# Patient Record
Sex: FEMALE | Race: WHITE | NOT HISPANIC OR LATINO | Employment: OTHER | URBAN - METROPOLITAN AREA
[De-identification: names, ages, dates, MRNs, and addresses within clinical notes are randomized per-mention and may not be internally consistent; named-entity substitution may affect disease eponyms.]

---

## 2017-06-05 ENCOUNTER — OFFICE VISIT (OUTPATIENT)
Dept: DERMATOLOGY | Facility: CLINIC | Age: 82
End: 2017-06-05
Payer: MEDICARE

## 2017-06-05 VITALS — OXYGEN SATURATION: 96 % | HEART RATE: 82 BPM | SYSTOLIC BLOOD PRESSURE: 141 MMHG | DIASTOLIC BLOOD PRESSURE: 70 MMHG

## 2017-06-05 DIAGNOSIS — Z85.828 HISTORY OF SKIN CANCER: ICD-10-CM

## 2017-06-05 DIAGNOSIS — L81.4 LENTIGO: Primary | ICD-10-CM

## 2017-06-05 DIAGNOSIS — L82.1 SK (SEBORRHEIC KERATOSIS): ICD-10-CM

## 2017-06-05 DIAGNOSIS — D18.00 ANGIOMA: ICD-10-CM

## 2017-06-05 DIAGNOSIS — Z85.820 HISTORY OF MELANOMA: ICD-10-CM

## 2017-06-05 PROCEDURE — 99213 OFFICE O/P EST LOW 20 MIN: CPT | Performed by: DERMATOLOGY

## 2017-06-05 NOTE — MR AVS SNAPSHOT
"              After Visit Summary   6/5/2017    Gabriela Agrawal    MRN: [id number]           Patient Information     Date Of Birth          8/8/1933        Visit Information        Provider Department      6/5/2017 9:00 AM Wade Padgett MD [facility]        Today's Diagnoses     Lentigo    -  1    History of skin cancer        History of melanoma        Angioma        SK (seborrheic keratosis)           Follow-ups after your visit        Your next 10 appointments already scheduled     Dec 05, 2017 11:45 AM CST   Return Visit with Wade Padgett MD   [facility] ([facility])    [address] [facility] [address]   [phone]              Who to contact     If you have questions or need follow up information about today's clinic visit or your schedule please contact [facility] directly at [phone].  Normal or non-critical lab and imaging results will be communicated to you by MyChart, letter or phone within 4 business days after the clinic has received the results. If you do not hear from us within 7 days, please contact the clinic through MyChart or phone. If you have a critical or abnormal lab result, we will notify you by phone as soon as possible.  Submit refill requests through [facility] or call your pharmacy and they will forward the refill request to us. Please allow 3 business days for your refill to be completed.          Additional Information About Your Visit        MyChart Information     [employer] lets you send messages to your doctor, view your test results, renew your prescriptions, schedule appointments and more. To sign up, go to [email or web address] . Click on \"Log in\" on the left side of the screen, which will take you to the Welcome page. Then click on \"Sign up Now\" on the right side of the page.     You will be asked to enter the access code listed below, as well as some personal information. Please follow " the directions to create your username and password.     Your access code is: [email or web address]  Expires: 9/3/2017  9:55 AM     Your access code will  in 90 days. If you need help or a new code, please call your [facility] clinic or [phone].        Care EveryWhere ID     This is your Care EveryWhere ID. This could be used by other organizations to access your [facility] medical records  [id number]        Your Vitals Were     Pulse Pulse Oximetry                82 96%           Blood Pressure from Last 3 Encounters:   17 141/70   16 146/73   11/15/16 136/76    Weight from Last 3 Encounters:   11/15/16 76.7 kg (169 lb)   12/11/15 73.5 kg (162 lb)   10/06/15 74.5 kg (164 lb 3.2 oz)              Today, you had the following     No orders found for display       Primary Care Provider Office Phone # Fax #    Coco Sampson Roberto,  [phone]       [facility] [address] [place] [address]        Thank you!     Thank you for choosing [facility]  for your care. Our goal is always to provide you with excellent care. Hearing back from our patients is one way we can continue to improve our services. Please take a few minutes to complete the written survey that you may receive in the mail after your visit with us. Thank you!             Your Updated Medication List - Protect others around you: Learn how to safely use, store and throw away your medicines at www.disposemymeds.org.          This list is accurate as of: 17  9:55 AM.  Always use your most recent med list.                   Brand Name Dispense Instructions for use    aspirin 81 MG tablet      Take 1 tablet by mouth daily.       CALCIUM + D PO      Take 1 tablet by mouth daily.       cholecalciferol 1000 UNIT tablet    vitamin D    100 tablet    Take 1 tablet by mouth daily.       fish oil-omega-3 fatty acids 1000 MG capsule     180 capsule    Take 1 g by mouth 2 times daily.       fluorouracil 5 %  cream    EFUDEX    40 g    Apply topically 2 times daily To face for two weeks       losartan 100 MG tablet    COZAAR    90 tablet    Take 1 tablet (100 mg) by mouth daily       lovastatin 20 MG tablet    MEVACOR    90 tablet    Take 1 tablet (20 mg) by mouth At Bedtime       MAG-200 PO      Take 1 tablet by mouth daily.       Multi-vitamin Tabs tablet   Generic drug:  multivitamin, therapeutic with minerals      1 TABLET DAILY       VITAMIN C PO      1 TABLET DAILY

## 2017-06-05 NOTE — PROGRESS NOTES
Gabriela Agrawal is a 83 year old year old female patient here today for f/u hx of non-melanoma skin cancer and melanoma.  Today she notes itching scaly papule[ots on left cheek.   .  Patient states this has been present for a while.  Patient reports the following symptoms:  itching.  Patient reports the following previous treatments none.  Patient reports the following modifying factors none.  Associated symptoms: none.  Patient has no other skin complaints today.  Remainder of the HPI, Meds, PMH, Allergies, FH, and SH was reviewed in chart.    Pertinent Hx:   Melanoma and basal cell carcinoma   Past Medical History:   Diagnosis Date     Basal cell carcinoma      Bronchiolitis obliterans (H)      Diffuse cystic mastopathy     Bresat Fibrocystic Disease     Disorder of bone and cartilage, unspecified      Malignant melanoma (H)      Nonspecific (abnormal) findings on radiological and other examination of lung field      Other abnormal blood chemistry      Other specified alveolar and parietoalveolar pneumonopathies      Squamous cell carcinoma (H)      Syncope and collapse     2007 holter neg       Past Surgical History:   Procedure Laterality Date     APPENDECTOMY OPEN       BREAST SURGERY      biopsy     COLONOSCOPY  2/8/2013    Procedure: COLONOSCOPY;  Colonoscopy  ;  Surgeon: Norah Whaley MD;  Location: [facility]     EYE SURGERY      keratoplasty     HERNIA REPAIR      left inginal     HYSTERECTOMY      including ovaries     KNEE SURGERY      2010, 2009     LUNG SURGERY      biopsy     melanoma  resection          Family History   Problem Relation Age of Onset     CANCER Other      Family History Negative Other        Social History     Social History     Marital status:      Spouse name: N/A     Number of children: N/A     Years of education: N/A     Occupational History      Retired     Social History Main Topics     Smoking status: Former Smoker     Packs/day: 1.50     Years: 22.50      Quit date: 1/1/1982     Smokeless tobacco: Never Used     Alcohol use Yes      Comment: 4-6 a week     Drug use: No     Sexual activity: Not on file     Other Topics Concern     Not on file     Social History Narrative       Outpatient Encounter Prescriptions as of 6/5/2017   Medication Sig Dispense Refill     lovastatin (MEVACOR) 20 MG tablet Take 1 tablet (20 mg) by mouth At Bedtime 90 tablet 3     losartan (COZAAR) 100 MG tablet Take 1 tablet (100 mg) by mouth daily 90 tablet 3     fluorouracil (EFUDEX) 5 % cream Apply topically 2 times daily To face for two weeks 40 g 0     Calcium Carbonate-Vitamin D (CALCIUM + D PO) Take 1 tablet by mouth daily.       Magnesium Oxide, [id number], (MAG-200 PO) Take 1 tablet by mouth daily.       fish oil-omega-3 fatty acids 1000 MG capsule Take 1 g by mouth 2 times daily. 180 capsule 12     cholecalciferol (VITAMIN D) 1000 UNIT tablet Take 1 tablet by mouth daily. 100 tablet 12     aspirin 81 MG tablet Take 1 tablet by mouth daily.  3     MULTI-VITAMIN OR TABS 1 TABLET DAILY       VITAMIN C OR 1 TABLET DAILY       No facility-administered encounter medications on file as of 6/5/2017.              Review Of Systems  Skin: As above  Eyes: negative  Ears/Nose/Throat: negative  Respiratory: No shortness of breath, dyspnea on exertion, cough, or hemoptysis  Cardiovascular: negative  Gastrointestinal: negative  Genitourinary: negative  Musculoskeletal: negative  Neurologic: negative  Psychiatric: negative  Hematologic/Lymphatic/Immunologic: negative  Endocrine: negative      O:   NAD, WDWN, Alert & Oriented, Mood & Affect wnl, Vitals stable   Here today alone   /70  Pulse 82  SpO2 96%   General appearance normal   Vitals stable   Alert, oriented and in no acute distress      Following lymph nodes palpated: Occipital, Cervical, Supraclavicular no lad   Stuck on papules and brown macules on trunk and ext    L cheek stuck on brown scaly papule    Red papules on trunk         The  remainder of the full exam was unremarkable; the following areas were examined:  conjunctiva/lids, oral mucosa, neck, peripheral vascular system, abdomen, lymph nodes, digits/nails, eccrine and apocrine glands, scalp/hair, face, neck, chest, abdomen, buttocks, back, RUE, LUE, RLE, LLE       Eyes: Conjunctivae/lids:Normal     ENT: Lips, buccal mucosa, tongue: normal    MSK:Normal    Cardiovascular: peripheral edema none    Pulm: Breathing Normal    Lymph Nodes: No Head and Neck Lymphadenopathy     Neuro/Psych: Orientation:Normal; Mood/Affect:Normal      A/P:  1. Seborrheic keratosis, lentigo, hx of non-melanoma skin cancer, hx of melanoma, angioma  MELANOMA DISCUSSED WITH PATIENT:  I discussed the specifics of tumor, prognosis, metachronous melanoma, self exam, and genetics with the patient. I explained the need for monthly skin exams including and taught the patient how to do this. Patient was asked about new or changing moles and a full skin exam was performed.   BENIGN LESIONS DISCUSSED WITH PATIENT:  I discussed the specifics of tumor, prognosis, and genetics of benign lesions.  I explained that treatment of these lesions would be purely cosmetic and not medically neccessary.  I discussed with patient different removal options including excision, cautery and /or laser.      Nature and genetics of benign skin lesions dicussed with patient.  Signs and Symptoms of skin cancer discussed with patient.  ABCDEs of melanoma reviewed with patient.  Patient encouraged to perform monthly skin exams.  UV precautions reviewed with patient.  Skin care regimen reviewed with patient: Eliminate harsh soaps, i.e. Dial, zest, irsih spring; Mild soaps such as Cetaphil or Dove sensitive skin, avoid hot or cold showers, aggressive use of emollients including vanicream, cetaphil or cerave discussed with patient.    Risks of non-melanoma skin cancer discussed with patient   Return to clinic [address] [place] [address]

## 2017-06-05 NOTE — NURSING NOTE
Chief Complaint   Patient presents with     Derm Problem     skin check       Vitals:    06/05/17 0904   BP: 141/70   Pulse: 82   SpO2: 96%     Wt Readings from Last 1 Encounters:   11/15/16 76.7 kg (169 lb)       Kristina Mejia LPN.................6/5/2017

## 2017-06-25 ENCOUNTER — HOSPITAL ENCOUNTER (EMERGENCY)
Facility: CLINIC | Age: 82
Discharge: HOME | End: 2017-06-25
Attending: PHYSICIAN ASSISTANT | Admitting: PHYSICIAN ASSISTANT
Payer: MEDICARE

## 2017-06-25 VITALS
SYSTOLIC BLOOD PRESSURE: 164 MMHG | RESPIRATION RATE: 20 BRPM | OXYGEN SATURATION: 94 % | TEMPERATURE: 98.1 F | DIASTOLIC BLOOD PRESSURE: 78 MMHG

## 2017-06-25 DIAGNOSIS — J98.01 ACUTE BRONCHOSPASM: ICD-10-CM

## 2017-06-25 DIAGNOSIS — J20.9 ACUTE BRONCHITIS, UNSPECIFIED ORGANISM: ICD-10-CM

## 2017-06-25 PROCEDURE — 25000125 ZZHC RX 250: Performed by: PHYSICIAN ASSISTANT

## 2017-06-25 PROCEDURE — 94640 AIRWAY INHALATION TREATMENT: CPT

## 2017-06-25 PROCEDURE — 99214 OFFICE O/P EST MOD 30 MIN: CPT | Performed by: PHYSICIAN ASSISTANT

## 2017-06-25 PROCEDURE — 99213 OFFICE O/P EST LOW 20 MIN: CPT | Mod: 25

## 2017-06-25 RX ORDER — IPRATROPIUM BROMIDE AND ALBUTEROL SULFATE 2.5; .5 MG/3ML; MG/3ML
3 SOLUTION RESPIRATORY (INHALATION) ONCE
Status: COMPLETED | OUTPATIENT
Start: 2017-06-25 | End: 2017-06-25

## 2017-06-25 RX ORDER — PREDNISONE 20 MG/1
TABLET ORAL
Qty: 10 TABLET | Refills: 0 | Status: SHIPPED | OUTPATIENT
Start: 2017-06-25 | End: 2017-11-21

## 2017-06-25 RX ADMIN — IPRATROPIUM BROMIDE AND ALBUTEROL SULFATE 3 ML: .5; 3 SOLUTION RESPIRATORY (INHALATION) at 12:31

## 2017-06-25 NOTE — ED AVS SNAPSHOT
[facility]    [address] [facility] [address]    Phone:  [phone]    Fax:  [phone]                                       Gabriela Agrawal   MRN: [id number]    Department:  [facility]   Date of Visit:  6/25/2017           After Visit Summary Signature Page     I have received my discharge instructions, and my questions have been answered. I have discussed any challenges I see with this plan with the nurse or doctor.    ..........................................................................................................................................  Patient/Patient Representative Signature      ..........................................................................................................................................  Patient Representative Print Name and Relationship to Patient    ..................................................               ................................................  Date                                            Time    ..........................................................................................................................................  Reviewed by Signature/Title    ...................................................              ..............................................  Date                                                            Time

## 2017-06-25 NOTE — DISCHARGE INSTRUCTIONS

## 2017-06-25 NOTE — ED AVS SNAPSHOT
[facility]    [address] [facility] [address]    Phone:  [phone]    Fax:  [phone]                                       Gabriela Agrawal   MRN: [id number]    Department:  [facility]   Date of Visit:  6/25/2017           Patient Information     Date Of Birth          8/8/1933        Your diagnoses for this visit were:     Acute bronchospasm     Acute bronchitis, unspecified organism        You were seen by Gustavo Hudson PA-C.        Discharge Instructions         What Is Acute Bronchitis?  Acute bronchitis is when the airways in your lungs (bronchial tubes) become red and swollen (inflamed). It is usually caused by a viral infection. But it can also occur because of a bacteria or allergen. Symptoms include a cough that produces yellow or greenish mucus and can last for days or sometimes weeks.  Inside healthy lungs    Air travels in and out of the lungs through the airways. The linings of these airways produce sticky mucus. This mucus traps particles that enter the lungs. Tiny structures called cilia then sweep the particles out of the airways.     Healthy airway: Airways are normally open. Air moves in and out easily.      Healthy cilia: Tiny, hairlike cilia sweep mucus and particles up and out of the airways.   Lungs with bronchitis  Bronchitis often occurs with a cold or the flu virus. The airways become inflamed (red and swollen). There is a deep hacking cough from the extra mucus. Other symptoms may include:    Wheezing    Chest discomfort    Shortness of breath    Mild fever  A second infection, this time due to bacteria, may then occur. And airways irritated by allergens or smoke are more likely to get infected.        Inflamed airway: Inflammation and extra mucus narrow the airway, causing shortness of breath.      Impaired cilia: Extra mucus impairs cilia, causing congestion and wheezing. Smoking makes the problem worse.   Making a  diagnosis  A physical exam, health history, and certain tests help your healthcare provider make the diagnosis.  Health history  Your healthcare provider will ask you about your symptoms.  The exam  Your provider listens to your chest for signs of congestion. He or she may also check your ears, nose, and throat.  Possible tests    A sputum test for bacteria. This requires a sample of mucus from your lungs.    A nasal or throat swab. This tests to see if you have a bacterial infection.    A chest X-ray. This is done if your healthcare provider thinks you have pneumonia.    Tests to check for an underlying condition. Other tests may be done to check for things such as allergies, asthma, or COPD (chronic obstructive pulmonary disease). You may need to see a specialist for more lung function testing.  Treating a cough  The main treatment for bronchitis is easing symptoms. Avoiding smoke, allergens, and other things that trigger coughing can often help. If the infection is bacterial, you may be given antibiotics. During the illness, it's important to get plenty of sleep. To ease symptoms:    Don t smoke. Also avoid secondhand smoke.    Use a humidifier. Or try breathing in steam from a hot shower. This may help loosen mucus.    Drink a lot of water and juice. They can soothe the throat and may help thin mucus.    Sit up or use extra pillows when in bed. This helps to lessen coughing and congestion.    Ask your provider about using medicine. Ask about using cough medicine, pain and fever medicine, or a decongestant.  Antibiotics  Most cases of bronchitis are caused by cold or flu viruses. They don t need antibiotics to treat them, even if your mucus is thick and green or yellow. Antibiotics don t treat viral illness and antibiotics have not been shown to have any benefit in cases of acute bronchitis. Taking antibiotics when they are not needed increases your risk of getting an infection later that is antibiotic-resistant.  Antibiotics can also cause severe cases of diarrhea that require other antibiotics to treat.  It is important that you accept your healthcare provider's opinion to not use antibiotics. Your provider will prescribe antibiotics if the infection is caused by bacteria. If they are prescribed:    Take all of the medicine. Take the medicine until it is used up, even if symptoms have improved. If you don t, the bronchitis may come back.    Take the medicines as directed. For instance, some medicines should be taken with food.    Ask about side effects. Ask your provider or pharmacist what side effects are common, and what to do about them.  Follow-up care  You should see your provider again in 2 to 3 weeks. By this time, symptoms should have improved. An infection that lasts longer may mean you have a more serious problem.  Prevention    Avoid tobacco smoke. If you smoke, quit. Stay away from smoky places. Ask friends and family not to smoke around you, or in your home or car.    Get checked for allergies.    Ask your provider about getting a yearly flu shot. Also ask about pneumococcal or pneumonia shots.    Wash your hands often. This helps reduce the chance of picking up viruses that cause colds and flu.  Call your healthcare provider if:    Symptoms worsen, or you have new symptoms    Breathing problems worsen or  become severe    Symptoms don t get better within a week, or within 3 days of taking antibiotics   Date Last Reviewed: 2/1/2017 2000-2017 [employer]. [address] [place] [address], [place] [address]. All rights reserved. This information is not intended as a substitute for professional medical care. Always follow your healthcare professional's instructions.          Future Appointments        Provider Department Dept Phone Center    12/5/2017 11:45 AM Wade Padgett MD [facility] [phone] [facility]      24 Hour Appointment Hotline       To make an appointment at any [facility]  clinic, call [phone] ([phone]). If you don't have a family doctor or clinic, we will help you find one. [facility] clinics are conveniently located to serve the needs of you and your family.             Review of your medicines      START taking        Dose / Directions Last dose taken    predniSONE 20 MG tablet   Commonly known as:  DELTASONE   Quantity:  10 tablet        Take two tablets (= 40mg) each day for 5 (five) days   Refills:  0          Our records show that you are taking the medicines listed below. If these are incorrect, please call your family doctor or clinic.        Dose / Directions Last dose taken    aspirin 81 MG tablet   Dose:  1 tablet        Take 1 tablet by mouth daily.   Refills:  3        CALCIUM + D PO   Dose:  1 tablet        Take 1 tablet by mouth daily.   Refills:  0        cholecalciferol 1000 UNIT tablet   Commonly known as:  vitamin D   Dose:  1 tablet   Quantity:  100 tablet        Take 1 tablet by mouth daily.   Refills:  12        fish oil-omega-3 fatty acids 1000 MG capsule   Dose:  1 g   Quantity:  180 capsule        Take 1 g by mouth 2 times daily.   Refills:  12        fluorouracil 5 % cream   Commonly known as:  EFUDEX   Quantity:  40 g        Apply topically 2 times daily To face for two weeks   Refills:  0        losartan 100 MG tablet   Commonly known as:  COZAAR   Dose:  100 mg   Quantity:  90 tablet        Take 1 tablet (100 mg) by mouth daily   Refills:  3        lovastatin 20 MG tablet   Commonly known as:  MEVACOR   Dose:  20 mg   Quantity:  90 tablet        Take 1 tablet (20 mg) by mouth At Bedtime   Refills:  3        MAG-200 PO   Dose:  1 tablet        Take 1 tablet by mouth daily.   Refills:  0        Multi-vitamin Tabs tablet   Generic drug:  multivitamin, therapeutic with minerals        1 TABLET DAILY   Refills:  0        VITAMIN C PO        1 TABLET DAILY   Refills:  0                Prescriptions were sent or printed at these locations (1  "Prescription)                   [facility] - [place], [place] - [address]   [address] [facility] [address]    Telephone:  [phone]   Fax:  [phone]   Hours:                  E-Prescribed (1 of 1)         predniSONE (DELTASONE) 20 MG tablet                Orders Needing Specimen Collection     None      Pending Results     No orders found from 2017 to 2017.            Pending Culture Results     No orders found from 2017 to 2017.            Pending Results Instructions     If you had any lab results that were not finalized at the time of your Discharge, you can call the ED Lab Result RN at [phone]. You will be contacted by this team for any positive Lab results or changes in treatment. The nurses are available 7 days a week from 10A to 6:30P.  You can leave a message 24 hours per day and they will return your call.        Test Results From Your Hospital Stay               Thank you for choosing [facility]       Thank you for choosing [facility] for your care. Our goal is always to provide you with excellent care. Hearing back from our patients is one way we can continue to improve our services. Please take a few minutes to complete the written survey that you may receive in the mail after you visit with us. Thank you!        [employer] Information     [email or web address] lets you send messages to your doctor, view your test results, renew your prescriptions, schedule appointments and more. To sign up, go to [email or web address] . Click on \"Log in\" on the left side of the screen, which will take you to the Welcome page. Then click on \"Sign up Now\" on the right side of the page.     You will be asked to enter the access code listed below, as well as some personal information. Please follow the directions to create your username and password.     Your access code is: [email or web address]  Expires: 9/3/2017  9:55 AM     Your access code will  in 90 days. If you need help or a new " code, please call your [facility] clinic or [phone].        Care EveryWhere ID     This is your Care EveryWhere ID. This could be used by other organizations to access your [facility] medical records  [id number]        Equal Access to Services     YVONNE STRICKLAND : Miguel Godinez, wamadisonda luqadaha, qaybta kaalmada jessi, willi olivera. So [facility] [phone].    ATENCIÓN: Si habla español, tiene a chavez disposición servicios gratuitos de asistencia lingüística. Llame al [phone].    We comply with applicable federal civil rights laws and [place] laws. We do not discriminate on the basis of race, color, national origin, age, disability sex, sexual orientation or gender identity.            After Visit Summary       This is your record. Keep this with you and show to your community pharmacist(s) and doctor(s) at your next visit.

## 2017-06-25 NOTE — ED PROVIDER NOTES
Chief Complaint: cough    HPI: Gabriela Agrawal is an 83 year old female who presents with a cough.   It began  2 day(s) ago and has unchanged.  Cough is slightly productive There is not SOB, there is wheezing  There is not edema.  There is not hemoptysis.   Chest pain is not present  Associated symptoms: congestion .   Recent travel?      ROS:  10 point review of systems was completed and is negative unless noted in the HPI above.     Respiratory History  pneumonia     Problem history  Patient Active Problem List   Diagnosis     CARDIOVASCULAR SCREENING; LDL GOAL LESS THAN 160     Melanoma (H)     Basal cell carcinoma, lip     Squamous cell carcinoma in situ     History of melanoma     Hyperlipidemia LDL goal <130     History of skin cancer     Seborrheic keratosis     Lentigo     Angioma     Dermal nevus     AK (actinic keratosis)     Advanced directives, counseling/discussion     Essential hypertension, benign     Osteopenia     Serum calcium elevated     Grief reaction        Allergies  Allergies   Allergen Reactions     Nkda [No Known Drug Allergies]         Smoking History  History   Smoking Status     Former Smoker     Packs/day: 1.50     Years: 22.50     Quit date: 1/1/1982   Smokeless Tobacco     Never Used        Current Meds    Current Facility-Administered Medications:      ipratropium - albuterol 0.5 mg/2.5 mg/3 mL (DUONEB) neb solution 3 mL, 3 mL, Nebulization, Once, Gustavo Hudson PA-C    Current Outpatient Prescriptions:      predniSONE (DELTASONE) 20 MG tablet, Take two tablets (= 40mg) each day for 5 (five) days, Disp: 10 tablet, Rfl: 0     lovastatin (MEVACOR) 20 MG tablet, Take 1 tablet (20 mg) by mouth At Bedtime, Disp: 90 tablet, Rfl: 3     losartan (COZAAR) 100 MG tablet, Take 1 tablet (100 mg) by mouth daily, Disp: 90 tablet, Rfl: 3     fluorouracil (EFUDEX) 5 % cream, Apply topically 2 times daily To face for two weeks, Disp: 40 g, Rfl: 0     Calcium Carbonate-Vitamin D (CALCIUM + D PO),  Take 1 tablet by mouth daily., Disp: , Rfl:      Magnesium Oxide, [id number], (MAG-200 PO), Take 1 tablet by mouth daily., Disp: , Rfl:      fish oil-omega-3 fatty acids 1000 MG capsule, Take 1 g by mouth 2 times daily., Disp: 180 capsule, Rfl: 12     cholecalciferol (VITAMIN D) 1000 UNIT tablet, Take 1 tablet by mouth daily., Disp: 100 tablet, Rfl: 12     aspirin 81 MG tablet, Take 1 tablet by mouth daily., Disp: , Rfl: 3     MULTI-VITAMIN OR TABS, 1 TABLET DAILY, Disp: , Rfl:      VITAMIN C OR, 1 TABLET DAILY, Disp: , Rfl:         OBJECTIVE     Vital signs reviewed by Gustavo Hudson  /78  Temp 98.1  F (36.7  C) (Oral)  Resp 20  SpO2 94%     PEFR:  General appearance: healthy, alert and no distress  Ears: R TM - normal: no effusions, no erythema, and normal landmarks, L TM - normal: no effusions, no erythema, and normal landmarks  Eyes: R normal, L normal  Nose: inflamed  Oropharynx: normal  Neck: supple and no adenopathy  Lungs: expiratory wheezes  Heart: S1, S2 normal, no murmur, click, rub or gallop, regular rate and rhythm         ASSESSMENT    1. Acute bronchospasm    2. Acute bronchitis, unspecified organism        PLAN  1. Rest.  2. Push fluids, vaporizer, elevation of head of bed.  3. Over the counter cough suppressant- PRN- as discussed.   4. If symptoms worsen, recheck immediately otherwise follow up PRN.  5. Patient given duoneb in clinic today.  She verbalized moderate relief of symptoms  6. Prednisone for the next 5 days.           Gustavo Hudson  6/25/2017, 12:18 PM       Gustavo Hudson PA-C  06/25/17 [id number]

## 2017-08-22 ENCOUNTER — HOSPITAL ENCOUNTER (OUTPATIENT)
Dept: MAMMOGRAPHY | Facility: CLINIC | Age: 82
Discharge: HOME | End: 2017-08-22
Attending: INTERNAL MEDICINE | Admitting: INTERNAL MEDICINE
Payer: MEDICARE

## 2017-08-22 DIAGNOSIS — Z12.31 VISIT FOR SCREENING MAMMOGRAM: ICD-10-CM

## 2017-08-22 PROCEDURE — G0202 SCR MAMMO BI INCL CAD: HCPCS

## 2017-11-21 ENCOUNTER — OFFICE VISIT (OUTPATIENT)
Dept: FAMILY MEDICINE | Facility: CLINIC | Age: 82
End: 2017-11-21
Payer: MEDICARE

## 2017-11-21 VITALS
TEMPERATURE: 97 F | DIASTOLIC BLOOD PRESSURE: 72 MMHG | HEIGHT: 67 IN | OXYGEN SATURATION: 94 % | BODY MASS INDEX: 25.93 KG/M2 | HEART RATE: 86 BPM | WEIGHT: 165.2 LBS | SYSTOLIC BLOOD PRESSURE: 146 MMHG

## 2017-11-21 DIAGNOSIS — M85.89 OSTEOPENIA OF MULTIPLE SITES: ICD-10-CM

## 2017-11-21 DIAGNOSIS — Z23 NEED FOR PROPHYLACTIC VACCINATION AGAINST STREPTOCOCCUS PNEUMONIAE (PNEUMOCOCCUS): ICD-10-CM

## 2017-11-21 DIAGNOSIS — Z00.00 ENCOUNTER FOR GENERAL ADULT MEDICAL EXAMINATION WITHOUT ABNORMAL FINDINGS: Primary | ICD-10-CM

## 2017-11-21 DIAGNOSIS — I10 ESSENTIAL HYPERTENSION, BENIGN: ICD-10-CM

## 2017-11-21 DIAGNOSIS — E78.5 HYPERLIPIDEMIA LDL GOAL <130: ICD-10-CM

## 2017-11-21 DIAGNOSIS — Z23 NEED FOR PROPHYLACTIC VACCINATION AND INOCULATION AGAINST INFLUENZA: ICD-10-CM

## 2017-11-21 PROCEDURE — 90732 PPSV23 VACC 2 YRS+ SUBQ/IM: CPT | Performed by: INTERNAL MEDICINE

## 2017-11-21 PROCEDURE — 90662 IIV NO PRSV INCREASED AG IM: CPT | Performed by: INTERNAL MEDICINE

## 2017-11-21 PROCEDURE — G0009 ADMIN PNEUMOCOCCAL VACCINE: HCPCS | Performed by: INTERNAL MEDICINE

## 2017-11-21 PROCEDURE — G0008 ADMIN INFLUENZA VIRUS VAC: HCPCS | Performed by: INTERNAL MEDICINE

## 2017-11-21 PROCEDURE — G0439 PPPS, SUBSEQ VISIT: HCPCS | Performed by: INTERNAL MEDICINE

## 2017-11-21 PROCEDURE — 99212 OFFICE O/P EST SF 10 MIN: CPT | Mod: 25 | Performed by: INTERNAL MEDICINE

## 2017-11-21 RX ORDER — LOVASTATIN 20 MG
20 TABLET ORAL AT BEDTIME
Qty: 90 TABLET | Refills: 3 | Status: SHIPPED | OUTPATIENT
Start: 2017-11-21 | End: 2018-12-19

## 2017-11-21 RX ORDER — LOSARTAN POTASSIUM 100 MG/1
100 TABLET ORAL DAILY
Qty: 90 TABLET | Refills: 3 | Status: SHIPPED | OUTPATIENT
Start: 2017-11-21 | End: 2018-12-11

## 2017-11-21 NOTE — MR AVS SNAPSHOT
After Visit Summary   11/21/2017    Gabriela Agrawal    MRN: [id number]           Patient Information     Date Of Birth          8/8/1933        Visit Information        Provider Department      11/21/2017 9:40 AM Coco Mejia,  [facility]        Today's Diagnoses     Encounter for general adult medical examination without abnormal findings    -  1    Hyperlipidemia LDL goal <130        Essential hypertension, benign        Need for prophylactic vaccination against Streptococcus pneumoniae (pneumococcus)        Need for prophylactic vaccination and inoculation against influenza        Osteopenia of multiple sites          Care Instructions      Preventive Health Recommendations    Female Ages 65 +    Yearly exam:     See your health care provider every year in order to  o Review health changes.   o Discuss preventive care.    o Review your medicines if your doctor has prescribed any.      You no longer need a yearly Pap test unless you've had an abnormal Pap test in the past 10 years. If you have vaginal symptoms, such as bleeding or discharge, be sure to talk with your provider about a Pap test.      Every 1 to 2 years, have a mammogram.  If you are over 69, talk with your health care provider about whether or not you want to continue having screening mammograms.      Every 10 years, have a colonoscopy. Or, have a yearly FIT test (stool test). These exams will check for colon cancer.       Have a cholesterol test every 5 years, or more often if your doctor advises it.       Have a diabetes test (fasting glucose) every three years. If you are at risk for diabetes, you should have this test more often.       At age 65, have a bone density scan (DEXA) to check for osteoporosis (brittle bone disease).    Shots:    Get a flu shot each year.    Get a tetanus shot every 10 years.    Talk to your doctor about your pneumonia vaccines. There are now two you should receive - Pneumovax (PPSV  23) and Prevnar (PCV 13).    Talk to your doctor about the shingles vaccine.    Talk to your doctor about the hepatitis B vaccine.    Nutrition:     Eat at least 5 servings of fruits and vegetables each day.      Eat whole-grain bread, whole-wheat pasta and brown rice instead of white grains and rice.      Talk to your provider about Calcium and Vitamin D.     Lifestyle    Exercise at least 150 minutes a week (30 minutes a day, 5 days a week). This will help you control your weight and prevent disease.      Limit alcohol to one drink per day.      No smoking.       Wear sunscreen to prevent skin cancer.       See your dentist twice a year for an exam and cleaning.      See your eye doctor every 1 to 2 years to screen for conditions such as glaucoma, macular degeneration and cataracts.        Thank you for choosing [facility].  You may be receiving a survey in the mail from Mildred Asher regarding your visit today.  Please take a few minutes to complete and return the survey to let us know how we are doing.      If you have questions or concerns, please contact us via [employer] or you can contact your care team at [phone].    Our Clinic hours are:  Monday 6:40 am  to 7:00 pm  Tuesday -Friday 6:40 am to 5:00 pm    The [place] outpatient lab hours are:  Monday - Friday 6:10 am to 4:45 pm  Saturdays 7:00 am to 11:00 am  Appointments are required, call [phone]      If you have clinical questions after hours or would like to schedule an appointment,  call the clinic at [phone].    1. Your blood pressure was elevated today.  Please come back to clinic in 2 weeks for (free) Nurse visit to recheck blood pressure.  Please make appointment at the  on your way out today.  2. Consider buying Omron brand arm BP monitor.  Ideal blood pressure is consistently less than 140.  Best way is take 2+ hours after you have taken your blood pressure medication, and not while drinking coffee, alcohol, using advil,  sudafed, etc.  These can raise your blood pressure.   Sit quietly for several min with feet flat on ground.  With arm at heart level, take blood pressure, then again in 1 min.  Average the 2 readings and record this.  Can come back to see RN for blood pressure check.  3. Recommend vitamin D is 2000 units        Preventing Osteoporosis: Meeting Your Calcium Needs    Your body needs calcium to build and repair bones. But it can't make calcium on its own. That's why it's important to eat calcium-rich foods. Some foods are naturally rich in calcium. Others have calcium added (fortified). It's best to get calcium from the foods you eat. But if you can't get enough, you may want to take calcium supplements. To meet your daily calcium needs, try the foods listed below.  Dairy Fish & beans Other sources      Source   Calcium (mg) per serving   Source   Calcium (mg) per serving   Source   Calcium (mg) per serving      Low-fat yogurt, plain   415 mg/8 oz.   Sardines, Atlantic, canned, with bones   351 mg/3 oz.   Oatmeal, instant, fortified   215 mg/1 cup   Nonfat milk   302 mg/1 cup   [place], sockeye, canned, with bones   239 mg/3 oz.   Tofu made with calcium sulfate   204 mg/3 oz.   Low-fat milk   297 mg/1 cup   Soybeans, fresh, boiled   131 mg/1/2 cup   Collards   179 mg/1/2 cup   Swiss cheese   272 mg/1 oz.   White beans, cooked   81 mg/1/2 cup   English muffin, whole wheat   175 mg/1 muffin   Cheddar cheese   205 mg/1 oz.   Navy beans, cooked   79 mg/1/2 cup   Kale   90 mg/1/2 cup   Ice cream strawberry   79 mg/1/2 cup           Orange, navel   56 mg/1 medium   Note: Calcium levels may vary depending on brand and size.  Daily calcium needs  14-18 years old: 1,300 mg  19-30 years old: 1,000 mg  31-50 years old: 1,000 mg  51-70 years old, women: 1,200 mg  51-70 years old, men: 1,000 mg  Pregnant or nursin-28 years old: 1,300 mg, 19-50 years old: 1,000 mg  Older than 70 (women and men): 1,200 mg   Date Last Reviewed:  10/17/2015    2621-8249 [employer]. [address] [place] [address]147. All rights reserved. This information is not intended as a substitute for professional medical care. Always follow your healthcare professional's instructions.        Living with Osteoporosis: Regular Exercise  If you have osteoporosis, exercise is vital for your health. It can prevent bone fractures and spine changes. It will slow bone loss. Exercise will strengthen your body. It can also be fun. A variety of exercises is best. See below for exercises that can help you. Before you start, though, talk to your healthcare provider to be sure these exercises are right for you.    Resistance exercises. These build muscle strength and maintain bone mass. They also make you less prone to injury. Exercises include lifting small weights, doing push-ups and sit-ups, using elastic exercise bands, and using weight machines.  Weight-bearing activities. These help your whole body. They also help you maintain bone mass. Activities include walking, dancing, and housework.  Nonweight-bearing exercises. These help prevent back strain and pain. They do this by building the trunk and leg muscles. Exercises that help with flexibility can prevent falls. Examples include swimming, water exercise, and stretching.  Staying safe  Here are tips to stay safe:     Always check with your healthcare provider before starting any new exercise program.    Use weights only as instructed.    Stop any exercise that causes pain.   Date Last Reviewed: 10/17/2015    2598-3860 [employer]. [address] [place] [address]261. All rights reserved. This information is not intended as a substitute for professional medical care. Always follow your healthcare professional's instructions.                Follow-ups after your visit        Your next 10 appointments already scheduled     Dec 05, 2017 11:45 AM CST   [facility] Wade Chen  "[facility] [place]   [facility]    [address]  [facility] [address] [phone]              Who to contact     If you have questions or need follow up information about today's clinic visit or your schedule please contact [facility] directly at [phone].  Normal or non-critical lab and imaging results will be communicated to you by MyChart, letter or phone within 4 business days after the clinic has received the results. If you do not hear from us within 7 days, please contact the clinic through MyChart or phone. If you have a critical or abnormal lab result, we will notify you by phone as soon as possible.  Submit refill requests through [employer] or call your pharmacy and they will forward the refill request to us. Please allow 3 business days for your refill to be completed.          Additional Information About Your Visit        [employer] Information     [employer] lets you send messages to your doctor, view your test results, renew your prescriptions, schedule appointments and more. To sign up, go to [email or web address] . Click on \"Log in\" on the left side of the screen, which will take you to the Welcome page. Then click on \"Sign up Now\" on the right side of the page.     You will be asked to enter the access code listed below, as well as some personal information. Please follow the directions to create your username and password.     Your access code is: [email or web address]  Expires: 2018 10:23 AM     Your access code will  in 90 days. If you need help or a new code, please call your [facility] clinic or [phone].        Care EveryWhere ID     This is your Care EveryWhere ID. This could be used by other organizations to access your [facility] medical records  [id number]        Your Vitals Were     Pulse Temperature Height Pulse Oximetry BMI (Body Mass Index)       86 97  F (36.1  C) (Tympanic) 5' 6.5\" (1.689 m) 94% 26.26 kg/m2        " Blood Pressure from Last 3 Encounters:   11/21/17 156/64   06/25/17 164/78   06/05/17 141/70    Weight from Last 3 Encounters:   11/21/17 165 lb 3.2 oz (74.9 kg)   11/15/16 169 lb (76.7 kg)   12/11/15 162 lb (73.5 kg)              We Performed the Following     ADMIN INFLUENZA (For MEDICARE Patients ONLY) []     ADMIN MEDICARE: Pneumococcal Vaccine ()     Basic metabolic panel     FLU VACCINE, INCREASED ANTIGEN, PRESV FREE, AGE 65+ [91275]     Lipid Profile (Chol, Trig, HDL, LDL calc)     Pneumococcal vaccine 23 valent PPSV23  (Pneumovax) [62980]          Where to get your medicines      These medications were sent to [facility] [place], [place] - [address] [facility]  [address] [facility] [address]     Phone:  [phone]     losartan 100 MG tablet    lovastatin 20 MG tablet          Primary Care Provider Office Phone # Fax #    Coco Mejia  [phone] [address] [facility] [address]        Equal Access to Services     YVONNE STRICKLAND AH: Hadii aric griffin hadasho Soomaali, waaxda luqadaha, qaybta kaalmada adeegyada, willi olivera. So [facility] [phone].    ATENCIÓN: Si habla español, tiene a chavez disposición servicios gratuitos de asistencia lingüística. Jodyame al [phone].    We comply with applicable federal civil rights laws and [place] laws. We do not discriminate on the basis of race, color, national origin, age, disability, sex, sexual orientation, or gender identity.            Thank you!     Thank you for choosing [facility]  for your care. Our goal is always to provide you with excellent care. Hearing back from our patients is one way we can continue to improve our services. Please take a few minutes to complete the written survey that you may receive in the mail after your visit with us. Thank you!             Your Updated Medication List - Protect others around you: Learn how to  safely use, store and throw away your medicines at www.disposemymeds.org.          This list is accurate as of: 11/21/17 10:23 AM.  Always use your most recent med list.                   Brand Name Dispense Instructions for use Diagnosis    aspirin 81 MG tablet      Take 1 tablet by mouth daily.    UTI (urinary tract infection), Hypertension, CARDIOVASCULAR SCREENING; LDL GOAL LESS THAN 160       CALCIUM + D PO      Take 1 tablet by mouth daily.        cholecalciferol 1000 UNIT tablet    vitamin D3    100 tablet    Take 1 tablet by mouth daily.    UTI (urinary tract infection), Hypertension, CARDIOVASCULAR SCREENING; LDL GOAL LESS THAN 160       fish oil-omega-3 fatty acids 1000 MG capsule     180 capsule    Take 1 g by mouth 2 times daily.    UTI (urinary tract infection), Hypertension, CARDIOVASCULAR SCREENING; LDL GOAL LESS THAN 160       losartan 100 MG tablet    COZAAR    90 tablet    Take 1 tablet (100 mg) by mouth daily    Essential hypertension, benign       lovastatin 20 MG tablet    MEVACOR    90 tablet    Take 1 tablet (20 mg) by mouth At Bedtime    Hyperlipidemia LDL goal <130       MAG-200 PO      Take 1 tablet by mouth daily.    Microscopic hematuria       Multi-vitamin Tabs tablet   Generic drug:  multivitamin, therapeutic with minerals      1 TABLET DAILY        OVER-THE-COUNTER      Tumeric-Curcumen 1 capsule daily , unknown dose        VITAMIN B-12 PO      Take 1 tablet by mouth daily        VITAMIN C PO      1 TABLET DAILY

## 2017-11-21 NOTE — NURSING NOTE
"Chief Complaint   Patient presents with     Wellness Visit     wellness, renew meds, patient has only had black coffee this morning      Flu Shot     flu shot and pneumovax 23       Initial /64 (BP Location: Left arm, Patient Position: Chair, Cuff Size: Adult Large)  Pulse 86  Temp 97  F (36.1  C) (Tympanic)  Ht 5' 6.5\" (1.689 m)  Wt 165 lb 3.2 oz (74.9 kg)  SpO2 94%  BMI 26.26 kg/m2 Estimated body mass index is 26.26 kg/(m^2) as calculated from the following:    Height as of this encounter: 5' 6.5\" (1.689 m).    Weight as of this encounter: 165 lb 3.2 oz (74.9 kg).  Medication Reconciliation: complete  Screening Questionnaire for Adult Immunization    Are you sick today?   No   Do you have allergies to medications, food, a vaccine component or latex?   No   Have you ever had a serious reaction after receiving a vaccination?   No   Do you have a long-term health problem with heart disease, lung disease, asthma, kidney disease, metabolic disease (e.g. diabetes), anemia, or other blood disorder?   Yes   Do you have cancer, leukemia, HIV/AIDS, or any other immune system problem?   No   In the past 3 months, have you taken medications that affect  your immune system, such as prednisone, other steroids, or anticancer drugs; drugs for the treatment of rheumatoid arthritis, Crohn s disease, or psoriasis; or have you had radiation treatments?   No   Have you had a seizure, or a brain or other nervous system problem?   No   During the past year, have you received a transfusion of blood or blood     products, or been given immune (gamma) globulin or antiviral drug?   No   For women: Are you pregnant or is there a chance you could become        pregnant during the next month?   No   Have you received any vaccinations in the past 4 weeks?   No     Immunization questionnaire was positive for at least one answer.  Ok per guidelines for pneumovax and flu shot .         Patient instructed to remain in clinic for 15 " minutes afterwards, and to report any adverse reaction to me immediately.       Screening performed by Donovan Johnson on 11/21/2017 at 10:08 AM.

## 2017-11-21 NOTE — PATIENT INSTRUCTIONS
Preventive Health Recommendations    Female Ages 65 +    Yearly exam:     See your health care provider every year in order to  o Review health changes.   o Discuss preventive care.    o Review your medicines if your doctor has prescribed any.      You no longer need a yearly Pap test unless you've had an abnormal Pap test in the past 10 years. If you have vaginal symptoms, such as bleeding or discharge, be sure to talk with your provider about a Pap test.      Every 1 to 2 years, have a mammogram.  If you are over 69, talk with your health care provider about whether or not you want to continue having screening mammograms.      Every 10 years, have a colonoscopy. Or, have a yearly FIT test (stool test). These exams will check for colon cancer.       Have a cholesterol test every 5 years, or more often if your doctor advises it.       Have a diabetes test (fasting glucose) every three years. If you are at risk for diabetes, you should have this test more often.       At age 65, have a bone density scan (DEXA) to check for osteoporosis (brittle bone disease).    Shots:    Get a flu shot each year.    Get a tetanus shot every 10 years.    Talk to your doctor about your pneumonia vaccines. There are now two you should receive - Pneumovax (PPSV 23) and Prevnar (PCV 13).    Talk to your doctor about the shingles vaccine.    Talk to your doctor about the hepatitis B vaccine.    Nutrition:     Eat at least 5 servings of fruits and vegetables each day.      Eat whole-grain bread, whole-wheat pasta and brown rice instead of white grains and rice.      Talk to your provider about Calcium and Vitamin D.     Lifestyle    Exercise at least 150 minutes a week (30 minutes a day, 5 days a week). This will help you control your weight and prevent disease.      Limit alcohol to one drink per day.      No smoking.       Wear sunscreen to prevent skin cancer.       See your dentist twice a year for an exam and cleaning.      See your  eye doctor every 1 to 2 years to screen for conditions such as glaucoma, macular degeneration and cataracts.        Thank you for choosing [facility].  You may be receiving a survey in the mail from Mildred Asher regarding your visit today.  Please take a few minutes to complete and return the survey to let us know how we are doing.      If you have questions or concerns, please contact us via [facility] or you can contact your care team at [phone].    Our Clinic hours are:  Monday 6:40 am  to 7:00 pm  Tuesday -Friday 6:40 am to 5:00 pm    The [place] outpatient lab hours are:  Monday - Friday 6:10 am to 4:45 pm  Saturdays 7:00 am to 11:00 am  Appointments are required, call [phone]      If you have clinical questions after hours or would like to schedule an appointment,  call the clinic at [phone].    1. Your blood pressure was elevated today.  Please come back to clinic in 2 weeks for (free) Nurse visit to recheck blood pressure.  Please make appointment at the  on your way out today.  2. Consider buying Omron brand arm BP monitor.  Ideal blood pressure is consistently less than 140.  Best way is take 2+ hours after you have taken your blood pressure medication, and not while drinking coffee, alcohol, using advil, sudafed, etc.  These can raise your blood pressure.   Sit quietly for several min with feet flat on ground.  With arm at heart level, take blood pressure, then again in 1 min.  Average the 2 readings and record this.  Can come back to see RN for blood pressure check.  3. Recommend vitamin D is 2000 units        Preventing Osteoporosis: Meeting Your Calcium Needs    Your body needs calcium to build and repair bones. But it can't make calcium on its own. That's why it's important to eat calcium-rich foods. Some foods are naturally rich in calcium. Others have calcium added (fortified). It's best to get calcium from the foods you eat. But if you can't get enough, you may want to  take calcium supplements. To meet your daily calcium needs, try the foods listed below.  Dairy Fish & beans Other sources      Source   Calcium (mg) per serving   Source   Calcium (mg) per serving   Source   Calcium (mg) per serving      Low-fat yogurt, plain   415 mg/8 oz.   Sardines, [place], canned, with bones   351 mg/3 oz.   Oatmeal, instant, fortified   215 mg/1 cup   Nonfat milk   302 mg/1 cup   [place], sockeye, canned, with bones   239 mg/3 oz.   Tofu made with calcium sulfate   204 mg/3 oz.   Low-fat milk   297 mg/1 cup   Soybeans, fresh, boiled   131 mg/1/2 cup   Collards   179 mg/1/2 cup   Swiss cheese   272 mg/1 oz.   White beans, cooked   81 mg/1/2 cup   English muffin, whole wheat   175 mg/1 muffin   Cheddar cheese   205 mg/1 oz.   Navy beans, cooked   79 mg/1/2 cup   Kale   90 mg/1/2 cup   Ice cream strawberry   79 mg/1/2 cup           Orange, navel   56 mg/1 medium   Note: Calcium levels may vary depending on brand and size.  Daily calcium needs  14-18 years old: 1,300 mg  19-30 years old: 1,000 mg  31-50 years old: 1,000 mg  51-70 years old, women: 1,200 mg  51-70 years old, men: 1,000 mg  Pregnant or nursin-28 years old: 1,300 mg, 19-50 years old: 1,000 mg  Older than 70 (women and men): 1,200 mg   Date Last Reviewed: 10/17/2015    8149-0365 [employer]. [address] [place] [address] 18958. All rights reserved. This information is not intended as a substitute for professional medical care. Always follow your healthcare professional's instructions.        Living with Osteoporosis: Regular Exercise  If you have osteoporosis, exercise is vital for your health. It can prevent bone fractures and spine changes. It will slow bone loss. Exercise will strengthen your body. It can also be fun. A variety of exercises is best. See below for exercises that can help you. Before you start, though, talk to your healthcare provider to be sure these exercises are right for  you.    Resistance exercises. These build muscle strength and maintain bone mass. They also make you less prone to injury. Exercises include lifting small weights, doing push-ups and sit-ups, using elastic exercise bands, and using weight machines.  Weight-bearing activities. These help your whole body. They also help you maintain bone mass. Activities include walking, dancing, and housework.  Nonweight-bearing exercises. These help prevent back strain and pain. They do this by building the trunk and leg muscles. Exercises that help with flexibility can prevent falls. Examples include swimming, water exercise, and stretching.  Staying safe  Here are tips to stay safe:     Always check with your healthcare provider before starting any new exercise program.    Use weights only as instructed.    Stop any exercise that causes pain.   Date Last Reviewed: 10/17/2015    3217-4614 [employer]. [address] [place] [address], [place] [address]. All rights reserved. This information is not intended as a substitute for professional medical care. Always follow your healthcare professional's instructions.

## 2017-11-21 NOTE — PROGRESS NOTES
SUBJECTIVE:   Gabriela Agrawal is a 84 year old female who presents for Preventive Visit.  Chief Complaint   Patient presents with     Wellness Visit     wellness, renew meds, patient has only had black coffee this morning      Flu Shot     flu shot and pneumovax 23     Are you in the first 12 months of your Medicare Part B coverage?  No    Healthy Habits:    Do you get at least three servings of calcium containing foods daily (dairy, green leafy vegetables, etc.)? yes    Amount of exercise or daily activities, outside of work: 4 day(s) per week    Problems taking medications regularly No    Medication side effects: No    Have you had an eye exam in the past two years? no    Do you see a dentist twice per year? yes    Do you have sleep apnea, excessive snoring or daytime drowsiness?no    COGNITIVE SCREEN  1) Repeat 3 items (Banana, Sunrise, Chair)    2) Clock draw: NORMAL  3) 3 item recall: Recalls 3 objects  Results: 3 items recalled: COGNITIVE IMPAIRMENT LESS LIKELY    Mini-CogTM Copyright S Tony. Licensed by the author for use in [facility]; reprinted with permission (chidi@[facility]). All rights reserved.          Refills of BP meds and Lipid  meds needed    Hyperlipidemia Follow-Up      Rate your low fat/cholesterol diet?: good    Taking statin?  Yes, no muscle aches from statin    Other lipid medications/supplements?:  none    Hypertension Follow-up      Outpatient blood pressures are not being checked.    Low Salt Diet: low salt    No NSAID use.    Caffeine 2-3 cups in AM, no EtOH, tobacco, other OTC meds.      Reviewed and updated as needed this visit by clinical staffTobacco  Allergies  Meds  Problems  Med Hx  Surg Hx  Fam Hx  Soc Hx          Reviewed and updated as needed this visit by Provider  Allergies  Problems        Social History   Substance Use Topics     Smoking status: Former Smoker     Packs/day: 1.50     Years: 22.50     Quit date: 1/1/1982     Smokeless tobacco: Never Used      Alcohol use Yes      Comment: 4-6 a week       The patient does not drink >3 drinks per day nor >7 drinks per week.     Today's PHQ-2 Score:   PHQ-2 ( 1999 Pfizer) 11/21/2017 11/15/2016   Q1: Little interest or pleasure in doing things 0 0   Q2: Feeling down, depressed or hopeless 0 0   PHQ-2 Score 0 0       Do you feel safe in your environment - Yes    Do you have a Health Care Directive?: Yes: Advance Directive has been received and scanned. 2011    Current providers sharing in care for this patient include:   Patient Care Team:  Coco Mejia DO as PCP - General (Internal Medicine)      Hearing impairment: No    Ability to successfully perform activities of daily living: Yes, no assistance needed     Fall risk:  Fallen 2 or more times in the past year?: No  Any fall with injury in the past year?: No      Home safety:  lack of grab bars in the bathroom      The following health maintenance items are reviewed in Epic and correct as of today:  Health Maintenance   Topic Date Due     SKIN CANCER SCREENING Q6 MOS (NO IN BASKET)  08/08/1933     SKIN CANCER SCREENING Q1 YR (NO IN BASKET)  03/16/2016     FALL RISK ASSESSMENT  11/15/2017     ADVANCE DIRECTIVE PLANNING Q5 YRS  09/04/2019     TETANUS IMMUNIZATION (SYSTEM ASSIGNED)  02/10/2021     INFLUENZA VACCINE (SYSTEM ASSIGNED)  Completed     PNEUMOCOCCAL  Completed     Current Outpatient Prescriptions   Medication Sig Dispense Refill     lovastatin (MEVACOR) 20 MG tablet Take 1 tablet (20 mg) by mouth At Bedtime 90 tablet 3     losartan (COZAAR) 100 MG tablet Take 1 tablet (100 mg) by mouth daily 90 tablet 3     Cyanocobalamin (VITAMIN B-12 PO) Take 1 tablet by mouth daily       OVER-THE-COUNTER Tumeric-Curcumen 1 capsule daily , unknown dose       Calcium Carbonate-Vitamin D (CALCIUM + D PO) Take 1 tablet by mouth daily.       Magnesium Oxide, [id number], (MAG-200 PO) Take 1 tablet by mouth daily.       cholecalciferol (VITAMIN D) 1000 UNIT tablet Take 1  "tablet by mouth daily. 100 tablet 12     aspirin 81 MG tablet Take 1 tablet by mouth daily.  3     MULTI-VITAMIN OR TABS 1 TABLET DAILY       VITAMIN C OR 1 TABLET DAILY       [DISCONTINUED] lovastatin (MEVACOR) 20 MG tablet Take 1 tablet (20 mg) by mouth At Bedtime 90 tablet 3     [DISCONTINUED] losartan (COZAAR) 100 MG tablet Take 1 tablet (100 mg) by mouth daily 90 tablet 3     fish oil-omega-3 fatty acids 1000 MG capsule Take 1 g by mouth 2 times daily. 180 capsule 12         ROS: negative except as noted in HPI  Constitutional, HEENT, cardiovascular, pulmonary, GI, , musculoskeletal, neuro, skin, endocrine and psych systems are negative, except as otherwise noted.      OBJECTIVE:   /64 (BP Location: Left arm, Patient Position: Chair, Cuff Size: Adult Large)  Pulse 86  Temp 97  F (36.1  C) (Tympanic)  Ht 5' 6.5\" (1.689 m)  Wt 165 lb 3.2 oz (74.9 kg)  SpO2 94%  BMI 26.26 kg/m2 Estimated body mass index is 26.26 kg/(m^2) as calculated from the following:    Height as of this encounter: 5' 6.5\" (1.689 m).    Weight as of this encounter: 165 lb 3.2 oz (74.9 kg).  EXAM:   GENERAL: healthy, alert and no distress  EYES: Eyes grossly normal to inspection, PERRL and conjunctivae and sclerae normal  HENT: ear canals and TM's normal, nose and mouth without ulcers or lesions  NECK: no adenopathy, no asymmetry, masses, or scars and thyroid normal to palpation  RESP: lungs clear to auscultation - no rales, rhonchi or wheezes  CV: regular rate and rhythm, normal S1 S2, no S3 or S4, no murmur, click or rub, no peripheral edema and peripheral pulses strong  ABDOMEN: soft, nontender, no hepatosplenomegaly, no masses and bowel sounds normal  MS: no gross musculoskeletal defects noted, no edema  SKIN: no suspicious lesions or rashes  NEURO: Normal strength and tone, mentation intact and speech normal  PSYCH: mentation appears normal, affect normal/bright    ASSESSMENT / PLAN:   1. Encounter for general adult medical " "examination without abnormal findings    2. Hyperlipidemia LDL goal <130 -  - stable, refill provided  - lovastatin (MEVACOR) 20 MG tablet; Take 1 tablet (20 mg) by mouth At Bedtime  Dispense: 90 tablet; Refill: 3  - Lipid Profile (Chol, Trig, HDL, LDL calc); Future    3. Essential hypertension, benign - slightly uncontrolled. s tart checking with RN visits and home cuff.  If still high, would add HCTZ to losartan in combo pill.  Near goal of < 150 based on age  - losartan (COZAAR) 100 MG tablet; Take 1 tablet (100 mg) by mouth daily  Dispense: 90 tablet; Refill: 3  - Basic metabolic panel; Future    4. Need for prophylactic vaccination against Streptococcus pneumoniae (pneumococcus)  - Pneumococcal vaccine 23 valent PPSV23  (Pneumovax) [14950]  - ADMIN MEDICARE: Pneumococcal Vaccine ()    5. Need for prophylactic vaccination and inoculation against influenza  - FLU VACCINE, INCREASED ANTIGEN, PRESV FREE, AGE 65+ [35708]  - ADMIN INFLUENZA (For MEDICARE Patients ONLY) []    6. Osteopenia of multiple sites - discused weight bearing exercises.  Recheck 2018  - Vitamin D Deficiency; Future    End of Life Planning:  Patient currently has an advanced directive: Yes.  Practitioner is supportive of decision.    COUNSELING:  Reviewed preventive health counseling, as reflected in patient instructions        Estimated body mass index is 26.26 kg/(m^2) as calculated from the following:    Height as of this encounter: 5' 6.5\" (1.689 m).    Weight as of this encounter: 165 lb 3.2 oz (74.9 kg).     reports that she quit smoking about 35 years ago. She has a 33.75 pack-year smoking history. She has never used smokeless tobacco.        Appropriate preventive services were discussed with this patient, including applicable screening as appropriate for cardiovascular disease, diabetes, osteopenia/osteoporosis, and glaucoma.  As appropriate for age/gender, discussed screening for colorectal cancer, prostate cancer, breast " cancer, and cervical cancer. Checklist reviewing preventive services available has been given to the patient.    Reviewed patients plan of care and provided an AVS. The Intermediate Care Plan ( asthma action plan, low back pain action plan, and migraine action plan) for Gabriela meets the Care Plan requirement. This Care Plan has been established and reviewed with the Patient.    Counseling Resources:  ATP IV Guidelines  Pooled Cohorts Equation Calculator  Breast Cancer Risk Calculator  FRAX Risk Assessment  ICSI Preventive Guidelines  Dietary Guidelines for Americans, 2010  USDA's MyPlate  ASA Prophylaxis  Lung CA Screening    Coco Mejia, DO  [facility] Influenza Immunization Documentation    1.  Is the person to be vaccinated sick today?   No    2. Does the person to be vaccinated have an allergy to a component   of the vaccine?   No  Egg Allergy Algorithm Link    3. Has the person to be vaccinated ever had a serious reaction   to influenza vaccine in the past?   No    4. Has the person to be vaccinated ever had Guillain-Barré syndrome?   No    Form completed by Donovan AYERS CMA

## 2017-12-14 ENCOUNTER — ALLIED HEALTH/NURSE VISIT (OUTPATIENT)
Dept: FAMILY MEDICINE | Facility: CLINIC | Age: 82
End: 2017-12-14
Payer: MEDICARE

## 2017-12-14 VITALS — HEART RATE: 90 BPM | SYSTOLIC BLOOD PRESSURE: 141 MMHG | DIASTOLIC BLOOD PRESSURE: 83 MMHG

## 2017-12-14 DIAGNOSIS — I10 ESSENTIAL HYPERTENSION, BENIGN: ICD-10-CM

## 2017-12-14 DIAGNOSIS — I10 HTN (HYPERTENSION): Primary | ICD-10-CM

## 2017-12-14 PROCEDURE — 99207 ZZC NO CHARGE NURSE ONLY: CPT

## 2017-12-14 NOTE — NURSING NOTE
S-(situation): Patient in clinic today for bp check.  Taking losartan 100mg daily for bp control.  Denies s/s of high or low bp.  Patient's bp was elevated at [facility] 11-21-17 (notes below).  She returns today for a bp check.  Patient is not checking her bps at home.  She does not know how to use her home machine.  RN advised patient she can bring her machine to clinic and the RN can walk her through how to work her machine.  Patient plans to come to clinic every 2 weeks for bp checks.  Medications reviewed with patient.    OV notes 11-21-17:  Essential hypertension, benign - slightly uncontrolled. s tart checking with RN visits and home cuff.  If still high, would add HCTZ to losartan in combo pill.  Near goal of < 150 based on age  - losartan (COZAAR) 100 MG tablet; Take 1 tablet (100 mg) by mouth daily  Dispense: 90 tablet; Refill: 3    B-(background): Blood pressures in clinic:  1st reading = 146/81, HR - 91  2nd reading = 152/83, HR - 88  3rd reading = 141/83, HR - 90    A-(assessment): Bp at goal - see above, MD recommendations is goal <150.    R-(recommendations): Continue med regimen, healthy eating habits, and regular exercise.    Routing to provider.  Catalina AYERS RN

## 2017-12-14 NOTE — MR AVS SNAPSHOT
"              After Visit Summary   12/14/2017    Gabriela Agrawal    MRN: [id number]           Patient Information     Date Of Birth          8/8/1933        Visit Information        Provider Department      12/14/2017 11:15 AM [place] ARLENE QUICK [facility]        Today's Diagnoses     HTN (hypertension)    -  1    Essential hypertension, benign           Follow-ups after your visit        Your next 10 appointments already scheduled     Jan 22, 2018 11:45 AM CST   Return Visit with Wade Padgett MD   [facility]    [address] [facility] [id number] [phone]              Who to contact     If you have questions or need follow up information about today's clinic visit or your schedule please contact [facility] directly at [phone].  Normal or non-critical lab and imaging results will be communicated to you by MyChart, letter or phone within 4 business days after the clinic has received the results. If you do not hear from us within 7 days, please contact the clinic through MyChart or phone. If you have a critical or abnormal lab result, we will notify you by phone as soon as possible.  Submit refill requests through [email or web address] or call your pharmacy and they will forward the refill request to us. Please allow 3 business days for your refill to be completed.          Additional Information About Your Visit        MyChart Information     [email or web address] lets you send messages to your doctor, view your test results, renew your prescriptions, schedule appointments and more. To sign up, go to [email or web address] . Click on \"Log in\" on the left side of the screen, which will take you to the Welcome page. Then click on \"Sign up Now\" on the right side of the page.     You will be asked to enter the access code listed below, as well as some personal information. Please follow the directions to create your username and password.     Your " access code is: [email or web address]  Expires: 2018 10:23 AM     Your access code will  in 90 days. If you need help or a new code, please call your [facility] clinic or [phone].        Care EveryWhere ID     This is your Care EveryWhere ID. This could be used by other organizations to access your [facility] medical records  [id number]        Your Vitals Were     Pulse                   90            Blood Pressure from Last 3 Encounters:   17 141/83   17 146/72   17 164/78    Weight from Last 3 Encounters:   17 165 lb 3.2 oz (74.9 kg)   11/15/16 169 lb (76.7 kg)   12/11/15 162 lb (73.5 kg)              Today, you had the following     No orders found for display       Primary Care Provider Office Phone # Fax #    Coco Mejia,  [phone] [address] [address]        Equal Access to Services     YVONNE STRICKLAND : Hadii aad ku hadasho Soomaali, waaxda luqadaha, qaybta kaalmada adeegyada, waxay idiin hayaan sulma kharaana renee . So [facility] [phone].    ATENCIÓN: Si habla español, tiene a chavez disposición servicios gratuitos de asistencia lingüística. Llame al [phone].    We comply with applicable federal civil rights laws and [place] laws. We do not discriminate on the basis of race, color, national origin, age, disability, sex, sexual orientation, or gender identity.            Thank you!     Thank you for choosing [facility]  for your care. Our goal is always to provide you with excellent care. Hearing back from our patients is one way we can continue to improve our services. Please take a few minutes to complete the written survey that you may receive in the mail after your visit with us. Thank you!             Your Updated Medication List - Protect others around you: Learn how to safely use, store and throw away your medicines at www.disposemymeds.org.          This list is accurate as of: 17 11:36 AM.  Always  use your most recent med list.                   Brand Name Dispense Instructions for use Diagnosis    aspirin 81 MG tablet      Take 1 tablet by mouth daily.    UTI (urinary tract infection), Hypertension, CARDIOVASCULAR SCREENING; LDL GOAL LESS THAN 160       CALCIUM + D PO      Take 1 tablet by mouth daily.        cholecalciferol 1000 UNIT tablet    vitamin D3    100 tablet    Take 1 tablet by mouth daily.    UTI (urinary tract infection), Hypertension, CARDIOVASCULAR SCREENING; LDL GOAL LESS THAN 160       fish oil-omega-3 fatty acids 1000 MG capsule     180 capsule    Take 1 g by mouth 2 times daily.    UTI (urinary tract infection), Hypertension, CARDIOVASCULAR SCREENING; LDL GOAL LESS THAN 160       losartan 100 MG tablet    COZAAR    90 tablet    Take 1 tablet (100 mg) by mouth daily    Essential hypertension, benign       lovastatin 20 MG tablet    MEVACOR    90 tablet    Take 1 tablet (20 mg) by mouth At Bedtime    Hyperlipidemia LDL goal <130       MAG-200 PO      Take 1 tablet by mouth daily.    Microscopic hematuria       Multi-vitamin Tabs tablet   Generic drug:  multivitamin, therapeutic with minerals      1 TABLET DAILY        OVER-THE-COUNTER      Tumeric-Curcumen 1 capsule daily , unknown dose        VITAMIN B-12 PO      Take 1 tablet by mouth daily        VITAMIN C PO      1 TABLET DAILY

## 2018-01-08 ENCOUNTER — ALLIED HEALTH/NURSE VISIT (OUTPATIENT)
Dept: FAMILY MEDICINE | Facility: CLINIC | Age: 83
End: 2018-01-08
Payer: MEDICARE

## 2018-01-08 VITALS — HEART RATE: 88 BPM | DIASTOLIC BLOOD PRESSURE: 60 MMHG | SYSTOLIC BLOOD PRESSURE: 142 MMHG

## 2018-01-08 DIAGNOSIS — I10 HTN (HYPERTENSION): Primary | ICD-10-CM

## 2018-01-08 PROCEDURE — 99207 ZZC NO CHARGE NURSE ONLY: CPT

## 2018-01-08 NOTE — MR AVS SNAPSHOT
"              After Visit Summary   1/8/2018    Gabriela Agrawal    MRN: [id number]           Patient Information     Date Of Birth          8/8/1933        Visit Information        Provider Department      1/8/2018 10:45 AM FL CL RN [facility]        Today's Diagnoses     HTN (hypertension)    -  1       Follow-ups after your visit        Your next 10 appointments already scheduled     Jan 22, 2018 11:45 AM CST   Return Visit with Wade Padgett MD   [facility]    [address] [address] [phone]              Who to contact     If you have questions or need follow up information about today's clinic visit or your schedule please contact [facility] directly at [phone].  Normal or non-critical lab and imaging results will be communicated to you by MyChart, letter or phone within 4 business days after the clinic has received the results. If you do not hear from us within 7 days, please contact the clinic through MyChart or phone. If you have a critical or abnormal lab result, we will notify you by phone as soon as possible.  Submit refill requests through [employer] or call your pharmacy and they will forward the refill request to us. Please allow 3 business days for your refill to be completed.          Additional Information About Your Visit        MyChart Information     [employer] lets you send messages to your doctor, view your test results, renew your prescriptions, schedule appointments and more. To sign up, go to [email or web address] . Click on \"Log in\" on the left side of the screen, which will take you to the Welcome page. Then click on \"Sign up Now\" on the right side of the page.     You will be asked to enter the access code listed below, as well as some personal information. Please follow the directions to create your username and password.     Your access code is: [email or web address]  Expires: " 2018 10:23 AM     Your access code will  in 90 days. If you need help or a new code, please call your [place] clinic or [phone].        Care EveryWhere ID     This is your Care EveryWhere ID. This could be used by other organizations to access your [place] medical records  [id number]        Your Vitals Were     Pulse                   88            Blood Pressure from Last 3 Encounters:   18 142/60   17 141/83   17 146/72    Weight from Last 3 Encounters:   17 165 lb 3.2 oz (74.9 kg)   11/15/16 169 lb (76.7 kg)   12/11/15 162 lb (73.5 kg)              Today, you had the following     No orders found for display       Primary Care Provider Office Phone # Fax #    Coco Mejia  [phone] [address] [facility] [address] [facility] [address]        Equal Access to Services     YVONNE STRICKLAND : Hadii aad ku hadasho Soomaali, waaxda luqadaha, qaybta kaalmada adeegyada, waxay idiin hayjosen sulma renee . So [facility] [phone].    ATENCIÓN: Si habla español, tiene a chavez disposición servicios gratuitos de asistencia lingüística. Llame al [phone].    We comply with applicable federal civil rights laws and [place] laws. We do not discriminate on the basis of race, color, national origin, age, disability, sex, sexual orientation, or gender identity.            Thank you!     Thank you for choosing [facility]  for your care. Our goal is always to provide you with excellent care. Hearing back from our patients is one way we can continue to improve our services. Please take a few minutes to complete the written survey that you may receive in the mail after your visit with us. Thank you!             Your Updated Medication List - Protect others around you: Learn how to safely use, store and throw away your medicines at www.disposemymeds.org.          This list is accurate as of: 18 10:56 AM.  Always use your most recent med list.                    Brand Name Dispense Instructions for use Diagnosis    aspirin 81 MG tablet      Take 1 tablet by mouth daily.    UTI (urinary tract infection), Hypertension, CARDIOVASCULAR SCREENING; LDL GOAL LESS THAN 160       CALCIUM + D PO      Take 1 tablet by mouth daily.        cholecalciferol 1000 UNIT tablet    vitamin D3    100 tablet    Take 1 tablet by mouth daily.    UTI (urinary tract infection), Hypertension, CARDIOVASCULAR SCREENING; LDL GOAL LESS THAN 160       fish oil-omega-3 fatty acids 1000 MG capsule     180 capsule    Take 1 g by mouth 2 times daily.    UTI (urinary tract infection), Hypertension, CARDIOVASCULAR SCREENING; LDL GOAL LESS THAN 160       losartan 100 MG tablet    COZAAR    90 tablet    Take 1 tablet (100 mg) by mouth daily    Essential hypertension, benign       lovastatin 20 MG tablet    MEVACOR    90 tablet    Take 1 tablet (20 mg) by mouth At Bedtime    Hyperlipidemia LDL goal <130       MAG-200 PO      Take 1 tablet by mouth daily.    Microscopic hematuria       Multi-vitamin Tabs tablet   Generic drug:  multivitamin, therapeutic with minerals      1 TABLET DAILY        OVER-THE-COUNTER      Tumeric-Curcumen 1 capsule daily , unknown dose        VITAMIN B-12 PO      Take 1 tablet by mouth daily        VITAMIN C PO      1 TABLET DAILY

## 2018-01-08 NOTE — NURSING NOTE
Vitals:    01/08/18 1053 01/08/18 1054   BP: 144/60 142/60   BP Location: Right arm Right arm   Patient Position: Sitting Sitting   Cuff Size: Adult Large Adult Regular   Pulse: 88      B/P check.  Pt on Losartan, no SOB or swelling of ankles.  No refill needed.KpavelRN

## 2018-01-26 ENCOUNTER — ALLIED HEALTH/NURSE VISIT (OUTPATIENT)
Dept: FAMILY MEDICINE | Facility: CLINIC | Age: 83
End: 2018-01-26
Payer: MEDICARE

## 2018-01-26 VITALS — SYSTOLIC BLOOD PRESSURE: 144 MMHG | HEART RATE: 86 BPM | DIASTOLIC BLOOD PRESSURE: 78 MMHG

## 2018-01-26 DIAGNOSIS — Z01.30 BLOOD PRESSURE CHECK: Primary | ICD-10-CM

## 2018-01-26 PROCEDURE — 99207 ZZC NO CHARGE NURSE ONLY: CPT

## 2018-01-26 NOTE — MR AVS SNAPSHOT
"              After Visit Summary   1/26/2018    Gabriela Agrawal    MRN: [id number]           Patient Information     Date Of Birth          8/8/1933        Visit Information        Provider Department      1/26/2018 1:00 PM FL CL RN [facility]        Today's Diagnoses     Blood pressure check    -  1       Follow-ups after your visit        Your next 10 appointments already scheduled     Jan 26, 2018  1:00 PM CST   Nurse Only with JOSE ALBERTO JACOBS RN   [facility] ([facility])    [address]  [facility] [address] [phone]            Feb 26, 2018 11:45 AM CST   Return Visit with Wade Padgett MD   [facility]    [address] [id number] [phone]              Who to contact     If you have questions or need follow up information about today's clinic visit or your schedule please contact [facility] directly at [phone].  Normal or non-critical lab and imaging results will be communicated to you by MyChart, letter or phone within 4 business days after the clinic has received the results. If you do not hear from us within 7 days, please contact the clinic through [employer] or phone. If you have a critical or abnormal lab result, we will notify you by phone as soon as possible.  Submit refill requests through [employer] or call your pharmacy and they will forward the refill request to us. Please allow 3 business days for your refill to be completed.          Additional Information About Your Visit        MyChart Information     [employer] lets you send messages to your doctor, view your test results, renew your prescriptions, schedule appointments and more. To sign up, go to [email or web address] . Click on \"Log in\" on the left side of the screen, which will take you to the Welcome page. Then click on \"Sign up Now\" on the right side of the page.     You will be asked " to enter the access code listed below, as well as some personal information. Please follow the directions to create your username and password.     Your access code is: [email or web address]  Expires: 2018 10:23 AM     Your access code will  in 90 days. If you need help or a new code, please call your [place] clinic or [phone].        Care EveryWhere ID     This is your Care EveryWhere ID. This could be used by other organizations to access your [place] medical records  [id number]        Your Vitals Were     Pulse                   86            Blood Pressure from Last 3 Encounters:   18 144/78   18 142/60   17 141/83    Weight from Last 3 Encounters:   17 165 lb 3.2 oz (74.9 kg)   11/15/16 169 lb (76.7 kg)   12/11/15 162 lb (73.5 kg)              Today, you had the following     No orders found for display       Primary Care Provider Office Phone # Fax #    Coco Camilla Mejia [phone] [address] [facility] [address]        Equal Access to Services     [facility] : Hadii aad ku hadasho Soomaali, waaxda luqadaha, qaybta kaalmada adeegyada, willi matan sulma renee . So [facility] [phone].    ATENCIÓN: Si habla español, tiene a chavez disposición servicios gratuitos de asistencia lingüística. Llame al [phone].    We comply with applicable federal civil rights laws and [place] laws. We do not discriminate on the basis of race, color, national origin, age, disability, sex, sexual orientation, or gender identity.            Thank you!     Thank you for choosing [facility]  for your care. Our goal is always to provide you with excellent care. Hearing back from our patients is one way we can continue to improve our services. Please take a few minutes to complete the written survey that you may receive in the mail after your visit with us. Thank you!             Your Updated Medication List - Protect others around you:  Learn how to safely use, store and throw away your medicines at www.disposemymeds.org.          This list is accurate as of 1/26/18 12:21 PM.  Always use your most recent med list.                   Brand Name Dispense Instructions for use Diagnosis    aspirin 81 MG tablet      Take 1 tablet by mouth daily.    UTI (urinary tract infection), Hypertension, CARDIOVASCULAR SCREENING; LDL GOAL LESS THAN 160       CALCIUM + D PO      Take 1 tablet by mouth daily.        cholecalciferol 1000 UNIT tablet    vitamin D3    100 tablet    Take 1 tablet by mouth daily.    UTI (urinary tract infection), Hypertension, CARDIOVASCULAR SCREENING; LDL GOAL LESS THAN 160       fish oil-omega-3 fatty acids 1000 MG capsule     180 capsule    Take 1 g by mouth 2 times daily.    UTI (urinary tract infection), Hypertension, CARDIOVASCULAR SCREENING; LDL GOAL LESS THAN 160       losartan 100 MG tablet    COZAAR    90 tablet    Take 1 tablet (100 mg) by mouth daily    Essential hypertension, benign       lovastatin 20 MG tablet    MEVACOR    90 tablet    Take 1 tablet (20 mg) by mouth At Bedtime    Hyperlipidemia LDL goal <130       MAG-200 PO      Take 1 tablet by mouth daily.    Microscopic hematuria       Multi-vitamin Tabs tablet   Generic drug:  multivitamin, therapeutic with minerals      1 TABLET DAILY        OVER-THE-COUNTER      Tumeric-Curcumen 1 capsule daily , unknown dose        VITAMIN B-12 PO      Take 1 tablet by mouth daily        VITAMIN C PO      1 TABLET DAILY

## 2018-01-26 NOTE — NURSING NOTE
Asymptomatic today, current Losartan dose is working well, no swelling. Working on diet and exercise. Will route to PCP to review.

## 2018-02-26 ENCOUNTER — OFFICE VISIT (OUTPATIENT)
Dept: DERMATOLOGY | Facility: CLINIC | Age: 83
End: 2018-02-26
Payer: MEDICARE

## 2018-02-26 VITALS — HEART RATE: 88 BPM | SYSTOLIC BLOOD PRESSURE: 152 MMHG | DIASTOLIC BLOOD PRESSURE: 82 MMHG | OXYGEN SATURATION: 95 %

## 2018-02-26 DIAGNOSIS — L82.0 INFLAMED SEBORRHEIC KERATOSIS: ICD-10-CM

## 2018-02-26 DIAGNOSIS — Z85.820 HISTORY OF MELANOMA: Primary | ICD-10-CM

## 2018-02-26 DIAGNOSIS — L82.1 SK (SEBORRHEIC KERATOSIS): ICD-10-CM

## 2018-02-26 DIAGNOSIS — L81.4 LENTIGO: ICD-10-CM

## 2018-02-26 DIAGNOSIS — L57.0 AK (ACTINIC KERATOSIS): ICD-10-CM

## 2018-02-26 DIAGNOSIS — D18.00 ANGIOMA: ICD-10-CM

## 2018-02-26 DIAGNOSIS — Z85.828 HISTORY OF SKIN CANCER: ICD-10-CM

## 2018-02-26 PROCEDURE — 99213 OFFICE O/P EST LOW 20 MIN: CPT | Mod: 25 | Performed by: DERMATOLOGY

## 2018-02-26 PROCEDURE — 17000 DESTRUCT PREMALG LESION: CPT | Mod: 59 | Performed by: DERMATOLOGY

## 2018-02-26 PROCEDURE — 17110 DESTRUCTION B9 LES UP TO 14: CPT | Performed by: DERMATOLOGY

## 2018-02-26 PROCEDURE — 17003 DESTRUCT PREMALG LES 2-14: CPT | Performed by: DERMATOLOGY

## 2018-02-26 NOTE — MR AVS SNAPSHOT
After Visit Summary   2/26/2018    Gabriela Agrawal    MRN: [id number]           Patient Information     Date Of Birth          8/8/1933        Visit Information        Provider Department      2/26/2018 11:45 AM Wade Padgett MD [facility]        Care Instructions    WOUND CARE INSTRUCTIONS   FOR CRYOSURGERY   This area treated with liquid nitrogen will form a blister. You do not need to bandage the area until after the blister forms and breaks (which may be a few days). When the blister breaks, begin daily dressing changes as follows:   1) Clean and dry the area with tap water using clean Q-tip or sterile gauze pad.   2) Apply Polysporin ointment or Bacitracin ointment over entire wound. Do NOT use Neosporin ointment.   3) Cover the wound with a band-aid or sterile non-stick gauze pad and micropore paper tape.   REPEAT THESE INSTRUCTIONS AT LEAST ONCE A DAY UNTIL THE WOUND HAS COMPLETELY HEALED.   It is an old wives tale that a wound heals better when it is exposed to air and allowed to dry out. The wound will heal faster with a better cosmetic result if it is kept moist with ointment and covered with a bandage.   Do not let the wound dry out.   IMPORTANT INFORMATION ON REVERSE SIDE   Supplies Needed:   *Cotton tipped applicators (Q-tips)   *Polysporin ointment or Bacitracin ointment (NOT NEOSPORIN)   *Band-aids, or non stick gauze pads and micropore paper tape   PATIENT INFORMATION   During the healing process you will notice a number of changes. All wounds develop a small halo of redness surrounding the wound. This means healing is occurring. Severe itching with extensive redness usually indicates sensitivity to the ointment or bandage tape used to dress the wound. You should call our office if this develops.   Swelling and/or discoloration around your surgical site is common, particularly when performed around the eye.   All wounds normally drain. The larger the wound the more  "drainage there will be. After 7-10 days, you will notice the wound beginning to shrink and new skin will begin to grow. The wound is healed when you can see skin has formed over the entire area. A healed wound has a healthy, shiny look to the surface and is red to dark pink in color to normalize. Wounds may take approximately 4-6 weeks to heal. Larger wounds may take 6-8 weeks. After the wound is healed you may discontinue dressing changes.   You may experience a sensation of tightness as your wound heals. This is normal and will gradually subside.   Your healed wound may be sensitive to temperature changes. This sensitivity improves with time, but if you re having a lot of discomfort, try to avoid temperature extremes.   Patients frequently experience itching after their wound appears to have healed because of the continue healing under the skin. Plain Vaseline will help relieve the itching.                 Follow-ups after your visit        Who to contact     If you have questions or need follow up information about today's clinic visit or your schedule please contact [facility] directly at [phone].  Normal or non-critical lab and imaging results will be communicated to you by [employer], letter or phone within 4 business days after the clinic has received the results. If you do not hear from us within 7 days, please contact the clinic through [employer] or phone. If you have a critical or abnormal lab result, we will notify you by phone as soon as possible.  Submit refill requests through [employer] or call your pharmacy and they will forward the refill request to us. Please allow 3 business days for your refill to be completed.          Additional Information About Your Visit        [employer] Information     [employer] lets you send messages to your doctor, view your test results, renew your prescriptions, schedule appointments and more. To sign up, go to [email or web address] . Click on \"Log in\" on the " "left side of the screen, which will take you to the Welcome page. Then click on \"Sign up Now\" on the right side of the page.     You will be asked to enter the access code listed below, as well as some personal information. Please follow the directions to create your username and password.     Your access code is: [email or web address]  Expires: 2018 12:01 PM     Your access code will  in 90 days. If you need help or a new code, please call your [place] clinic or [phone].        Care EveryWhere ID     This is your Care EveryWhere ID. This could be used by other organizations to access your [place] medical records  [id number]        Your Vitals Were     Pulse Pulse Oximetry                88 95%           Blood Pressure from Last 3 Encounters:   18 152/82   18 144/78   18 142/60    Weight from Last 3 Encounters:   17 74.9 kg (165 lb 3.2 oz)   11/15/16 76.7 kg (169 lb)   12/11/15 73.5 kg (162 lb)              Today, you had the following     No orders found for display       Primary Care Provider Office Phone # Fax #    Coco MejiaDO [phone] [address] [facility] [address]        Equal Access to Services     YVONNE STRICKLAND : Hadii aric ku hadasho Soomaali, waaxda luqadaha, qaybta kaalmada adeegyada, willi olivera. So [facility] [phone].    ATENCIÓN: Si habla español, tiene a chavez disposición servicios gratuitos de asistencia lingüística. Llame al [phone].    We comply with applicable federal civil rights laws and [place] laws. We do not discriminate on the basis of race, color, national origin, age, disability, sex, sexual orientation, or gender identity.            Thank you!     Thank you for choosing [facility]  for your care. Our goal is always to provide you with excellent care. Hearing back from our patients is one way we can continue to improve our services. Please take a few minutes to complete " the written survey that you may receive in the mail after your visit with us. Thank you!             Your Updated Medication List - Protect others around you: Learn how to safely use, store and throw away your medicines at www.disposemymeds.org.          This list is accurate as of 2/26/18 12:01 PM.  Always use your most recent med list.                   Brand Name Dispense Instructions for use Diagnosis    aspirin 81 MG tablet      Take 1 tablet by mouth daily.    UTI (urinary tract infection), Hypertension, CARDIOVASCULAR SCREENING; LDL GOAL LESS THAN 160       CALCIUM + D PO      Take 1 tablet by mouth daily.        cholecalciferol 1000 UNIT tablet    vitamin D3    100 tablet    Take 1 tablet by mouth daily.    UTI (urinary tract infection), Hypertension, CARDIOVASCULAR SCREENING; LDL GOAL LESS THAN 160       fish oil-omega-3 fatty acids 1000 MG capsule     180 capsule    Take 1 g by mouth 2 times daily.    UTI (urinary tract infection), Hypertension, CARDIOVASCULAR SCREENING; LDL GOAL LESS THAN 160       losartan 100 MG tablet    COZAAR    90 tablet    Take 1 tablet (100 mg) by mouth daily    Essential hypertension, benign       lovastatin 20 MG tablet    MEVACOR    90 tablet    Take 1 tablet (20 mg) by mouth At Bedtime    Hyperlipidemia LDL goal <130       MAG-200 PO      Take 1 tablet by mouth daily.    Microscopic hematuria       Multi-vitamin Tabs tablet   Generic drug:  multivitamin, therapeutic with minerals      1 TABLET DAILY        OVER-THE-COUNTER      Tumeric-Curcumen 1 capsule daily , unknown dose        VITAMIN B-12 PO      Take 1 tablet by mouth daily        VITAMIN C PO      1 TABLET DAILY

## 2018-02-26 NOTE — LETTER
2/26/2018         RE: Gabriela Agrawal  [address] [facility] [place] [address]        Dear Colleague,    Thank you for referring your patient, Gabriela Agrawal, to the [facility]. Please see a copy of my visit note below.    Gabriela Agrawal is a 84 year old year old female patient here today for f/u hx of non-melanoma skin cancer and melanoma.  Today she notes some rough spotson face.   .  Patient states this has been present for a while.  Patient reports the following symptoms:  rough.  Patient reports the following previous treatments none.  Patient reports the following modifying factors none.  Associated symptoms: none.  Patient has no other skin complaints today.  Remainder of the HPI, Meds, PMH, Allergies, FH, and SH was reviewed in chart.    Pertinent Hx:   Melanoma, non-melanoma skin cancer   Past Medical History:   Diagnosis Date     Basal cell carcinoma      Bronchiolitis obliterans (H)      Diffuse cystic mastopathy     Bresat Fibrocystic Disease     Disorder of bone and cartilage, unspecified      Malignant melanoma (H)      Nonspecific (abnormal) findings on radiological and other examination of lung field      Other abnormal blood chemistry      Other specified alveolar and parietoalveolar pneumonopathies      Squamous cell carcinoma      Syncope and collapse     2007 holter neg       Past Surgical History:   Procedure Laterality Date     APPENDECTOMY OPEN       BREAST SURGERY      biopsy     COLONOSCOPY  2/8/2013    Procedure: COLONOSCOPY;  Colonoscopy  ;  Surgeon: Norah Whaley MD;  Location: [facility]     EYE SURGERY      keratoplasty     HERNIA REPAIR      left inginal     HYSTERECTOMY      including ovaries     KNEE SURGERY      2010, 2009     LUNG SURGERY      biopsy     melanoma  resection          Family History   Problem Relation Age of Onset     CANCER Other      Family History Negative Other        Social History     Social History     Marital status:       Spouse name: N/A     Number of children: N/A     Years of education: N/A     Occupational History      Retired     Social History Main Topics     Smoking status: Former Smoker     Packs/day: 1.50     Years: 22.50     Quit date: 1/1/1982     Smokeless tobacco: Never Used     Alcohol use Yes      Comment: 4-6 a week     Drug use: No     Sexual activity: Not on file     Other Topics Concern     Parent/Sibling W/ Cabg, Mi Or Angioplasty Before 65f 55m? No     Social History Narrative       Outpatient Encounter Prescriptions as of 2/26/2018   Medication Sig Dispense Refill     lovastatin (MEVACOR) 20 MG tablet Take 1 tablet (20 mg) by mouth At Bedtime 90 tablet 3     losartan (COZAAR) 100 MG tablet Take 1 tablet (100 mg) by mouth daily 90 tablet 3     Cyanocobalamin (VITAMIN B-12 PO) Take 1 tablet by mouth daily       OVER-THE-COUNTER Tumeric-Curcumen 1 capsule daily , unknown dose       Calcium Carbonate-Vitamin D (CALCIUM + D PO) Take 1 tablet by mouth daily.       Magnesium Oxide, 0819685926, (MAG-200 PO) Take 1 tablet by mouth daily.       fish oil-omega-3 fatty acids 1000 MG capsule Take 1 g by mouth 2 times daily. 180 capsule 12     cholecalciferol (VITAMIN D) 1000 UNIT tablet Take 1 tablet by mouth daily. 100 tablet 12     aspirin 81 MG tablet Take 1 tablet by mouth daily.  3     MULTI-VITAMIN OR TABS 1 TABLET DAILY       VITAMIN C OR 1 TABLET DAILY       No facility-administered encounter medications on file as of 2/26/2018.              Review Of Systems  Skin: As above  Eyes: negative  Ears/Nose/Throat: negative  Respiratory: No shortness of breath, dyspnea on exertion, cough, or hemoptysis  Cardiovascular: negative  Gastrointestinal: negative  Genitourinary: negative  Musculoskeletal: negative  Neurologic: negative  Psychiatric: negative  Hematologic/Lymphatic/Immunologic: negative  Endocrine: negative      O:   NAD, WDWN, Alert & Oriented, Mood & Affect wnl, Vitals stable   Here today alone   BP  152/82  Pulse 88  SpO2 95%   General appearance normal   Vitals stable   Alert, oriented and in no acute distress      Following lymph nodes palpated: Occipital, Cervical, Supraclavicular , axilla no lad   Stuck on papules and brown macules on trunk and ext    Gritty papules nose   Inflamed seborrheic keratosis R jawline and L cheek x3   Red papules on trunk         The remainder of the full exam was unremarkable; the following areas were examined:  conjunctiva/lids, oral mucosa, neck, peripheral vascular system, abdomen, lymph nodes, digits/nails, eccrine and apocrine glands, scalp/hair, face, neck, chest, abdomen, buttocks, back, RUE, LUE, RLE, LLE       Eyes: Conjunctivae/lids:Normal     ENT: Lips, buccal mucosa, tongue: normal    MSK:Normal    Cardiovascular: peripheral edema none    Pulm: Breathing Normal    Lymph Nodes: No Head and Neck Lymphadenopathy     Neuro/Psych: Orientation:Normal; Mood/Affect:Normal      A/P:  1. Nose actinic keratosis x4  LN2:  Treated with LN2 for 5s for 1-2 cycles. Warned risks of blistering, pain, pigment change, scarring, and incomplete resolution.  Advised patient to return if lesions do not completely resolve.  Wound care sheet given.  2. iskx5  LN2:  Treated with LN2 for 5s for 1-2 cycles. Warned risks of blistering, pain, pigment change, scarring, and incomplete resolution.  Advised patient to return if lesions do not completely resolve.  Wound care sheet given.  3. Seborrheic keratosis, lentigo, angioma, hx of melanoma, non-melanoma skin cancer   MELANOMA DISCUSSED WITH PATIENT:  I discussed the specifics of tumor, prognosis, metachronous melanoma, self exam, and genetics with the patient. I explained the need for monthly skin exams including and taught the patient how to do this. Patient was asked about new or changing moles and a full skin exam was performed.   BENIGN LESIONS DISCUSSED WITH PATIENT:  I discussed the specifics of tumor, prognosis, and genetics of benign  lesions.  I explained that treatment of these lesions would be purely cosmetic and not medically neccessary.  I discussed with patient different removal options including excision, cautery and /or laser.      Nature and genetics of benign skin lesions dicussed with patient.  Signs and Symptoms of skin cancer discussed with patient.  ABCDEs of melanoma reviewed with patient.  Patient encouraged to perform monthly skin exams.  UV precautions reviewed with patient.  Skin care regimen reviewed with patient: Eliminate harsh soaps, i.e. Dial, zest, irsih spring; Mild soaps such as Cetaphil or Dove sensitive skin, avoid hot or cold showers, aggressive use of emollients including vanicream, cetaphil or cerave discussed with patient.    Risks of non-melanoma skin cancer discussed with patient   Return to clinic 12 months      Again, thank you for allowing me to participate in the care of your patient.        Sincerely,        Wade Padgett MD

## 2018-02-26 NOTE — NURSING NOTE
"Chief Complaint   Patient presents with     Derm Problem     skin check       Initial /82  Pulse 88  SpO2 95% Estimated body mass index is 26.26 kg/(m^2) as calculated from the following:    Height as of 11/21/17: 1.689 m (5' 6.5\").    Weight as of 11/21/17: 74.9 kg (165 lb 3.2 oz).  BP completed using cuff size: adina Foster LPN    "

## 2018-02-26 NOTE — PROGRESS NOTES
Gabriela Agrawal is a 84 year old year old female patient here today for f/u hx of non-melanoma skin cancer and melanoma.  Today she notes some rough spotson face.   .  Patient states this has been present for a while.  Patient reports the following symptoms:  rough.  Patient reports the following previous treatments none.  Patient reports the following modifying factors none.  Associated symptoms: none.  Patient has no other skin complaints today.  Remainder of the HPI, Meds, PMH, Allergies, FH, and SH was reviewed in chart.    Pertinent Hx:   Melanoma, non-melanoma skin cancer   Past Medical History:   Diagnosis Date     Basal cell carcinoma      Bronchiolitis obliterans (H)      Diffuse cystic mastopathy     Bresat Fibrocystic Disease     Disorder of bone and cartilage, unspecified      Malignant melanoma (H)      Nonspecific (abnormal) findings on radiological and other examination of lung field      Other abnormal blood chemistry      Other specified alveolar and parietoalveolar pneumonopathies      Squamous cell carcinoma      Syncope and collapse     2007 holter neg       Past Surgical History:   Procedure Laterality Date     APPENDECTOMY OPEN       BREAST SURGERY      biopsy     COLONOSCOPY  2/8/2013    Procedure: COLONOSCOPY;  Colonoscopy  ;  Surgeon: Norah Whaley MD;  Location: [facility]     EYE SURGERY      keratoplasty     HERNIA REPAIR      left inginal     HYSTERECTOMY      including ovaries     KNEE SURGERY      2010, 2009     LUNG SURGERY      biopsy     melanoma  resection          Family History   Problem Relation Age of Onset     CANCER Other      Family History Negative Other        Social History     Social History     Marital status:      Spouse name: N/A     Number of children: N/A     Years of education: N/A     Occupational History      Retired     Social History Main Topics     Smoking status: Former Smoker     Packs/day: 1.50     Years: 22.50     Quit date: 1/1/1982      Smokeless tobacco: Never Used     Alcohol use Yes      Comment: 4-6 a week     Drug use: No     Sexual activity: Not on file     Other Topics Concern     Parent/Sibling W/ Cabg, Mi Or Angioplasty Before 65f 55m? No     Social History Narrative       Outpatient Encounter Prescriptions as of 2/26/2018   Medication Sig Dispense Refill     lovastatin (MEVACOR) 20 MG tablet Take 1 tablet (20 mg) by mouth At Bedtime 90 tablet 3     losartan (COZAAR) 100 MG tablet Take 1 tablet (100 mg) by mouth daily 90 tablet 3     Cyanocobalamin (VITAMIN B-12 PO) Take 1 tablet by mouth daily       OVER-THE-COUNTER Tumeric-Curcumen 1 capsule daily , unknown dose       Calcium Carbonate-Vitamin D (CALCIUM + D PO) Take 1 tablet by mouth daily.       Magnesium Oxide, [id number], (MAG-200 PO) Take 1 tablet by mouth daily.       fish oil-omega-3 fatty acids 1000 MG capsule Take 1 g by mouth 2 times daily. 180 capsule 12     cholecalciferol (VITAMIN D) 1000 UNIT tablet Take 1 tablet by mouth daily. 100 tablet 12     aspirin 81 MG tablet Take 1 tablet by mouth daily.  3     MULTI-VITAMIN OR TABS 1 TABLET DAILY       VITAMIN C OR 1 TABLET DAILY       No facility-administered encounter medications on file as of 2/26/2018.              Review Of Systems  Skin: As above  Eyes: negative  Ears/Nose/Throat: negative  Respiratory: No shortness of breath, dyspnea on exertion, cough, or hemoptysis  Cardiovascular: negative  Gastrointestinal: negative  Genitourinary: negative  Musculoskeletal: negative  Neurologic: negative  Psychiatric: negative  Hematologic/Lymphatic/Immunologic: negative  Endocrine: negative      O:   NAD, WDWN, Alert & Oriented, Mood & Affect wnl, Vitals stable   Here today alone   /82  Pulse 88  SpO2 95%   General appearance normal   Vitals stable   Alert, oriented and in no acute distress      Following lymph nodes palpated: Occipital, Cervical, Supraclavicular , axilla no lad   Stuck on papules and brown macules on  trunk and ext    Gritty papules nose   Inflamed seborrheic keratosis R jawline and L cheek x3   Red papules on trunk         The remainder of the full exam was unremarkable; the following areas were examined:  conjunctiva/lids, oral mucosa, neck, peripheral vascular system, abdomen, lymph nodes, digits/nails, eccrine and apocrine glands, scalp/hair, face, neck, chest, abdomen, buttocks, back, RUE, LUE, RLE, LLE       Eyes: Conjunctivae/lids:Normal     ENT: Lips, buccal mucosa, tongue: normal    MSK:Normal    Cardiovascular: peripheral edema none    Pulm: Breathing Normal    Lymph Nodes: No Head and Neck Lymphadenopathy     Neuro/Psych: Orientation:Normal; Mood/Affect:Normal      A/P:  1. Nose actinic keratosis x4  LN2:  Treated with LN2 for 5s for 1-2 cycles. Warned risks of blistering, pain, pigment change, scarring, and incomplete resolution.  Advised patient to return if lesions do not completely resolve.  Wound care sheet given.  2. iskx5  LN2:  Treated with LN2 for 5s for 1-2 cycles. Warned risks of blistering, pain, pigment change, scarring, and incomplete resolution.  Advised patient to return if lesions do not completely resolve.  Wound care sheet given.  3. Seborrheic keratosis, lentigo, angioma, hx of melanoma, non-melanoma skin cancer   MELANOMA DISCUSSED WITH PATIENT:  I discussed the specifics of tumor, prognosis, metachronous melanoma, self exam, and genetics with the patient. I explained the need for monthly skin exams including and taught the patient how to do this. Patient was asked about new or changing moles and a full skin exam was performed.   BENIGN LESIONS DISCUSSED WITH PATIENT:  I discussed the specifics of tumor, prognosis, and genetics of benign lesions.  I explained that treatment of these lesions would be purely cosmetic and not medically neccessary.  I discussed with patient different removal options including excision, cautery and /or laser.      Nature and genetics of benign skin  lesions dicussed with patient.  Signs and Symptoms of skin cancer discussed with patient.  ABCDEs of melanoma reviewed with patient.  Patient encouraged to perform monthly skin exams.  UV precautions reviewed with patient.  Skin care regimen reviewed with patient: Eliminate harsh soaps, i.e. Dial, zest, irsih spring; Mild soaps such as Cetaphil or Dove sensitive skin, avoid hot or cold showers, aggressive use of emollients including vanicream, cetaphil or cerave discussed with patient.    Risks of non-melanoma skin cancer discussed with patient   Return to clinic 12 months

## 2018-08-24 ENCOUNTER — HOSPITAL ENCOUNTER (OUTPATIENT)
Dept: MAMMOGRAPHY | Facility: CLINIC | Age: 83
Discharge: HOME | End: 2018-08-24
Attending: INTERNAL MEDICINE | Admitting: INTERNAL MEDICINE
Payer: MEDICARE

## 2018-08-24 DIAGNOSIS — Z12.31 VISIT FOR SCREENING MAMMOGRAM: ICD-10-CM

## 2018-08-24 PROCEDURE — 77067 SCR MAMMO BI INCL CAD: CPT

## 2018-08-27 ENCOUNTER — APPOINTMENT (OUTPATIENT)
Dept: GENERAL RADIOLOGY | Facility: CLINIC | Age: 83
End: 2018-08-27
Attending: EMERGENCY MEDICINE
Payer: MEDICARE

## 2018-08-27 ENCOUNTER — APPOINTMENT (OUTPATIENT)
Dept: ULTRASOUND IMAGING | Facility: CLINIC | Age: 83
End: 2018-08-27
Attending: EMERGENCY MEDICINE
Payer: MEDICARE

## 2018-08-27 ENCOUNTER — HOSPITAL ENCOUNTER (EMERGENCY)
Facility: CLINIC | Age: 83
Discharge: HOME | End: 2018-08-27
Attending: EMERGENCY MEDICINE | Admitting: EMERGENCY MEDICINE
Payer: MEDICARE

## 2018-08-27 VITALS
WEIGHT: 162 LBS | BODY MASS INDEX: 25.76 KG/M2 | DIASTOLIC BLOOD PRESSURE: 76 MMHG | SYSTOLIC BLOOD PRESSURE: 122 MMHG | TEMPERATURE: 98 F | HEART RATE: 103 BPM | OXYGEN SATURATION: 95 %

## 2018-08-27 DIAGNOSIS — M71.21 SYNOVIAL CYST OF RIGHT POPLITEAL SPACE: ICD-10-CM

## 2018-08-27 PROCEDURE — 99284 EMERGENCY DEPT VISIT MOD MDM: CPT | Mod: 25

## 2018-08-27 PROCEDURE — 99284 EMERGENCY DEPT VISIT MOD MDM: CPT | Mod: Z6 | Performed by: EMERGENCY MEDICINE

## 2018-08-27 PROCEDURE — 73560 X-RAY EXAM OF KNEE 1 OR 2: CPT | Mod: RT

## 2018-08-27 PROCEDURE — 93971 EXTREMITY STUDY: CPT | Mod: RT

## 2018-08-27 NOTE — ED NOTES
Pt here with right knee pain that started 1 week ago. The pain is constant behind her right knee. Right knee is also swollen in comparison to the left.

## 2018-08-27 NOTE — ED AVS SNAPSHOT
[facility] Emergency Department    [address] [address]    Phone:  [phone]    Fax:  [phone]                                       Gabriela Agrawal   MRN: [id number]    Department:  [facility] Emergency Department   Date of Visit:  8/27/2018           Patient Information     Date Of Birth          8/8/1933        Your diagnoses for this visit were:     Synovial cyst of right popliteal space        You were seen by Moiz Fitzpatrick MD.        Discharge Instructions       Return to the emergency department if you have increased pain, redness, swelling of the knee, or other concerns.  Otherwise follow-up in orthopedic surgery clinic.  Continues his use acetaminophen and ibuprofen as well as ice for comfort.    Your next 10 appointments already scheduled     Feb 27, 2019 11:00 AM CST   [facility]gett [facility]   [facility]    [address] [address] [phone]              24 Hour Appointment Hotline       To make an appointment at any [facility], call [phone] ([phone]). If you don't have a family doctor or clinic, we will help you find one. [facility] are conveniently located to serve the needs of you and your family.          ED Discharge Orders     ORTHO  REFERRAL       [facility] is referring you to the Orthopedic  Services at [facility].       The  Representative will assist you in the coordination of your Orthopedic and Musculoskeletal Care as prescribed by your physician.    The  Representative will call you within 1 business day to help schedule your appointment, or you may contact the  Representative at:    All areas ~ [phone]     Type of Referral : Non Surgical       Timeframe requested: Routine    Coverage of these services is subject to the terms and limitations of  your health insurance plan.  Please call member services at your health plan with any benefit or coverage questions.      If X-rays, CT or MRI's have been performed, please contact the facility where they were done to arrange for , prior to your scheduled appointment.  Please bring this referral request to your appointment and present it to your specialist.                     Review of your medicines      Our records show that you are taking the medicines listed below. If these are incorrect, please call your family doctor or clinic.        Dose / Directions Last dose taken    aspirin 81 MG tablet   Dose:  1 tablet        Take 1 tablet by mouth daily.   Refills:  3        CALCIUM + D PO   Dose:  1 tablet        Take 1 tablet by mouth daily.   Refills:  0        cholecalciferol 1000 UNIT tablet   Commonly known as:  vitamin D3   Dose:  1 tablet   Quantity:  100 tablet        Take 1 tablet by mouth daily.   Refills:  12        fish oil-omega-3 fatty acids 1000 MG capsule   Dose:  1 g   Quantity:  180 capsule        Take 1 g by mouth 2 times daily.   Refills:  12        losartan 100 MG tablet   Commonly known as:  COZAAR   Dose:  100 mg   Quantity:  90 tablet        Take 1 tablet (100 mg) by mouth daily   Refills:  3        lovastatin 20 MG tablet   Commonly known as:  MEVACOR   Dose:  20 mg   Quantity:  90 tablet        Take 1 tablet (20 mg) by mouth At Bedtime   Refills:  3        MAG-200 PO   Dose:  1 tablet        Take 1 tablet by mouth daily.   Refills:  0        Multi-vitamin Tabs tablet   Generic drug:  multivitamin, therapeutic with minerals        1 TABLET DAILY   Refills:  0        OVER-THE-COUNTER        Tumeric-Curcumen 1 capsule daily , unknown dose   Refills:  0        VITAMIN B-12 PO   Dose:  1 tablet        Take 1 tablet by mouth daily   Refills:  0        VITAMIN C PO        1 TABLET DAILY   Refills:  0                Procedures and tests performed during your visit     US Lower Extremity  Venous Duplex Right    XR Knee Right 1/2 Views      Orders Needing Specimen Collection     None      Pending Results     Date and Time Order Name Status Description    8/27/2018 0945  Lower Extremity Venous Duplex Right Preliminary     8/27/2018 0945 XR Knee Right 1/2 Views Preliminary             Pending Culture Results     No orders found from 8/25/2018 to 8/28/2018.            Pending Results Instructions     If you had any lab results that were not finalized at the time of your Discharge, you can call the ED Lab Result RN at [phone]. You will be contacted by this team for any positive Lab results or changes in treatment. The nurses are available 7 days a week from 10A to 6:30P.  You can leave a message 24 hours per day and they will return your call.        Test Results From Your Hospital Stay        8/27/2018 10:26 AM      Narrative     RIGHT KNEE ONE TO TWO VIEWS  8/27/2018 10:02 AM     COMPARISON:  [facility] right knee 5/7/2013.    HISTORY:  Posterior knee pain, past week, no known injury.         Impression     IMPRESSION: Arthroplasty changes in the medial compartment of the  right knee again noted. Alignment between the arthroplasty components  remains normal. Mild degenerative changes in the lateral compartment  consisting of loss of joint space width and osteophytic spurring again  noted. Moderate osteophytic spurring in the patellofemoral compartment  again noted. No evidence for fracture, dislocation or hardware  failure.         8/27/2018 10:50 AM      Narrative     ULTRASOUND RIGHT LOWER EXTREMITY VENOUS DUPLEX August 27, 2018 10:35  AM     HISTORY: Right posterior leg pain.    TECHNIQUE: Spectral Doppler and waveform analysis were performed on  the right upper extremity veins.     COMPARISON: 7/27/2010.    FINDINGS: The right internal jugular, subclavian, axillary, and  brachial veins are patent and negative for deep venous thrombosis. The  right basilic, cephalic, radial, and ulnar veins also  "appear patent  and negative for thrombus. Cystic lesion in the right popliteal fossa  measures 3.5 x 2 x 2.7 cm, and likely represents a Baker's cyst. This  popliteal cystic lesion appears to correspond to the area of the  patient's pain. This finding has decreased in size since the previous  exam.        Impression     IMPRESSION:   1. No evidence for deep venous thrombosis in the right upper  extremity.   2. A probable right Baker's cyst measures 3.5 cm, and appears to  correspond to the area of the patient's pain.                Thank you for choosing [facility]       Thank you for choosing [facility] for your care. Our goal is always to provide you with excellent care. Hearing back from our patients is one way we can continue to improve our services. Please take a few minutes to complete the written survey that you may receive in the mail after you visit with us. Thank you!        [employer] Information     [employer] lets you send messages to your doctor, view your test results, renew your prescriptions, schedule appointments and more. To sign up, go to [email or web address] . Click on \"Log in\" on the left side of the screen, which will take you to the Welcome page. Then click on \"Sign up Now\" on the right side of the page.     You will be asked to enter the access code listed below, as well as some personal information. Please follow the directions to create your username and password.     Your access code is: [email or web address]  Expires: 2018 11:09 AM     Your access code will  in 90 days. If you need help or a new code, please call your [facility] clinic or [phone].        Care EveryWhere ID     This is your Care EveryWhere ID. This could be used by other organizations to access your [facility] medical records  [id number]        Equal Access to Services     YVONNE STRICKLAND : Miguel Godinez, edu hampton, willi villagomez. So robert " [phone].    ATENCIÓN: Si habla español, tiene a chavez disposición servicios gratuitos de asistencia lingüística. Llame al [phone].    We comply with applicable federal civil rights laws and [place] laws. We do not discriminate on the basis of race, color, national origin, age, disability, sex, sexual orientation, or gender identity.            After Visit Summary       This is your record. Keep this with you and show to your community pharmacist(s) and doctor(s) at your next visit.

## 2018-08-27 NOTE — ED PROVIDER NOTES
History     Chief Complaint   Patient presents with     Knee Pain     pain behind rt knee for past week,  worse with walking, no known injury,      HPI  Gabriela Agrawal is a 85 year old female who presents with right knee pain.  Symptoms have been present for the past week, she does not know of any injury.  Pain is located in the back of the knee, hurts to walk, or bear any weight, pain is moderate in severity.  She has been using acetaminophen and ice with minimal improvement.  She has noticed some swelling.  No rash, fever, body aches, nausea, vomiting, diarrhea, dysuria, abdominal pain.    Problem List:    Patient Active Problem List    Diagnosis Date Noted     Grief reaction 12/11/2015     Priority: Medium     Serum calcium elevated 10/07/2015     Priority: Medium     Osteopenia 10/06/2015     Priority: Medium     On calcium and vitamin D    Dexa 2015 - 1. The T-score of the lumbar spine in the region of L1-L4 is -0.5.  This correlates with normal bone mineral density. If one looks at the  L2 vertebral body alone the T score is -1.5 which correlates with  mild-moderate osteopenia.     2. The T-score of the right femoral neck is -2.4. This correlates with  severe osteopenia.     3. The T-score of the left femoral neck is -2.3. This correlates with  severe osteopenia.       Essential hypertension, benign 10/02/2015     Priority: Medium     Advanced directives, counseling/discussion 09/04/2014     Priority: Medium     Patient has on file        History of skin cancer 01/29/2013     Priority: Medium     Seborrheic keratosis 01/29/2013     Priority: Medium     Lentigo 01/29/2013     Priority: Medium     Angioma 01/29/2013     Priority: Medium     Dermal nevus 01/29/2013     Priority: Medium     AK (actinic keratosis) 01/29/2013     Priority: Medium     Hyperlipidemia LDL goal <130 10/17/2012     Priority: Medium     Basal cell carcinoma, lip 09/24/2012     Priority: Medium     Squamous cell carcinoma in situ  09/24/2012     Priority: Medium     History of melanoma 09/24/2012     Priority: Medium     Melanoma (H) 02/10/2011     Priority: Medium     Seeing oncologist         CARDIOVASCULAR SCREENING; LDL GOAL LESS THAN 160 10/31/2010     Priority: Medium        Past Medical History:    Past Medical History:   Diagnosis Date     Basal cell carcinoma      Bronchiolitis obliterans (H)      Diffuse cystic mastopathy      Disorder of bone and cartilage, unspecified      Malignant melanoma (H)      Nonspecific (abnormal) findings on radiological and other examination of lung field      Other abnormal blood chemistry      Other specified alveolar and parietoalveolar pneumonopathies      Squamous cell carcinoma      Syncope and collapse        Past Surgical History:    Past Surgical History:   Procedure Laterality Date     APPENDECTOMY OPEN       BREAST SURGERY      biopsy     COLONOSCOPY  2/8/2013    Procedure: COLONOSCOPY;  Colonoscopy  ;  Surgeon: Norah Whaley MD;  Location: [facility]     EYE SURGERY      keratoplasty     HERNIA REPAIR      left inginal     HYSTERECTOMY      including ovaries     KNEE SURGERY      2010, 2009     LUNG SURGERY      biopsy     melanoma  resection         Family History:    Family History   Problem Relation Age of Onset     Cancer Other      Family History Negative Other        Social History:  Marital Status:   [5]  Social History   Substance Use Topics     Smoking status: Former Smoker     Packs/day: 1.50     Years: 22.50     Quit date: 1/1/1982     Smokeless tobacco: Never Used     Alcohol use Yes      Comment: 4-6 a week        Medications:      aspirin 81 MG tablet   Calcium Carbonate-Vitamin D (CALCIUM + D PO)   cholecalciferol (VITAMIN D) 1000 UNIT tablet   Cyanocobalamin (VITAMIN B-12 PO)   fish oil-omega-3 fatty acids 1000 MG capsule   losartan (COZAAR) 100 MG tablet   lovastatin (MEVACOR) 20 MG tablet   Magnesium Oxide, 5504614599, (MAG-200 PO)   MULTI-VITAMIN OR TABS    OVER-THE-COUNTER   VITAMIN C OR         Review of Systems  A 4 point review of systems was performed. All pertinent positives and negatives were listed in the HPI and rest of ROS were otherwise negative.    Physical Exam   BP: 122/76  Pulse: 103  Temp: 98  F (36.7  C)  Weight: 73.5 kg (162 lb)  SpO2: 95 %      Physical Exam   Constitutional: She is oriented to person, place, and time. She appears well-developed and well-nourished. No distress.   HENT:   Head: Normocephalic and atraumatic.   Eyes: No scleral icterus.   Neck: Normal range of motion. Neck supple.   Cardiovascular:   Pulses:       Dorsalis pedis pulses are 2+ on the right side        Posterior tibial pulses are 2+ on the right side   Musculoskeletal:   Right Hip: no deformity, erythema, or warmth appreciated; no tenderness over greater trochanter or inguinal region; full ROM.  Right Knee: mild effusion; no deformity, erythema or warmth appreciated; no tenderness over patella, joint line, or femoral condyles; full ROM.   Neurological: She is alert and oriented to person, place, and time.   Skin: Skin is warm and dry. No rash noted. She is not diaphoretic. No erythema. No pallor.       ED Course     ED Course     Procedures               Critical Care time:  none               Results for orders placed or performed during the hospital encounter of 08/27/18 (from the past 24 hour(s))   XR Knee Right 1/2 Views    Narrative    RIGHT KNEE ONE TO TWO VIEWS  8/27/2018 10:02 AM     COMPARISON:  Two-view right knee 5/7/2013.    HISTORY:  Posterior knee pain, past week, no known injury.       Impression    IMPRESSION: Arthroplasty changes in the medial compartment of the  right knee again noted. Alignment between the arthroplasty components  remains normal. Mild degenerative changes in the lateral compartment  consisting of loss of joint space width and osteophytic spurring again  noted. Moderate osteophytic spurring in the patellofemoral compartment  again  noted. No evidence for fracture, dislocation or hardware  failure.   US Lower Extremity Venous Duplex Right    Narrative    ULTRASOUND RIGHT LOWER EXTREMITY VENOUS DUPLEX August 27, 2018 10:35  AM     HISTORY: Right posterior leg pain.    TECHNIQUE: Spectral Doppler and waveform analysis were performed on  the right upper extremity veins.     COMPARISON: 7/27/2010.    FINDINGS: The right internal jugular, subclavian, axillary, and  brachial veins are patent and negative for deep venous thrombosis. The  right basilic, cephalic, radial, and ulnar veins also appear patent  and negative for thrombus. Cystic lesion in the right popliteal fossa  measures 3.5 x 2 x 2.7 cm, and likely represents a Baker's cyst. This  popliteal cystic lesion appears to correspond to the area of the  patient's pain. This finding has decreased in size since the previous  exam.      Impression    IMPRESSION:   1. No evidence for deep venous thrombosis in the right upper  extremity.   2. A probable right Baker's cyst measures 3.5 cm, and appears to  correspond to the area of the patient's pain.       Medications - No data to display    Assessments & Plan (with Medical Decision Making)   85-year-old female presents with right knee pain.  Temperature is 36.7 C, heart 103, SPO2 is 95% on room air.  The knee is slightly swollen but there is no erythema or increased warmth, no signs of septic arthritis.  She has normal range of motion however this is uncomfortable for her.  No signs of cellulitis or abscess on exam.  She has good pulses in the foot, no signs of arterial injury.  X-ray of the knee and ultrasound of the lower extremity obtained, images reviewed independently as well as radiology read reviewed, no signs of fracture or dislocation, no signs of DVT.  The ultrasound is positive for Baker's cyst.  We discussed arthrocentesis with steroid injection for symptomatic treatment versus a wait-and-see approach.  I offered arthrocentesis today but  the patient declined stating that she would wait-and-see.  I have told her to return if she has signs of infection, otherwise follow-up in orthopedic surgery clinic for a recheck.  The patient is in agreement with this plan    I have reviewed the nursing notes.    I have reviewed the findings, diagnosis, plan and need for follow up with the patient.       New Prescriptions    No medications on file       Final diagnoses:   Synovial cyst of right popliteal space       8/27/2018   [facility] EMERGENCY DEPARTMENT     Moiz Fitzpatrick MD  08/27/18 [id number]

## 2018-08-27 NOTE — DISCHARGE INSTRUCTIONS
Return to the emergency department if you have increased pain, redness, swelling of the knee, or other concerns.  Otherwise follow-up in orthopedic surgery clinic.  Continues his use acetaminophen and ibuprofen as well as ice for comfort.

## 2018-08-29 ENCOUNTER — HOSPITAL ENCOUNTER (EMERGENCY)
Facility: CLINIC | Age: 83
Discharge: HOME | End: 2018-08-29
Attending: EMERGENCY MEDICINE | Admitting: EMERGENCY MEDICINE
Payer: MEDICARE

## 2018-08-29 VITALS
DIASTOLIC BLOOD PRESSURE: 80 MMHG | OXYGEN SATURATION: 99 % | HEIGHT: 68 IN | RESPIRATION RATE: 16 BRPM | WEIGHT: 162 LBS | TEMPERATURE: 98.2 F | BODY MASS INDEX: 24.55 KG/M2 | SYSTOLIC BLOOD PRESSURE: 146 MMHG

## 2018-08-29 DIAGNOSIS — M71.21 SYNOVIAL CYST OF RIGHT POPLITEAL SPACE: ICD-10-CM

## 2018-08-29 DIAGNOSIS — M25.561 ACUTE PAIN OF RIGHT KNEE: ICD-10-CM

## 2018-08-29 PROCEDURE — 99284 EMERGENCY DEPT VISIT MOD MDM: CPT | Mod: 25 | Performed by: EMERGENCY MEDICINE

## 2018-08-29 PROCEDURE — 20610 DRAIN/INJ JOINT/BURSA W/O US: CPT | Performed by: EMERGENCY MEDICINE

## 2018-08-29 PROCEDURE — 20610 DRAIN/INJ JOINT/BURSA W/O US: CPT | Mod: Z6 | Performed by: EMERGENCY MEDICINE

## 2018-08-29 RX ORDER — LIDOCAINE HYDROCHLORIDE 10 MG/ML
10 INJECTION, SOLUTION EPIDURAL; INFILTRATION; INTRACAUDAL; PERINEURAL ONCE
Status: DISCONTINUED | OUTPATIENT
Start: 2018-08-29 | End: 2018-08-29 | Stop reason: HOSPADM

## 2018-08-29 RX ORDER — OXYCODONE HYDROCHLORIDE 5 MG/1
5 TABLET ORAL EVERY 6 HOURS PRN
Qty: 10 TABLET | Refills: 0 | Status: SHIPPED | OUTPATIENT
Start: 2018-08-29 | End: 2018-12-19

## 2018-08-29 RX ORDER — TRIAMCINOLONE ACETONIDE 40 MG/ML
40 INJECTION, SUSPENSION INTRA-ARTICULAR; INTRAMUSCULAR ONCE
Status: DISCONTINUED | OUTPATIENT
Start: 2018-08-29 | End: 2018-08-29 | Stop reason: HOSPADM

## 2018-08-29 NOTE — DISCHARGE INSTRUCTIONS
Return to the emergency department if you have worsening symptoms, repeated vomiting, redness or swelling of the joint, or other concerns.  Otherwise follow-up in orthopedic surgery clinic.    Use ibuprofen and acetaminophen for your symptoms.  Use oxycodone as needed for pain that is not controlled by the prior two medications.    Oxycodone is an addictive opioid medication, please only take it when the pain is more than can be controlled by acetaminophen or ibuprofen alone. It will also make you lightheaded, nauseated, and constipated.  Do not drive, operate heavy machinery, or take care of young children while taking this medication.     Repeated studies have shown that the longer you use opioid pain medications, the longer it is until you return to normal function. It is our recommendation that you taper off opioids as quickly as possible with the goal of returning to normal function or near normal function. Long term use of opioids quickly results in growing tolerance to the medication (less of the benefits) and increased dependence (more of the bad side effects).     Pain is very difficult to treat and we can very rarely take away pain completely. If you are having difficulty with pain over several weeks after an injury, you may need to start different medications and therapies (such as physical therapy, graded exercise, massage, and acupuncture). Please talk about this with your regular doctor.     If you are interested in seeking free, confidential treatment referral and information service for individuals and families facing mental health and/or substance use disorders please call [phone] ([phone])

## 2018-08-29 NOTE — ED AVS SNAPSHOT
[facility] Emergency Department    [address] [address]    Phone:  [phone]    Fax:  [phone]                                       Gabriela Agrawal   MRN: [id number]    Department:  [facility] Emergency Department   Date of Visit:  8/29/2018           After Visit Summary Signature Page     I have received my discharge instructions, and my questions have been answered. I have discussed any challenges I see with this plan with the nurse or doctor.    ..........................................................................................................................................  Patient/Patient Representative Signature      ..........................................................................................................................................  Patient Representative Print Name and Relationship to Patient    ..................................................               ................................................  Date                                            Time    ..........................................................................................................................................  Reviewed by Signature/Title    ...................................................              ..............................................  Date                                                            Time          22EPIC Rev 08/18

## 2018-08-29 NOTE — ED NOTES
Swelling of the right knee since last week. Was diagnosed with a bakers cyst 2 days ago here in ED. Ortho called to make a follow up apt with her, pt deferred, stating she wanted to wait and see if it would get better. CMS intact. Pt has a cold sensation in the knee area. Pt has been trying ice and tylenol with some relief. Pt is able ambulate with discomfort. No fevers.

## 2018-08-29 NOTE — ED PROVIDER NOTES
History     Chief Complaint   Patient presents with     Knee Pain     seen on monday for knee pain dx with bakers cyst. has not been able to follow upwith ortho. pain has increased     HPI  Gabriela Agrawal is a 85 year old female who presents for right knee pain.  History obtained from the patient, her son, and review of the chart.  The patient has had increasing knee pain and swelling for the past several days.  I saw this patient in the emergency department 2 days ago and imaging showed Baker's cyst.  I offered arthrocentesis for symptom control with the patient declined.  She returns today due to continued pain and difficulty walking and is asking for arthrocentesis.  Pain is aching and sharp, moderate to severe.  She has been using ibuprofen and acetaminophen as well as ice without improvement.  She denies fever, chills, rash, abdominal pain, nausea, vomiting.  No new injuries.    Problem List:    Patient Active Problem List    Diagnosis Date Noted     Grief reaction 12/11/2015     Priority: Medium     Serum calcium elevated 10/07/2015     Priority: Medium     Osteopenia 10/06/2015     Priority: Medium     On calcium and vitamin D    Dexa 2015 - 1. The T-score of the lumbar spine in the region of L1-L4 is -0.5.  This correlates with normal bone mineral density. If one looks at the  L2 vertebral body alone the T score is -1.5 which correlates with  mild-moderate osteopenia.     2. The T-score of the right femoral neck is -2.4. This correlates with  severe osteopenia.     3. The T-score of the left femoral neck is -2.3. This correlates with  severe osteopenia.       Essential hypertension, benign 10/02/2015     Priority: Medium     Advanced directives, counseling/discussion 09/04/2014     Priority: Medium     Patient has on file        History of skin cancer 01/29/2013     Priority: Medium     Seborrheic keratosis 01/29/2013     Priority: Medium     Lentigo 01/29/2013     Priority: Medium     Angioma 01/29/2013      Priority: Medium     Dermal nevus 01/29/2013     Priority: Medium     AK (actinic keratosis) 01/29/2013     Priority: Medium     Hyperlipidemia LDL goal <130 10/17/2012     Priority: Medium     Basal cell carcinoma, lip 09/24/2012     Priority: Medium     Squamous cell carcinoma in situ 09/24/2012     Priority: Medium     History of melanoma 09/24/2012     Priority: Medium     Melanoma (H) 02/10/2011     Priority: Medium     Seeing oncologist         CARDIOVASCULAR SCREENING; LDL GOAL LESS THAN 160 10/31/2010     Priority: Medium        Past Medical History:    Past Medical History:   Diagnosis Date     Basal cell carcinoma      Bronchiolitis obliterans (H)      Diffuse cystic mastopathy      Disorder of bone and cartilage, unspecified      Malignant melanoma (H)      Nonspecific (abnormal) findings on radiological and other examination of lung field      Other abnormal blood chemistry      Other specified alveolar and parietoalveolar pneumonopathies      Squamous cell carcinoma      Syncope and collapse        Past Surgical History:    Past Surgical History:   Procedure Laterality Date     APPENDECTOMY OPEN       BREAST SURGERY      biopsy     COLONOSCOPY  2/8/2013    Procedure: COLONOSCOPY;  Colonoscopy  ;  Surgeon: Norah Whaley MD;  Location: [facility]     EYE SURGERY      keratoplasty     HERNIA REPAIR      left inginal     HYSTERECTOMY      including ovaries     KNEE SURGERY      2010, 2009     LUNG SURGERY      biopsy     melanoma  resection         Family History:    Family History   Problem Relation Age of Onset     Cancer Other      Family History Negative Other        Social History:  Marital Status:   [5]  Social History   Substance Use Topics     Smoking status: Former Smoker     Packs/day: 1.50     Years: 22.50     Quit date: 1/1/1982     Smokeless tobacco: Never Used     Alcohol use Yes      Comment: 4-6 a week        Medications:      aspirin 81 MG tablet   Calcium Carbonate-Vitamin  "D (CALCIUM + D PO)   cholecalciferol (VITAMIN D) 1000 UNIT tablet   Cyanocobalamin (VITAMIN B-12 PO)   fish oil-omega-3 fatty acids 1000 MG capsule   losartan (COZAAR) 100 MG tablet   lovastatin (MEVACOR) 20 MG tablet   Magnesium Oxide, [id number], (MAG-200 PO)   MULTI-VITAMIN OR TABS   OVER-THE-COUNTER   oxyCODONE IR (ROXICODONE) 5 MG tablet   VITAMIN C OR         Review of Systems  A 4 point review of systems was performed. All pertinent positives and negatives were listed in the HPI and rest of ROS were otherwise negative.    Physical Exam   BP: 146/80  Heart Rate: 93  Temp: 98.2  F (36.8  C)  Resp: 16  Height: 172.7 cm (5' 8\")  Weight: 73.5 kg (162 lb)  SpO2: 93 %      Physical Exam   Constitutional: She is oriented to person, place, and time. She appears well-developed and well-nourished. No distress.   HENT:   Head: Normocephalic and atraumatic.   Eyes: No scleral icterus.   Neck: Normal range of motion. Neck supple.   Musculoskeletal:   Right Knee: effusion but no deformity, erythema or warmth appreciated; no tenderness over patella, joint line, or femoral condyles; ROM limited by pain   Neurological: She is alert and oriented to person, place, and time.   Skin: Skin is warm and dry. No rash noted. She is not diaphoretic. No erythema. No pallor.       ED Course     ED Course     Arthrocentesis  Date/Time: 8/29/2018 5:38 PM  Performed by: JAMMIE AVILA  Authorized by: JAMMIE AVILA   Consent: Verbal consent obtained. Written consent obtained.  Consent given by: patient  Time out: Immediately prior to procedure a \"time out\" was called to verify the correct patient, procedure, equipment, support staff and site/side marked as required.  Indications: joint swelling and pain   Body area: knee  Joint: right knee  Local anesthesia used: yes    Anesthesia:  Local anesthesia used: yes  Local Anesthetic: lidocaine 1% without epinephrine  Preparation: Patient was prepped and draped in the usual sterile " fashion.  Needle size: 22 G  Approach: lateral  Aspirate: blood-tinged  Aspirate amount: 20 mL  Triamcinolone amount: 40 mg  Lidocaine 1% amount: 1 mL  Patient tolerance: Patient tolerated the procedure well with no immediate complications                     Critical Care time:  none               No results found for this or any previous visit (from the past 24 hour(s)).    Medications   triamcinolone acetonide (KENALOG-40) injection 40 mg (not administered)   lidocaine (PF) (XYLOCAINE) 1 % injection 10 mL (not administered)       Assessments & Plan (with Medical Decision Making)   85-year-old female who presents with right knee pain and swelling.  Known Baker's cyst.  No signs of septic arthritis or cellulitis.  Temperature 36.8 C, heart rate 93, SPO2 is 93% on room air.  Arthrocentesis performed as above with injection of triamcinolone and lidocaine into the joint.  The patient tolerated procedure well and afterwards was feeling better.  She is safe to discharge with instructions to follow-up in orthopedic surgery clinic, use ice, acetaminophen, ibuprofen, and a short course of oxycodone for the pain, return if there are signs of infection.  The patient is in agreement with this plan.    I have reviewed the nursing notes.    I have reviewed the findings, diagnosis, plan and need for follow up with the patient.       New Prescriptions    OXYCODONE IR (ROXICODONE) 5 MG TABLET    Take 1 tablet (5 mg) by mouth every 6 hours as needed for pain       Final diagnoses:   Acute pain of right knee   Synovial cyst of right popliteal space       8/29/2018   [facility] EMERGENCY DEPARTMENT     Moiz Fitzpatrick MD  08/29/18 [id number]

## 2018-08-29 NOTE — ED AVS SNAPSHOT
[facility] Emergency Department    [address] [address]    Phone:  [phone]    Fax:  [phone]                                       Gabriela Agrawal   MRN: [id number]    Department:  [facility] Emergency Department   Date of Visit:  8/29/2018           Patient Information     Date Of Birth          8/8/1933        Your diagnoses for this visit were:     Acute pain of right knee     Synovial cyst of right popliteal space        You were seen by Moiz Fitzpatrick MD.        Discharge Instructions       Return to the emergency department if you have worsening symptoms, repeated vomiting, redness or swelling of the joint, or other concerns.  Otherwise follow-up in orthopedic surgery clinic.    Use ibuprofen and acetaminophen for your symptoms.  Use oxycodone as needed for pain that is not controlled by the prior two medications.    Oxycodone is an addictive opioid medication, please only take it when the pain is more than can be controlled by acetaminophen or ibuprofen alone. It will also make you lightheaded, nauseated, and constipated.  Do not drive, operate heavy machinery, or take care of young children while taking this medication.     Repeated studies have shown that the longer you use opioid pain medications, the longer it is until you return to normal function. It is our recommendation that you taper off opioids as quickly as possible with the goal of returning to normal function or near normal function. Long term use of opioids quickly results in growing tolerance to the medication (less of the benefits) and increased dependence (more of the bad side effects).     Pain is very difficult to treat and we can very rarely take away pain completely. If you are having difficulty with pain over several weeks after an injury, you may need to start different medications and therapies (such as physical therapy, graded exercise, massage, and acupuncture). Please talk about this  with your regular doctor.     If you are interested in seeking free, confidential treatment referral and information service for individuals and families facing mental health and/or substance use disorders please call [phone])     Your next 10 appointments already scheduled     Feb 27, 2019 11:00 AM CST   [facility] Wade Padgett [facility]   [facility]    [address] [address] [phone]              24 Hour Appointment Hotline       To make an appointment at any [facility], call [phone] ([phone]). If you don't have a family doctor or clinic, we will help you find one. [facility] are conveniently located to serve the needs of you and your family.             Review of your medicines      START taking        Dose / Directions Last dose taken    oxyCODONE IR 5 MG tablet   Commonly known as:  ROXICODONE   Dose:  5 mg   Quantity:  10 tablet        Take 1 tablet (5 mg) by mouth every 6 hours as needed for pain   Refills:  0          Our records show that you are taking the medicines listed below. If these are incorrect, please call your family doctor or clinic.        Dose / Directions Last dose taken    aspirin 81 MG tablet   Dose:  1 tablet        Take 1 tablet by mouth daily.   Refills:  3        CALCIUM + D PO   Dose:  1 tablet        Take 1 tablet by mouth daily.   Refills:  0        cholecalciferol 1000 UNIT tablet   Commonly known as:  vitamin D3   Dose:  1 tablet   Quantity:  100 tablet        Take 1 tablet by mouth daily.   Refills:  12        fish oil-omega-3 fatty acids 1000 MG capsule   Dose:  1 g   Quantity:  180 capsule        Take 1 g by mouth 2 times daily.   Refills:  12        losartan 100 MG tablet   Commonly known as:  COZAAR   Dose:  100 mg   Quantity:  90 tablet        Take 1 tablet (100 mg) by mouth daily   Refills:  3        lovastatin 20 MG tablet   Commonly known as:  MEVACOR    Dose:  20 mg   Quantity:  90 tablet        Take 1 tablet (20 mg) by mouth At Bedtime   Refills:  3        MAG-200 PO   Dose:  1 tablet        Take 1 tablet by mouth daily.   Refills:  0        Multi-vitamin Tabs tablet   Generic drug:  multivitamin, therapeutic with minerals        1 TABLET DAILY   Refills:  0        OVER-THE-COUNTER        Tumeric-Curcumen 1 capsule daily , unknown dose   Refills:  0        VITAMIN B-12 PO   Dose:  1 tablet        Take 1 tablet by mouth daily   Refills:  0        VITAMIN C PO        1 TABLET DAILY   Refills:  0                Information about OPIOIDS     PRESCRIPTION OPIOIDS: WHAT YOU NEED TO KNOW   We gave you an opioid (narcotic) pain medicine. It is important to manage your pain, but opioids are not always the best choice. You should first try all the other options your care team gave you. Take this medicine for as short a time (and as few doses) as possible.    Some activities can increase your pain, such as bandage changes or therapy sessions. It may help to take your pain medicine 30 to 60 minutes before these activities. Reduce your stress by getting enough sleep, working on hobbies you enjoy and practicing relaxation or meditation. Talk to your care team about ways to manage your pain beyond prescription opioids.    These medicines have risks:    DO NOT drive when on new or higher doses of pain medicine. These medicines can affect your alertness and reaction times, and you could be arrested for driving under the influence (DUI). If you need to use opioids long-term, talk to your care team about driving.    DO NOT operate heavy machinery    DO NOT do any other dangerous activities while taking these medicines.    DO NOT drink any alcohol while taking these medicines.     If the opioid prescribed includes acetaminophen, DO NOT take with any other medicines that contain acetaminophen. Read all labels carefully. Look for the word  acetaminophen  or  Tylenol.  Ask your  pharmacist if you have questions or are unsure.    You can get addicted to pain medicines, especially if you have a history of addiction (chemical, alcohol or substance dependence). Talk to your care team about ways to reduce this risk.    All opioids tend to cause constipation. Drink plenty of water and eat foods that have a lot of fiber, such as fruits, vegetables, prune juice, apple juice and high-fiber cereal. Take a laxative (Miralax, milk of magnesia, Colace, Senna) if you don t move your bowels at least every other day. Other side effects include upset stomach, sleepiness, dizziness, throwing up, tolerance (needing more of the medicine to have the same effect), physical dependence and slowed breathing.    Store your pills in a secure place, locked if possible. We will not replace any lost or stolen medicine. If you don t finish your medicine, please throw away (dispose) as directed by your pharmacist. The [place] [employer]ution [employer] has more information about safe disposal: https://www.pca.[facility].[place].us/living-green/managing-unwanted-medications        Prescriptions were sent or printed at these locations (1 Prescription)                   [facility] [place], [place] - [address]   [address] [address]    Telephone:  [phone]   Fax:  [phone]   Hours:                  Printed at Department/Unit printer (1 of 1)         oxyCODONE IR (ROXICODONE) 5 MG tablet                Orders Needing Specimen Collection     None      Pending Results     No orders found from 8/27/2018 to 8/30/2018.            Pending Culture Results     No orders found from 8/27/2018 to 8/30/2018.            Pending Results Instructions     If you had any lab results that were not finalized at the time of your Discharge, you can call the ED Lab Result RN at [phone]. You will be contacted by this team for any positive Lab results or changes in treatment. The  "nurses are available 7 days a week from 10A to 6:30P.  You can leave a message 24 hours per day and they will return your call.        Test Results From Your Hospital Stay               Thank you for choosing [facility]       Thank you for choosing [facility] for your care. Our goal is always to provide you with excellent care. Hearing back from our patients is one way we can continue to improve our services. Please take a few minutes to complete the written survey that you may receive in the mail after you visit with us. Thank you!        [employer] Information     [employer] lets you send messages to your doctor, view your test results, renew your prescriptions, schedule appointments and more. To sign up, go to [email or web address] . Click on \"Log in\" on the left side of the screen, which will take you to the Welcome page. Then click on \"Sign up Now\" on the right side of the page.     You will be asked to enter the access code listed below, as well as some personal information. Please follow the directions to create your username and password.     Your access code is: [email or web address]  Expires: 2018 11:09 AM     Your access code will  in 90 days. If you need help or a new code, please call your [facility] clinic or [phone].        Care EveryWhere ID     This is your Care EveryWhere ID. This could be used by other organizations to access your [facility] medical records  [id number]        Equal Access to Services     [facility] : Hadii aric hong Sosolange, waaxda ludemetrice, qaybta kaalmada jessi, willi renee . So [facility] [phone].    ATENCIÓN: Si habla español, tiene a chavez disposición servicios gratuitos de asistencia lingüística. Llame al [phone].    We comply with applicable federal civil rights laws and [place] laws. We do not discriminate on the basis of race, color, national origin, age, disability, sex, sexual orientation, or gender identity.            After " Visit Summary       This is your record. Keep this with you and show to your community pharmacist(s) and doctor(s) at your next visit.

## 2018-09-05 ENCOUNTER — RADIANT APPOINTMENT (OUTPATIENT)
Dept: GENERAL RADIOLOGY | Facility: CLINIC | Age: 83
End: 2018-09-05
Attending: PEDIATRICS
Payer: MEDICARE

## 2018-09-05 ENCOUNTER — OFFICE VISIT (OUTPATIENT)
Dept: ORTHOPEDICS | Facility: CLINIC | Age: 83
End: 2018-09-05
Payer: MEDICARE

## 2018-09-05 VITALS
SYSTOLIC BLOOD PRESSURE: 138 MMHG | DIASTOLIC BLOOD PRESSURE: 89 MMHG | WEIGHT: 162 LBS | HEIGHT: 68 IN | BODY MASS INDEX: 24.55 KG/M2

## 2018-09-05 DIAGNOSIS — M25.469 EFFUSION OF LOWER LEG JOINT: ICD-10-CM

## 2018-09-05 DIAGNOSIS — M25.562 BILATERAL KNEE PAIN: ICD-10-CM

## 2018-09-05 DIAGNOSIS — M25.561 ACUTE PAIN OF BOTH KNEES: ICD-10-CM

## 2018-09-05 DIAGNOSIS — Z96.653 STATUS POST BILATERAL KNEE REPLACEMENTS: ICD-10-CM

## 2018-09-05 DIAGNOSIS — M25.561 ACUTE PAIN OF BOTH KNEES: Primary | ICD-10-CM

## 2018-09-05 DIAGNOSIS — M25.561 BILATERAL KNEE PAIN: ICD-10-CM

## 2018-09-05 DIAGNOSIS — M25.562 ACUTE PAIN OF BOTH KNEES: ICD-10-CM

## 2018-09-05 DIAGNOSIS — M25.562 ACUTE PAIN OF BOTH KNEES: Primary | ICD-10-CM

## 2018-09-05 LAB
BASOPHILS # BLD AUTO: 0.1 10E9/L (ref 0–0.2)
BASOPHILS NFR BLD AUTO: 0.9 %
CRP SERPL-MCNC: 66.5 MG/L (ref 0–8)
DIFFERENTIAL METHOD BLD: ABNORMAL
EOSINOPHIL # BLD AUTO: 1.1 10E9/L (ref 0–0.7)
EOSINOPHIL NFR BLD AUTO: 7.7 %
ERYTHROCYTE [DISTWIDTH] IN BLOOD BY AUTOMATED COUNT: 13.2 % (ref 10–15)
ERYTHROCYTE [SEDIMENTATION RATE] IN BLOOD BY WESTERGREN METHOD: 71 MM/H (ref 0–30)
HCT VFR BLD AUTO: 44.6 % (ref 35–47)
HGB BLD-MCNC: 13.9 G/DL (ref 11.7–15.7)
IMM GRANULOCYTES # BLD: 0.1 10E9/L (ref 0–0.4)
IMM GRANULOCYTES NFR BLD: 0.5 %
LYMPHOCYTES # BLD AUTO: 2 10E9/L (ref 0.8–5.3)
LYMPHOCYTES NFR BLD AUTO: 14.6 %
MCH RBC QN AUTO: 28 PG (ref 26.5–33)
MCHC RBC AUTO-ENTMCNC: 31.2 G/DL (ref 31.5–36.5)
MCV RBC AUTO: 90 FL (ref 78–100)
MONOCYTES # BLD AUTO: 0.9 10E9/L (ref 0–1.3)
MONOCYTES NFR BLD AUTO: 6.6 %
NEUTROPHILS # BLD AUTO: 9.6 10E9/L (ref 1.6–8.3)
NEUTROPHILS NFR BLD AUTO: 69.7 %
NRBC # BLD AUTO: 0 10*3/UL
NRBC BLD AUTO-RTO: 0 /100
PLATELET # BLD AUTO: 587 10E9/L (ref 150–450)
RBC # BLD AUTO: 4.96 10E12/L (ref 3.8–5.2)
WBC # BLD AUTO: 13.7 10E9/L (ref 4–11)

## 2018-09-05 PROCEDURE — 85025 COMPLETE CBC W/AUTO DIFF WBC: CPT | Performed by: PEDIATRICS

## 2018-09-05 PROCEDURE — 99204 OFFICE O/P NEW MOD 45 MIN: CPT | Performed by: PEDIATRICS

## 2018-09-05 PROCEDURE — 85652 RBC SED RATE AUTOMATED: CPT | Performed by: PEDIATRICS

## 2018-09-05 PROCEDURE — 36415 COLL VENOUS BLD VENIPUNCTURE: CPT | Performed by: PEDIATRICS

## 2018-09-05 PROCEDURE — 73564 X-RAY EXAM KNEE 4 OR MORE: CPT | Mod: LT

## 2018-09-05 PROCEDURE — 86140 C-REACTIVE PROTEIN: CPT | Performed by: PEDIATRICS

## 2018-09-05 NOTE — MR AVS SNAPSHOT
After Visit Summary   9/5/2018    Gabriela Agrawal    MRN: [id number]           Patient Information     Date Of Birth          8/8/1933        Visit Information        Provider Department      9/5/2018 11:00 AM Cira Jennings MD [facility]        Today's Diagnoses     Acute pain of both knees    -  1    Status post bilateral knee replacements        Effusion of lower leg joint          Care Instructions        Plan:  - Today's Plan of Care:  Referral to an Orthopedic Surgeon  Inflammatory labs ordered  Continue with Rest, Ice, Compression, and Elevation.  Apply ice 10-15 minutes every 2-3 hours    Follow Up: will call with lab results    If you have any further questions for your physician or physician s care team you can call [phone] and use option 3 to leave a voice message. Calls received during business hours will be returned same day.              Follow-ups after your visit        Additional Services     ORTHO  REFERRAL       [facility] is referring you to the Orthopedic  Services at [facility].       The  Representative will assist you in the coordination of your Orthopedic and Musculoskeletal Care as prescribed by your physician.    The  Representative will call you within 1 business day to help schedule your appointment, or you may contact the  Representative at:    All areas ~ [phone]     Type of Referral : Surgical / Specialist       Timeframe requested: Routine    Coverage of these services is subject to the terms and limitations of your health insurance plan.  Please call member services at your health plan with any benefit or coverage questions.      If X-rays, CT or MRI's have been performed, please contact the facility where they were done to arrange for , prior to your scheduled appointment.  Please bring this referral request to your appointment and  "present it to your specialist.                  Your next 10 appointments already scheduled     Feb 27, 2019 11:00 AM CST   Return Visit with Wade Padgett MD   [facility] ([facility])    [address] [address]   [phone]              Future tests that were ordered for you today     Open Future Orders        Priority Expected Expires Ordered    CBC with platelets differential Routine  9/6/2019 9/5/2018    Erythrocyte sedimentation rate auto Routine  9/6/2019 9/5/2018    CRP inflammation Routine  9/6/2019 9/5/2018            Who to contact     If you have questions or need follow up information about today's clinic visit or your schedule please contact [facility] directly at [phone].  Normal or non-critical lab and imaging results will be communicated to you by MyChart, letter or phone within 4 business days after the clinic has received the results. If you do not hear from us within 7 days, please contact the clinic through MyChart or phone. If you have a critical or abnormal lab result, we will notify you by phone as soon as possible.  Submit refill requests through [employer] or call your pharmacy and they will forward the refill request to us. Please allow 3 business days for your refill to be completed.          Additional Information About Your Visit        [employer] Information     [employer] lets you send messages to your doctor, view your test results, renew your prescriptions, schedule appointments and more. To sign up, go to [email or web address] . Click on \"Log in\" on the left side of the screen, which will take you to the Welcome page. Then click on \"Sign up Now\" on the right side of the page.     You will be asked to enter the access code listed below, as well as some personal information. Please follow the directions to create your username and password.     Your access code is: [email or web address]  Expires: " "2018 11:09 AM     Your access code will  in 90 days. If you need help or a new code, please call your [facility] clinic or [phone].        Care EveryWhere ID     This is your Care EveryWhere ID. This could be used by other organizations to access your [facility] medical records  [id number]        Your Vitals Were     Height BMI (Body Mass Index)                5' 8\" (1.727 m) 24.63 kg/m2           Blood Pressure from Last 3 Encounters:   18 138/89   18 146/80   18 122/76    Weight from Last 3 Encounters:   18 162 lb (73.5 kg)   18 162 lb (73.5 kg)   18 162 lb (73.5 kg)              We Performed the Following     ORTHO  REFERRAL        Primary Care Provider Office Phone # Fax #    Coco Mejia,  [phone] [address] [address]        Equal Access to Services     YVONNE STRICKLAND : Hadii aric ku hadasho Soomaali, waaxda luqadaha, qaybta kaalmada adeegyavalerie, willi renee . So [facility] [phone].    ATENCIÓN: Si habla español, tiene a chavez disposición servicios gratuitos de asistencia lingüística. Llame al [phone].    We comply with applicable federal civil rights laws and [place] laws. We do not discriminate on the basis of race, color, national origin, age, disability, sex, sexual orientation, or gender identity.            Thank you!     Thank you for choosing [facility]  for your care. Our goal is always to provide you with excellent care. Hearing back from our patients is one way we can continue to improve our services. Please take a few minutes to complete the written survey that you may receive in the mail after your visit with us. Thank you!             Your Updated Medication List - Protect others around you: Learn how to safely use, store and throw away your medicines at www.disposemymeds.org.          This list is accurate as of 18 11:51 AM.  " Always use your most recent med list.                   Brand Name Dispense Instructions for use Diagnosis    aspirin 81 MG tablet      Take 1 tablet by mouth daily.    UTI (urinary tract infection), Hypertension, CARDIOVASCULAR SCREENING; LDL GOAL LESS THAN 160       CALCIUM + D PO      Take 1 tablet by mouth daily.        cholecalciferol 1000 UNIT tablet    vitamin D3    100 tablet    Take 1 tablet by mouth daily.    UTI (urinary tract infection), Hypertension, CARDIOVASCULAR SCREENING; LDL GOAL LESS THAN 160       fish oil-omega-3 fatty acids 1000 MG capsule     180 capsule    Take 1 g by mouth 2 times daily.    UTI (urinary tract infection), Hypertension, CARDIOVASCULAR SCREENING; LDL GOAL LESS THAN 160       losartan 100 MG tablet    COZAAR    90 tablet    Take 1 tablet (100 mg) by mouth daily    Essential hypertension, benign       lovastatin 20 MG tablet    MEVACOR    90 tablet    Take 1 tablet (20 mg) by mouth At Bedtime    Hyperlipidemia LDL goal <130       MAG-200 PO      Take 1 tablet by mouth daily.    Microscopic hematuria       Multi-vitamin Tabs tablet   Generic drug:  multivitamin, therapeutic with minerals      1 TABLET DAILY        OVER-THE-COUNTER      Tumeric-Curcumen 1 capsule daily , unknown dose        oxyCODONE IR 5 MG tablet    ROXICODONE    10 tablet    Take 1 tablet (5 mg) by mouth every 6 hours as needed for pain        VITAMIN B-12 PO      Take 1 tablet by mouth daily        VITAMIN C PO      1 TABLET DAILY

## 2018-09-05 NOTE — LETTER
9/5/2018         RE: Gabriela Agrawal  [address] [facility] [address]        Dear Colleague,    Thank you for referring your patient, Gabriela Agrawal, to the [facility]. Please see a copy of my visit note below.    Sports Medicine Clinic Visit    PCP: Coco Mejia    Gabriela Agrawal is a 85 year old female who is seen as an ER referral presenting with bilateral knee pain.    Injury: She has a history of partial knee replacements in the past and reports she was seen in the ED on 8/27/18 and 8/29/18 for right knee pain that has been present for 2 weeks. She denies any injury or overuse.  She reports pain started in right knee and she was diagnosed with a Baker's cyst in the ER.  She had an aspiration and injection which relieved her symptoms, now her left knee is bothering her.  She reports difficulty walking due to the pain. She denies any fevers, chills, rashes.    Location of Pain: bilateral posterior knee pain (L > R)  Duration of Pain: 2 week(s)  Rating of Pain at worst: 10/10  Rating of Pain Currently: 0/10  Symptoms are better with: Ibuprofen  Symptoms are worse with: standing, stairs and walking  Additional Features:   Positive: swelling, popping, instability and weakness   Negative: bruising, grinding, catching, locking, paresthesias and numbness  Other evaluation and/or treatments so far consists of: Ice, Ibuprofen and Rest  Prior History of related problems: partial knee replacement bilaterally    Social History: retired    Review of Systems  Skin: no bruising, yes swelling  Musculoskeletal: as above  Neurologic: no numbness, paresthesias  Remainder of review of systems is negative including constitutional, CV, pulmonary, GI, except as noted in HPI or medical history.    Patient's current problem list, past medical and surgical history, and family history were reviewed.    Patient Active Problem List   Diagnosis     CARDIOVASCULAR SCREENING; LDL GOAL  "LESS THAN 160     Melanoma (H)     Basal cell carcinoma, lip     Squamous cell carcinoma in situ     History of melanoma     Hyperlipidemia LDL goal <130     History of skin cancer     Seborrheic keratosis     Lentigo     Angioma     Dermal nevus     AK (actinic keratosis)     Advanced directives, counseling/discussion     Essential hypertension, benign     Osteopenia     Serum calcium elevated     Grief reaction     Past Medical History:   Diagnosis Date     Basal cell carcinoma      Bronchiolitis obliterans (H)      Diffuse cystic mastopathy     Bresat Fibrocystic Disease     Disorder of bone and cartilage, unspecified      Malignant melanoma (H)      Nonspecific (abnormal) findings on radiological and other examination of lung field      Other abnormal blood chemistry      Other specified alveolar and parietoalveolar pneumonopathies      Squamous cell carcinoma      Syncope and collapse     2007 holter neg     Past Surgical History:   Procedure Laterality Date     APPENDECTOMY OPEN       BREAST SURGERY      biopsy     COLONOSCOPY  2/8/2013    Procedure: COLONOSCOPY;  Colonoscopy  ;  Surgeon: Norah Whaley MD;  Location: [facility]     EYE SURGERY      keratoplasty     HERNIA REPAIR      left inginal     HYSTERECTOMY      including ovaries     KNEE SURGERY      2010, 2009     LUNG SURGERY      biopsy     melanoma  resection       Family History   Problem Relation Age of Onset     Cancer Other      Family History Negative Other          Objective  /89 (BP Location: Left arm, Patient Position: Chair, Cuff Size: Adult Regular)  Ht 5' 8\" (1.727 m)  Wt 162 lb (73.5 kg)  BMI 24.63 kg/m2    GENERAL APPEARANCE: healthy, alert and no distress   GAIT: antalgic  SKIN: no suspicious lesions or rashes  HEENT: Sclera clear, anicteric  CV: good peripheral pulses  RESP: Breathing not labored  NEURO: Normal strength and tone, mentation intact and speech normal  PSYCH:  mentation appears normal and affect " normal/bright    Bilateral Knee exam    Inspection:      moderate swelling left       Bilateral well healed scars    Patella:      Normal patellar tracking noted through range of motion bilateral    Tender:      lateral joint line bilateral    Non Tender:      remainder of knee area bilateral    Knee ROM:      Range of motion limited by pain and swelling on the left    Strength:      5/5 with knee extension bilateral    Special Tests:     neg (-) anterior drawer bilateral       neg (-) posterior drawer bilateral       neg (-) varus at 0 deg and 30 deg bilateral       neg (-) valgus at 0 deg and 30 deg bilateral    Gait:      antalgic gait    Neurovascular:      2+ peripheral pulses bilaterally and brisk capillary refill       sensation grossly intact    Radiology  I ordered, visualized and reviewed these images with the patient  3 XR views of bilateral reviewed: medial partial knee replacements in good alignment, degenerative changes in lateral and patellar compartments bilaterally  - will follow official read    I visualized and reviewed these images with the patient  Xr Knee Right 1/2 Views  Result Date: 8/27/2018  RIGHT KNEE ONE TO TWO VIEWS  8/27/2018 10:02 AM COMPARISON:  Two-view right knee 5/7/2013. HISTORY:  Posterior knee pain, past week, no known injury.   IMPRESSION: Arthroplasty changes in the medial compartment of the right knee again noted. Alignment between the arthroplasty components remains normal. Mild degenerative changes in the lateral compartment consisting of loss of joint space width and osteophytic spurring again noted. Moderate osteophytic spurring in the patellofemoral compartment again noted. No evidence for fracture, dislocation or hardware failure. PORTER BOWENS MD    Us Lower Extremity Venous Duplex Right  Result Date: 8/27/2018  ULTRASOUND RIGHT LOWER EXTREMITY VENOUS DUPLEX August 27, 2018 10:35 AM HISTORY: Right posterior leg pain. TECHNIQUE: Spectral Doppler and waveform analysis  were performed on the right upper extremity veins. COMPARISON: 7/27/2010. FINDINGS: The right internal jugular, subclavian, axillary, and brachial veins are patent and negative for deep venous thrombosis. The right basilic, cephalic, radial, and ulnar veins also appear patent and negative for thrombus. Cystic lesion in the right popliteal fossa measures 3.5 x 2 x 2.7 cm, and likely represents a Baker's cyst. This popliteal cystic lesion appears to correspond to the area of the patient's pain. This finding has decreased in size since the previous exam.   IMPRESSION: 1. No evidence for deep venous thrombosis in the right upper extremity. 2. A probable right Baker's cyst measures 3.5 cm, and appears to correspond to the area of the patient's pain. NE COOPER MD    Assessment:  1. Acute pain of both knees    2. Status post bilateral knee replacements    3. Effusion of lower leg joint      Knee pain and swelling s/p knee replacements with documented Baker's Cyst on right and relief with aspiration and injection.  Reviewed differential for symptoms post replacement including loosening of the prosthesis and possible infection. Irritation of underlying lateral compartment and patellofemoral arthritis also on the differential.  We'll start initial workup, and will likely have patient follow-up with orthopedic surgery. Recommend labs including CBC, ESR and CRP.    Plan:  - Today's Plan of Care:  Referral to an Orthopedic Surgeon  Inflammatory labs ordered  Continue with Rest, Ice, Compression, and Elevation.  Apply ice 10-15 minutes every 2-3 hours    Follow Up: will call with lab results    Concerning signs and symptoms were reviewed.  The patient expressed understanding of this management plan and all questions were answered at this time.    Cira Jennings MD CAQ  Primary Care Sports Medicine  [facility]    Again, thank you for allowing me to participate in the care of your patient.         Sincerely,        Cira Jennings MD

## 2018-09-05 NOTE — PROGRESS NOTES
Sports Medicine Clinic Visit    PCP: Coco Mejia    Gabriela Agrawal is a 85 year old female who is seen as an ER referral presenting with bilateral knee pain.    Injury: She has a history of partial knee replacements in the past and reports she was seen in the ED on 8/27/18 and 8/29/18 for right knee pain that has been present for 2 weeks. She denies any injury or overuse.  She reports pain started in right knee and she was diagnosed with a Baker's cyst in the ER.  She had an aspiration and injection which relieved her symptoms, now her left knee is bothering her.  She reports difficulty walking due to the pain. She denies any fevers, chills, rashes.    Location of Pain: bilateral posterior knee pain (L > R)  Duration of Pain: 2 week(s)  Rating of Pain at worst: 10/10  Rating of Pain Currently: 0/10  Symptoms are better with: Ibuprofen  Symptoms are worse with: standing, stairs and walking  Additional Features:   Positive: swelling, popping, instability and weakness   Negative: bruising, grinding, catching, locking, paresthesias and numbness  Other evaluation and/or treatments so far consists of: Ice, Ibuprofen and Rest  Prior History of related problems: partial knee replacement bilaterally    Social History: retired    Review of Systems  Skin: no bruising, yes swelling  Musculoskeletal: as above  Neurologic: no numbness, paresthesias  Remainder of review of systems is negative including constitutional, CV, pulmonary, GI, except as noted in HPI or medical history.    Patient's current problem list, past medical and surgical history, and family history were reviewed.    Patient Active Problem List   Diagnosis     CARDIOVASCULAR SCREENING; LDL GOAL LESS THAN 160     Melanoma (H)     Basal cell carcinoma, lip     Squamous cell carcinoma in situ     History of melanoma     Hyperlipidemia LDL goal <130     History of skin cancer     Seborrheic keratosis     Lentigo     Angioma     Dermal nevus     AK (actinic  "keratosis)     Advanced directives, counseling/discussion     Essential hypertension, benign     Osteopenia     Serum calcium elevated     Grief reaction     Past Medical History:   Diagnosis Date     Basal cell carcinoma      Bronchiolitis obliterans (H)      Diffuse cystic mastopathy     Bresat Fibrocystic Disease     Disorder of bone and cartilage, unspecified      Malignant melanoma (H)      Nonspecific (abnormal) findings on radiological and other examination of lung field      Other abnormal blood chemistry      Other specified alveolar and parietoalveolar pneumonopathies      Squamous cell carcinoma      Syncope and collapse     2007 holter neg     Past Surgical History:   Procedure Laterality Date     APPENDECTOMY OPEN       BREAST SURGERY      biopsy     COLONOSCOPY  2/8/2013    Procedure: COLONOSCOPY;  Colonoscopy  ;  Surgeon: Norah Whaley MD;  Location: [facility]     EYE SURGERY      keratoplasty     HERNIA REPAIR      left inginal     HYSTERECTOMY      including ovaries     KNEE SURGERY      2010, 2009     LUNG SURGERY      biopsy     melanoma  resection       Family History   Problem Relation Age of Onset     Cancer Other      Family History Negative Other          Objective  /89 (BP Location: Left arm, Patient Position: Chair, Cuff Size: Adult Regular)  Ht 5' 8\" (1.727 m)  Wt 162 lb (73.5 kg)  BMI 24.63 kg/m2    GENERAL APPEARANCE: healthy, alert and no distress   GAIT: antalgic  SKIN: no suspicious lesions or rashes  HEENT: Sclera clear, anicteric  CV: good peripheral pulses  RESP: Breathing not labored  NEURO: Normal strength and tone, mentation intact and speech normal  PSYCH:  mentation appears normal and affect normal/bright    Bilateral Knee exam    Inspection:      moderate swelling left       Bilateral well healed scars    Patella:      Normal patellar tracking noted through range of motion bilateral    Tender:      lateral joint line bilateral    Non Tender:      remainder " of knee area bilateral    Knee ROM:      Range of motion limited by pain and swelling on the left    Strength:      5/5 with knee extension bilateral    Special Tests:     neg (-) anterior drawer bilateral       neg (-) posterior drawer bilateral       neg (-) varus at 0 deg and 30 deg bilateral       neg (-) valgus at 0 deg and 30 deg bilateral    Gait:      antalgic gait    Neurovascular:      2+ peripheral pulses bilaterally and brisk capillary refill       sensation grossly intact    Radiology  I ordered, visualized and reviewed these images with the patient  3 XR views of bilateral reviewed: medial partial knee replacements in good alignment, degenerative changes in lateral and patellar compartments bilaterally  - will follow official read    I visualized and reviewed these images with the patient  Xr Knee Right 1/2 Views  Result Date: 8/27/2018  RIGHT KNEE ONE TO TWO VIEWS  8/27/2018 10:02 AM COMPARISON:  Two-view right knee 5/7/2013. HISTORY:  Posterior knee pain, past week, no known injury.   IMPRESSION: Arthroplasty changes in the medial compartment of the right knee again noted. Alignment between the arthroplasty components remains normal. Mild degenerative changes in the lateral compartment consisting of loss of joint space width and osteophytic spurring again noted. Moderate osteophytic spurring in the patellofemoral compartment again noted. No evidence for fracture, dislocation or hardware failure. PORTER BOWENS MD    Us Lower Extremity Venous Duplex Right  Result Date: 8/27/2018  ULTRASOUND RIGHT LOWER EXTREMITY VENOUS DUPLEX August 27, 2018 10:35 AM HISTORY: Right posterior leg pain. TECHNIQUE: Spectral Doppler and waveform analysis were performed on the right upper extremity veins. COMPARISON: 7/27/2010. FINDINGS: The right internal jugular, subclavian, axillary, and brachial veins are patent and negative for deep venous thrombosis. The right basilic, cephalic, radial, and ulnar veins also appear  patent and negative for thrombus. Cystic lesion in the right popliteal fossa measures 3.5 x 2 x 2.7 cm, and likely represents a Baker's cyst. This popliteal cystic lesion appears to correspond to the area of the patient's pain. This finding has decreased in size since the previous exam.   IMPRESSION: 1. No evidence for deep venous thrombosis in the right upper extremity. 2. A probable right Baker's cyst measures 3.5 cm, and appears to correspond to the area of the patient's pain. NE COOPER MD    Assessment:  1. Acute pain of both knees    2. Status post bilateral knee replacements    3. Effusion of lower leg joint      Knee pain and swelling s/p knee replacements with documented Baker's Cyst on right and relief with aspiration and injection.  Reviewed differential for symptoms post replacement including loosening of the prosthesis and possible infection. Irritation of underlying lateral compartment and patellofemoral arthritis also on the differential.  We'll start initial workup, and will likely have patient follow-up with orthopedic surgery. Recommend labs including CBC, ESR and CRP.    Plan:  - Today's Plan of Care:  Referral to an Orthopedic Surgeon  Inflammatory labs ordered  Continue with Rest, Ice, Compression, and Elevation.  Apply ice 10-15 minutes every 2-3 hours    Follow Up: will call with lab results    Concerning signs and symptoms were reviewed.  The patient expressed understanding of this management plan and all questions were answered at this time.    Cira Jennings MD [facility]  Primary Care Sports Medicine  [facility]

## 2018-09-07 ENCOUNTER — TELEPHONE (OUTPATIENT)
Dept: ORTHOPEDICS | Facility: CLINIC | Age: 83
End: 2018-09-07

## 2018-09-07 NOTE — TELEPHONE ENCOUNTER
Please call patient with lab results and to check on clinical status.  - How is she doing? Any fevers or chills? Worsening knee pain or redness?    Component      Latest Ref Rng & Units 9/5/2018   WBC      4.0 - 11.0 10e9/L 13.7 (H)   RBC Count      3.8 - 5.2 10e12/L 4.96   Hemoglobin      11.7 - 15.7 g/dL 13.9   Hematocrit      35.0 - 47.0 % 44.6   MCV      78 - 100 fl 90   MCH      26.5 - 33.0 pg 28.0   MCHC      31.5 - 36.5 g/dL 31.2 (L)   RDW      10.0 - 15.0 % 13.2   Platelet Count      150 - 450 10e9/L 587 (H)   Diff Method       Automated Method   % Neutrophils      % 69.7   % Lymphocytes      % 14.6   % Monocytes      % 6.6   % Eosinophils      % 7.7   % Basophils      % 0.9   % Immature Granulocytes      % 0.5   Nucleated RBCs      0 /100 0   Absolute Neutrophil      1.6 - 8.3 10e9/L 9.6 (H)   Absolute Lymphocytes      0.8 - 5.3 10e9/L 2.0   Absolute Monocytes      0.0 - 1.3 10e9/L 0.9   Absolute Eosinophils      0.0 - 0.7 10e9/L 1.1 (H)   Absolute Basophils      0.0 - 0.2 10e9/L 0.1   Abs Immature Granulocytes      0 - 0.4 10e9/L 0.1   Absolute Nucleated RBC       0.0   Sed Rate      0 - 30 mm/h 71 (H)   CRP Inflammation      0.0 - 8.0 mg/L 66.5 (H)       In Summary:  - WBC count and markers of inflammation (ESR and CRP) are slightly elevated    I Recommend:  - Follow up with orthopedic surgery as discussed and with PCP.  Does she have orthopedic surgery appointment schedule yet?  - Given elevate WBC and inflammatory markers which can indicate infection, if patient has any increased pain, fevers, chills, redness in her knees, I recommend she be seen in the ER    - Please let me know if patient has any questions/concerns    Cira Jennings MD

## 2018-09-07 NOTE — TELEPHONE ENCOUNTER
LVM for patient requesting a return call to discuss Lab results and recommendations. Requested patient leave  with 2-3 times that would work best to reach her today or Monday.  Jeancarlos Wallace, ATC

## 2018-09-11 NOTE — TELEPHONE ENCOUNTER
Called and spoke with patient, she has an appointment on Thursday with Dr. Lieberman.    No signs of infection.  Discussed that if she has any signs of infection, or generally feeling ill she is to seek care immediately.  She will call back with any questions.    Gretel Jamison MS ATC

## 2018-09-13 ENCOUNTER — TRANSFERRED RECORDS (OUTPATIENT)
Dept: HEALTH INFORMATION MANAGEMENT | Facility: CLINIC | Age: 83
End: 2018-09-13

## 2018-09-13 LAB
APPEARANCE FLD: NORMAL
COLOR FLD: YELLOW
CRYSTALS SNV MICRO: NORMAL
EOSINOPHIL NFR FLD MANUAL: 1 %
LYMPHOCYTES NFR FLD MANUAL: 13 %
MONOS+MACROS NFR FLD MANUAL: 1 %
NEUTS BAND NFR FLD MANUAL: 85 %
SPECIMEN SOURCE FLD: NORMAL
WBC # FLD AUTO: 14.3 /UL

## 2018-09-13 PROCEDURE — 89051 BODY FLUID CELL COUNT: CPT | Performed by: ORTHOPAEDIC SURGERY

## 2018-09-13 PROCEDURE — 89060 EXAM SYNOVIAL FLUID CRYSTALS: CPT | Performed by: ORTHOPAEDIC SURGERY

## 2018-12-11 DIAGNOSIS — I10 ESSENTIAL HYPERTENSION, BENIGN: ICD-10-CM

## 2018-12-11 RX ORDER — LOSARTAN POTASSIUM 100 MG/1
100 TABLET ORAL DAILY
Qty: 30 TABLET | Refills: 0 | Status: SHIPPED | OUTPATIENT
Start: 2018-12-11 | End: 2018-12-19

## 2018-12-11 NOTE — TELEPHONE ENCOUNTER
"Requested Prescriptions   Pending Prescriptions Disp Refills     losartan (COZAAR) 100 MG tablet 90 tablet 3    Last Written Prescription Date:  11/21/17  Last Fill Quantity: 90,  # refills: 3   Last office visit: 1/26/2018 with prescribing provider:  Nurse   Future Office Visit:   Next 5 appointments (look out 90 days)    Feb 27, 2019 11:00 AM CST  Return Visit with Wade Padgett MD  [facility] [address] [facility] [address]  [phone]          Sig: Take 1 tablet (100 mg) by mouth daily    Angiotensin-II Receptors Failed - 12/11/2018 11:17 AM       Failed - Recent (12 mo) or future (30 days) visit within the authorizing provider's specialty    Patient had office visit in the last 12 months or has a visit in the next 30 days with authorizing provider or within the authorizing provider's specialty.  See \"Patient Info\" tab in inbasket, or \"Choose Columns\" in Meds & Orders section of the refill encounter.             Failed - Normal serum creatinine on file in past 12 months    Recent Labs   Lab Test 11/15/16  1310   CR 0.86            Failed - Normal serum potassium on file in past 12 months    Recent Labs   Lab Test 11/15/16  1310   POTASSIUM 4.0                   Passed - Blood pressure under 140/90 in past 12 months    BP Readings from Last 3 Encounters:   09/05/18 138/89   08/29/18 146/80   08/27/18 122/76                Passed - Patient is age 18 or older       Passed - No active pregnancy on record       Passed - No positive pregnancy test in past 12 months          "

## 2018-12-19 ENCOUNTER — OFFICE VISIT (OUTPATIENT)
Dept: FAMILY MEDICINE | Facility: CLINIC | Age: 83
End: 2018-12-19
Payer: MEDICARE

## 2018-12-19 VITALS
SYSTOLIC BLOOD PRESSURE: 134 MMHG | HEIGHT: 68 IN | TEMPERATURE: 98 F | BODY MASS INDEX: 24.63 KG/M2 | HEART RATE: 92 BPM | DIASTOLIC BLOOD PRESSURE: 68 MMHG | OXYGEN SATURATION: 95 % | WEIGHT: 162.5 LBS

## 2018-12-19 DIAGNOSIS — C43.9 MALIGNANT MELANOMA, UNSPECIFIED SITE (H): ICD-10-CM

## 2018-12-19 DIAGNOSIS — M85.89 OSTEOPENIA OF MULTIPLE SITES: ICD-10-CM

## 2018-12-19 DIAGNOSIS — I10 ESSENTIAL HYPERTENSION, BENIGN: ICD-10-CM

## 2018-12-19 DIAGNOSIS — E78.5 HYPERLIPIDEMIA LDL GOAL <130: ICD-10-CM

## 2018-12-19 DIAGNOSIS — Z00.00 ENCOUNTER FOR GENERAL ADULT MEDICAL EXAMINATION WITHOUT ABNORMAL FINDINGS: Primary | ICD-10-CM

## 2018-12-19 PROCEDURE — G0439 PPPS, SUBSEQ VISIT: HCPCS | Performed by: INTERNAL MEDICINE

## 2018-12-19 RX ORDER — LOVASTATIN 20 MG
20 TABLET ORAL AT BEDTIME
Qty: 90 TABLET | Refills: 3 | Status: SHIPPED | OUTPATIENT
Start: 2018-12-19 | End: 2020-02-07

## 2018-12-19 RX ORDER — LOSARTAN POTASSIUM 100 MG/1
100 TABLET ORAL DAILY
Qty: 90 TABLET | Refills: 3 | Status: SHIPPED | OUTPATIENT
Start: 2018-12-19 | End: 2020-02-07

## 2018-12-19 ASSESSMENT — MIFFLIN-ST. JEOR: SCORE: 1230.6

## 2018-12-19 NOTE — PATIENT INSTRUCTIONS
Preventive Health Recommendations    See your health care provider every year to    Review health changes.     Discuss preventive care.      Review your medicines if your doctor has prescribed any.      You no longer need a yearly Pap test unless you've had an abnormal Pap test in the past 10 years. If you have vaginal symptoms, such as bleeding or discharge, be sure to talk with your provider about a Pap test.      Every 1 to 2 years, have a mammogram.  If you are over 69, talk with your health care provider about whether or not you want to continue having screening mammograms.      Every 10 years, have a colonoscopy. Or, have a yearly FIT test (stool test). These exams will check for colon cancer.       Have a cholesterol test every 5 years, or more often if your doctor advises it.       Have a diabetes test (fasting glucose) every three years. If you are at risk for diabetes, you should have this test more often.       At age 65, have a bone density scan (DEXA) to check for osteoporosis (brittle bone disease).    Shots:    Get a flu shot each year.    Get a tetanus shot every 10 years.    Talk to your doctor about your pneumonia vaccines. There are now two you should receive - Pneumovax (PPSV 23) and Prevnar (PCV 13).    Talk to your pharmacist about the shingles vaccine.    Talk to your doctor about the hepatitis B vaccine.    Nutrition:     Eat at least 5 servings of fruits and vegetables each day.      Eat whole-grain bread, whole-wheat pasta and brown rice instead of white grains and rice.      Get adequate Calcium and Vitamin D.     Lifestyle    Exercise at least 150 minutes a week (30 minutes a day, 5 days a week). This will help you control your weight and prevent disease.      Limit alcohol to one drink per day.      No smoking.       Wear sunscreen to prevent skin cancer.       See your dentist twice a year for an exam and cleaning.      See your eye doctor every 1 to 2 years to screen for conditions  such as glaucoma, macular degeneration and cataracts.    Personalized Prevention Plan  You are due for the preventive services outlined below.  Your care team is available to assist you in scheduling these services.  If you have already completed any of these items, please share that information with your care team to update in your medical record.  Health Maintenance Due   Topic Date Due     Skin Cancer Screening  08/08/1933     Annual Skin Cancer Screening  03/16/2016     Zoster (Chicken Pox) Vaccine (2 of 3) 12/22/2016     Depression Assessment 2 - yearly  11/21/2018     FALL RISK ASSESSMENT  11/21/2018       1. Come back for fasting blood work.  Water, Black coffee and meds are OK  2. Refills given.  3. Vitamin D is most important supplement.  Others are probably not needed, but will leave the final decision up to you.  4. Get updated Bone Density Test.  Work to increase exercise, maybe include light weights  5. Return to clinic 1 year, fasting labs prior to visit.    You may need to start therapy for osteoporosis with a bisphosphonate (Actonel, Fosamax or Reclast) - depending on your bone density scan    --Your risk of hip or spine fracture is elevated.  We can calculate your individual risk.  --The most common side effect of bisphosphonates is stomach irritation (less than 7%). Many people with history of GERD or ulcers can tolerate these medications fine.  There are specific instructions on how to take the medication and this reduces the chance of GI irritation.  --Another common side effects is muscle/bone pain (less than 6%).    Very rare side effects of bisphosphonates include osteonecrosis of the jaw and atypical thigh (Femur) fractures.  --your risk of osteonecrosis of the jaw is 1 in 10,000.  --your risk of atypical femur fracture is 1 in 100,000  --your lifetime risk of getting struck by lightening is 1 in 3,000

## 2018-12-19 NOTE — PROGRESS NOTES
"SUBJECTIVE:   Gabriela Agrawal is a 85 year old female who presents for Preventive Visit.    Chief Complaint   Patient presents with     Medicare Visit     Refill Request     losartan, lovastatin        Are you in the first 12 months of your Medicare Part B coverage?  No    Physical Health:    In general, how would you rate your overall physical health? Good to fair     Outside of work, how many days during the week do you exercise? none    Outside of work, approximately how many minutes a day do you exercise?not applicable    If you drink alcohol do you typically have >3 drinks per day or >7 drinks per week? No    Do you usually eat at least 4 servings of fruit and vegetables a day, include whole grains & fiber and avoid regularly eating high fat or \"junk\" foods? Yes    Do you have any problems taking medications regularly?  No    Do you have any side effects from medications? none    Needs assistance for the following daily activities: no assistance needed    Which of the following safety concerns are present in your home?  lack of grab bars in the bathroom     Hearing impairment: No    In the past 6 months, have you been bothered by leaking of urine? no    Mental Health:    In general, how would you rate your overall mental or emotional health? good  PHQ-2 Score:      Do you feel safe in your environment? Yes    Do you have a Health Care Directive? Yes: Advance Directive has been received and scanned.    Additional concerns to address?  No    Fall risk:  Fallen 2 or more times in the past year?: No  Any fall with injury in the past year?: No  click delete button to remove this line now  Cognitive Screenin) Repeat 3 items (Leader, Season, Table)    2) Clock draw: NORMAL  3) 3 item recall: Recalls 1 object   Results: NORMAL clock, 1-2 items recalled: COGNITIVE IMPAIRMENT LESS LIKELY    Mini-CogTM Copyright LATHA Jimenez. Licensed by the author for use in [facility]; reprinted with permission " (chidi@.[facility]). All rights reserved.      Do you have sleep apnea, excessive snoring or daytime drowsiness?: no    Polypharmacy: wants to review her supplements  Mag - to help with constipation  Tumeric - daily, read it is beneficial  MVI  B12  Vitamin C  Vitamin D 2000 units daily      Reviewed and updated as needed this visit by clinical staff  Tobacco  Allergies  Meds  Med Hx  Surg Hx  Fam Hx  Soc Hx        Reviewed and updated as needed this visit by Provider        Social History     Tobacco Use     Smoking status: Former Smoker     Packs/day: 1.50     Years: 22.50     Pack years: 33.75     Last attempt to quit: 1982     Years since quittin.9     Smokeless tobacco: Never Used   Substance Use Topics     Alcohol use: Yes     Comment: 4-6 a week                           Current providers sharing in care for this patient include:   Patient Care Team:  Coco Mejia DO as PCP - General (Internal Medicine)    The following health maintenance items are reviewed in Epic and correct as of today:  Health Maintenance   Topic Date Due     SKIN CANCER SCREENING Q6 MOS (NO IN BASKET)  1933     SKIN CANCER SCREENING Q1 YR (NO IN BASKET)  2016     ZOSTER IMMUNIZATION (2 of 3) 2016     PHQ-2 Q1 YR  2018     FALL RISK ASSESSMENT  2018     ADVANCE DIRECTIVE PLANNING Q5 YRS  2019     DTAP/TDAP/TD IMMUNIZATION (2 - Td) 02/10/2021     INFLUENZA VACCINE  Completed     IPV IMMUNIZATION  Aged Out     MENINGITIS IMMUNIZATION  Aged Out     Current Outpatient Medications   Medication Sig Dispense Refill     aspirin 81 MG tablet Take 1 tablet by mouth daily.  3     Calcium Carbonate-Vitamin D (CALCIUM + D PO) Take 1 tablet by mouth daily.       cholecalciferol (VITAMIN D) 1000 UNIT tablet Take 1 tablet by mouth daily  100 tablet 12     Coenzyme Q10 (CO Q-10 PO) Take by mouth daily       Cyanocobalamin (VITAMIN B-12 PO) Take 1 tablet by mouth daily       losartan (COZAAR) 100 MG  "tablet Take 1 tablet (100 mg) by mouth daily 30 tablet 0     lovastatin (MEVACOR) 20 MG tablet Take 1 tablet (20 mg) by mouth At Bedtime 90 tablet 3     Magnesium Oxide, 4599199810, (MAG-200 PO) Take 1 tablet by mouth daily.       MULTI-VITAMIN OR TABS 1 TABLET DAILY       NONFORMULARY Tumeric daily       VITAMIN C OR 1 TABLET DAILY       OVER-THE-COUNTER Tumeric-Curcumen 1 capsule daily , unknown dose           ROS:  Constitutional, HEENT, cardiovascular, pulmonary, gi and gu systems are negative, except as otherwise noted.    OBJECTIVE:   /68 (BP Location: Right arm, Patient Position: Sitting, Cuff Size: Adult Large)   Pulse 92   Temp 98  F (36.7  C) (Tympanic)   Ht 1.727 m (5' 8\")   Wt 73.7 kg (162 lb 8 oz)   SpO2 95%   BMI 24.71 kg/m   Estimated body mass index is 24.63 kg/m  as calculated from the following:    Height as of 9/5/18: 1.727 m (5' 8\").    Weight as of 9/5/18: 73.5 kg (162 lb).  EXAM:   GENERAL: healthy, alert and no distress  EYES: Eyes grossly normal to inspection, PERRL and conjunctivae and sclerae normal  HENT: ear canals and TM's normal, nose and mouth without ulcers or lesions  NECK: no adenopathy, no asymmetry, masses, or scars and thyroid normal to palpation  RESP: lungs clear to auscultation - no rales, rhonchi or wheezes  CV: regular rate and rhythm, normal S1 S2, no S3 or S4, no murmur, click or rub, no peripheral edema and peripheral pulses strong  ABDOMEN: soft, nontender, no hepatosplenomegaly, no masses and bowel sounds normal  MS: no gross musculoskeletal defects noted, no edema  SKIN: no suspicious lesions or rashes  NEURO: Normal strength and tone, mentation intact and speech normal  PSYCH: mentation appears normal, affect normal/bright      ASSESSMENT / PLAN:   1. Encounter for general adult medical examination without abnormal findings    2. Essential hypertension, benign -  - stable, refill provided  - losartan (COZAAR) 100 MG tablet; Take 1 tablet (100 mg) by mouth " "daily  Dispense: 90 tablet; Refill: 3  - Basic metabolic panel  - **Basic metabolic panel FUTURE 1yr; Future    3. Hyperlipidemia LDL goal <130 -  - stable, refill provided  - lovastatin (MEVACOR) 20 MG tablet; Take 1 tablet (20 mg) by mouth At Bedtime  Dispense: 90 tablet; Refill: 3  - Lipid panel reflex to direct LDL Fasting  - Lipid panel reflex to direct LDL Fasting; Future    4. Osteopenia of multiple sites-recommend repeat bone density and if in the osteoporosis range, would recommend starting bisphosphonate.  Did have discussion on the risks and the benefits of bisphosphonate, patient would be open to starting this.  - DX Hip/Pelvis/Spine; Future    5. Malignant Melanoma: Follows with dermatology.      1. Come back for fasting blood work.  Water, Black coffee and meds are OK  2. Refills given.  3. Vitamin D is most important supplement.  Others are probably not needed, but will leave the final decision up to you.  4. Get updated Bone Density Test.  Work to increase exercise, maybe include light weights  5. Return to clinic 1 year, fasting labs prior to visit.    End of Life Planning:  Patient currently has an advanced directive: Yes.  Practitioner is supportive of decision.    COUNSELING:  Reviewed preventive health counseling, as reflected in patient instructions    BP Readings from Last 1 Encounters:   09/05/18 138/89     Estimated body mass index is 24.63 kg/m  as calculated from the following:    Height as of 9/5/18: 1.727 m (5' 8\").    Weight as of 9/5/18: 73.5 kg (162 lb).           reports that she quit smoking about 36 years ago. She has a 33.75 pack-year smoking history. she has never used smokeless tobacco.      Appropriate preventive services were discussed with this patient, including applicable screening as appropriate for cardiovascular disease, diabetes, osteopenia/osteoporosis, and glaucoma.  As appropriate for age/gender, discussed screening for colorectal cancer, prostate cancer, breast " cancer, and cervical cancer. Checklist reviewing preventive services available has been given to the patient.    Reviewed patients plan of care and provided an AVS. The Complex Care Plan (for patients with higher acuity and needing more deliberate coordination of services) for Gabriela meets the Care Plan requirement. This Care Plan has been established and reviewed with the Patient.    Counseling Resources:  ATP IV Guidelines  Pooled Cohorts Equation Calculator  Breast Cancer Risk Calculator  FRAX Risk Assessment  ICSI Preventive Guidelines  Dietary Guidelines for Americans, 2010  USDA's MyPlate  ASA Prophylaxis  Lung CA Screening    Coco Mejia DO  [facility]

## 2019-01-07 DIAGNOSIS — I10 ESSENTIAL HYPERTENSION, BENIGN: ICD-10-CM

## 2019-01-07 DIAGNOSIS — E78.5 HYPERLIPIDEMIA LDL GOAL <130: ICD-10-CM

## 2019-01-07 PROBLEM — R73.01 ELEVATED FASTING BLOOD SUGAR: Status: ACTIVE | Noted: 2019-01-07

## 2019-01-07 LAB
ANION GAP SERPL CALCULATED.3IONS-SCNC: 7 MMOL/L (ref 3–14)
BUN SERPL-MCNC: 10 MG/DL (ref 7–30)
CALCIUM SERPL-MCNC: 9.7 MG/DL (ref 8.5–10.1)
CHLORIDE SERPL-SCNC: 104 MMOL/L (ref 94–109)
CHOLEST SERPL-MCNC: 173 MG/DL
CO2 SERPL-SCNC: 28 MMOL/L (ref 20–32)
CREAT SERPL-MCNC: 0.84 MG/DL (ref 0.52–1.04)
GFR SERPL CREATININE-BSD FRML MDRD: 64 ML/MIN/{1.73_M2}
GLUCOSE SERPL-MCNC: 106 MG/DL (ref 70–99)
HDLC SERPL-MCNC: 71 MG/DL
LDLC SERPL CALC-MCNC: 77 MG/DL
NONHDLC SERPL-MCNC: 102 MG/DL
POTASSIUM SERPL-SCNC: 4.3 MMOL/L (ref 3.4–5.3)
SODIUM SERPL-SCNC: 139 MMOL/L (ref 133–144)
TRIGL SERPL-MCNC: 127 MG/DL

## 2019-01-07 PROCEDURE — 80061 LIPID PANEL: CPT | Performed by: INTERNAL MEDICINE

## 2019-01-07 PROCEDURE — 36415 COLL VENOUS BLD VENIPUNCTURE: CPT | Performed by: INTERNAL MEDICINE

## 2019-01-07 PROCEDURE — 80048 BASIC METABOLIC PNL TOTAL CA: CPT | Performed by: INTERNAL MEDICINE

## 2019-01-07 NOTE — RESULT ENCOUNTER NOTE
Cholesterol improved from 3 years ago.  Kidney function, electrolytes are normal.  Fasting blood sugar mildly elevated.  Repeat labs in 1 year

## 2019-01-07 NOTE — LETTER
January 7, 2019      Gabriela Agrawal  [address] [address] [facility] [place] [address]        Dear ,    We are writing to inform you of your test results.    Cholesterol improved from 3 years ago.  Kidney function, electrolytes are normal.  Fasting blood sugar mildly elevated.  Repeat labs in 1 year    Resulted Orders   Lipid panel reflex to direct LDL Fasting   Result Value Ref Range    Cholesterol 173 <200 mg/dL    Triglycerides 127 <150 mg/dL      Comment:      Fasting specimen    HDL Cholesterol 71 >49 mg/dL    LDL Cholesterol Calculated 77 <100 mg/dL      Comment:      Desirable:       <100 mg/dl    Non HDL Cholesterol 102 <130 mg/dL   **Basic metabolic panel FUTURE 14d   Result Value Ref Range    Sodium 139 133 - 144 mmol/L    Potassium 4.3 3.4 - 5.3 mmol/L    Chloride 104 94 - 109 mmol/L    Carbon Dioxide 28 20 - 32 mmol/L    Anion Gap 7 3 - 14 mmol/L    Glucose 106 (H) 70 - 99 mg/dL      Comment:      Fasting specimen    Urea Nitrogen 10 7 - 30 mg/dL    Creatinine 0.84 0.52 - 1.04 mg/dL    GFR Estimate 64 >60 mL/min/[1.73_m2]      Comment:      Non  GFR Calc  Starting 12/18/2018, serum creatinine based estimated GFR (eGFR) will be   calculated using the Chronic Kidney Disease Epidemiology Collaboration   (CKD-EPI) equation.      GFR Estimate If Black 74 >60 mL/min/[1.73_m2]      Comment:       GFR Calc  Starting 12/18/2018, serum creatinine based estimated GFR (eGFR) will be   calculated using the Chronic Kidney Disease Epidemiology Collaboration   (CKD-EPI) equation.      Calcium 9.7 8.5 - 10.1 mg/dL       If you have any questions or concerns, please call the clinic at the number listed above.       Sincerely,      Dr. Mejia/cornelius

## 2019-03-18 ENCOUNTER — OFFICE VISIT (OUTPATIENT)
Dept: DERMATOLOGY | Facility: CLINIC | Age: 84
End: 2019-03-18
Payer: MEDICARE

## 2019-03-18 VITALS
SYSTOLIC BLOOD PRESSURE: 153 MMHG | BODY MASS INDEX: 25.45 KG/M2 | DIASTOLIC BLOOD PRESSURE: 82 MMHG | HEART RATE: 92 BPM | HEIGHT: 67 IN

## 2019-03-18 DIAGNOSIS — L81.4 LENTIGO: ICD-10-CM

## 2019-03-18 DIAGNOSIS — Z85.828 HISTORY OF SKIN CANCER: ICD-10-CM

## 2019-03-18 DIAGNOSIS — D18.00 ANGIOMA: ICD-10-CM

## 2019-03-18 DIAGNOSIS — L82.1 SEBORRHEIC KERATOSIS: Primary | ICD-10-CM

## 2019-03-18 DIAGNOSIS — D23.9 DERMAL NEVUS: ICD-10-CM

## 2019-03-18 DIAGNOSIS — Z85.820 HISTORY OF MELANOMA: ICD-10-CM

## 2019-03-18 PROCEDURE — 99213 OFFICE O/P EST LOW 20 MIN: CPT | Performed by: DERMATOLOGY

## 2019-03-18 NOTE — LETTER
3/18/2019         RE: Gabriela Agrawal  [address] [address] [facility] [facility] [place] [address]        Dear Colleague,    Thank you for referring your patient, Gabriela Agrawal, to the [facility]. Please see a copy of my visit note below.    Gabriela Agrawal is a 85 year old year old female patient here today for f/u hx of non-melanoma skin cancer and melanoma.  Today she has no new or changing skin lesions.  Patient reports the following symptoms:   none. Patient reports the following previous treatments none.  Patient reports the following modifying factors none.  Associated symptoms: none.  Patient has no other skin complaints today.  Remainder of the HPI, Meds, PMH, Allergies, FH, and SH was reviewed in chart.    Pertinent Hx:      Melanoma, non-melanoma skin cancer   Past Medical History        Past Medical History:   Diagnosis Date     Basal cell carcinoma       Bronchiolitis obliterans (H)       Diffuse cystic mastopathy       Bresat Fibrocystic Disease     Disorder of bone and cartilage, unspecified       Malignant melanoma (H)       Nonspecific (abnormal) findings on radiological and other examination of lung field       Other abnormal blood chemistry       Other specified alveolar and parietoalveolar pneumonopathies       Squamous cell carcinoma       Syncope and collapse       2007 holter neg            Past Surgical History         Past Surgical History:   Procedure Laterality Date     APPENDECTOMY OPEN         BREAST SURGERY         biopsy     COLONOSCOPY   2/8/2013     Procedure: COLONOSCOPY;  Colonoscopy  ;  Surgeon: Norah Whaley MD;  Location: [facility]     EYE SURGERY         keratoplasty     HERNIA REPAIR         left inginal     HYSTERECTOMY         including ovaries     KNEE SURGERY         2010, 2009     LUNG SURGERY         biopsy     melanoma  resection                Family History         Family History   Problem Relation Age of Onset     CANCER Other       Family History Negative  Other              Social History   Social History            Social History     Marital status:        Spouse name: N/A     Number of children: N/A     Years of education: N/A           Occupational History      Retired              Social History Main Topics     Smoking status: Former Smoker       Packs/day: 1.50       Years: 22.50       Quit date: 1/1/1982     Smokeless tobacco: Never Used     Alcohol use Yes          Comment: 4-6 a week     Drug use: No     Sexual activity: Not on file           Other Topics Concern     Parent/Sibling W/ Cabg, Mi Or Angioplasty Before 65f 55m? No      Social History Narrative            Encounter Medications          Outpatient Encounter Prescriptions as of 2/26/2018   Medication Sig Dispense Refill     lovastatin (MEVACOR) 20 MG tablet Take 1 tablet (20 mg) by mouth At Bedtime 90 tablet 3     losartan (COZAAR) 100 MG tablet Take 1 tablet (100 mg) by mouth daily 90 tablet 3     Cyanocobalamin (VITAMIN B-12 PO) Take 1 tablet by mouth daily         OVER-THE-COUNTER Tumeric-Curcumen 1 capsule daily , unknown dose         Calcium Carbonate-Vitamin D (CALCIUM + D PO) Take 1 tablet by mouth daily.         Magnesium Oxide, 4340888105, (MAG-200 PO) Take 1 tablet by mouth daily.         fish oil-omega-3 fatty acids 1000 MG capsule Take 1 g by mouth 2 times daily. 180 capsule 12     cholecalciferol (VITAMIN D) 1000 UNIT tablet Take 1 tablet by mouth daily. 100 tablet 12     aspirin 81 MG tablet Take 1 tablet by mouth daily.   3     MULTI-VITAMIN OR TABS 1 TABLET DAILY         VITAMIN C OR 1 TABLET DAILY          No facility-administered encounter medications on file as of 2/26/2018.                      Review Of Systems  Skin: As above  Eyes: negative  Ears/Nose/Throat: negative  Respiratory: No shortness of breath, dyspnea on exertion, cough, or hemoptysis  Cardiovascular: negative  Gastrointestinal: negative  Genitourinary: negative  Musculoskeletal:  negative  Neurologic: negative  Psychiatric: negative  Hematologic/Lymphatic/Immunologic: negative  Endocrine: negative        O:                         NAD, WDWN, Alert & Oriented, Mood & Affect wnl, Vitals stable                               Here today alone                               /82  Pulse 88  SpO2 95%                               General appearance normal                               Vitals stable                               Alert, oriented and in no acute distress                                                              Following lymph nodes palpated: Occipital, Cervical, Supraclavicular , axilla no lad                               Stuck on papules and brown macules on trunk and ext                                Red papules on trunk   Flesh colore dpapule son face and ext   The remainder of the full exam was unremarkable; the following areas were examined:  conjunctiva/lids, oral mucosa, neck, peripheral vascular system, abdomen, lymph nodes, digits/nails, eccrine and apocrine glands, scalp/hair, face, neck, chest, abdomen, buttocks, back, RUE, LUE, RLE, LLE                                    Eyes: Conjunctivae/lids:Normal                                 ENT: Lips, buccal mucosa, tongue: normal                                MSK:Normal                                Cardiovascular: peripheral edema none                                Pulm: Breathing Normal                                Lymph Nodes: No Head and Neck Lymphadenopathy                                 Neuro/Psych: Orientation:Normal; Mood/Affect:Normal        A/P:  3. Seborrheic keratosis, lentigo, angioma, hx of melanoma, non-melanoma skin cancer  , dermal nevs  MELANOMA DISCUSSED WITH PATIENT:  I discussed the specifics of tumor, prognosis, metachronous melanoma, self exam, and genetics with the patient. I explained the need for monthly skin exams including and taught the patient how to do this. Patient was asked about  new or changing moles and a full skin exam was performed.   BENIGN LESIONS DISCUSSED WITH PATIENT:  I discussed the specifics of tumor, prognosis, and genetics of benign lesions.  I explained that treatment of these lesions would be purely cosmetic and not medically neccessary.  I discussed with patient different removal options including excision, cautery and /or laser.       Nature and genetics of benign skin lesions dicussed with patient.  Signs and Symptoms of skin cancer discussed with patient.  ABCDEs of melanoma reviewed with patient.  Patient encouraged to perform monthly skin exams.  UV precautions reviewed with patient.  Skin care regimen reviewed with patient: Eliminate harsh soaps, i.e. Dial, zest, irsih spring; Mild soaps such as Cetaphil or Dove sensitive skin, avoid hot or cold showers, aggressive use of emollients including vanicream, cetaphil or cerave discussed with patient.    Risks of non-melanoma skin cancer discussed with patient   Return to clinic 12 months         Again, thank you for allowing me to participate in the care of your patient.        Sincerely,        Wade Padgett MD

## 2019-03-18 NOTE — PROGRESS NOTES
Gabriela Agrawal is a 85 year old year old female patient here today for f/u hx of non-melanoma skin cancer and melanoma.  Today she has no new or changing skin lesions.  Patient reports the following symptoms:  none. Patient reports the following previous treatments none.  Patient reports the following modifying factors none.  Associated symptoms: none.  Patient has no other skin complaints today.  Remainder of the HPI, Meds, PMH, Allergies, FH, and SH was reviewed in chart.    Pertinent Hx:      Melanoma, non-melanoma skin cancer   Past Medical History        Past Medical History:   Diagnosis Date     Basal cell carcinoma       Bronchiolitis obliterans (H)       Diffuse cystic mastopathy       Bresat Fibrocystic Disease     Disorder of bone and cartilage, unspecified       Malignant melanoma (H)       Nonspecific (abnormal) findings on radiological and other examination of lung field       Other abnormal blood chemistry       Other specified alveolar and parietoalveolar pneumonopathies       Squamous cell carcinoma       Syncope and collapse       2007 holter neg            Past Surgical History         Past Surgical History:   Procedure Laterality Date     APPENDECTOMY OPEN         BREAST SURGERY         biopsy     COLONOSCOPY   2/8/2013     Procedure: COLONOSCOPY;  Colonoscopy  ;  Surgeon: Norah Whaley MD;  Location: [facility]     EYE SURGERY         keratoplasty     HERNIA REPAIR         left inginal     HYSTERECTOMY         including ovaries     KNEE SURGERY         2010, 2009     LUNG SURGERY         biopsy     melanoma  resection                Family History         Family History   Problem Relation Age of Onset     CANCER Other       Family History Negative Other              Social History   Social History            Social History     Marital status:        Spouse name: N/A     Number of children: N/A     Years of education: N/A           Occupational History      Retired               Social History Main Topics     Smoking status: Former Smoker       Packs/day: 1.50       Years: 22.50       Quit date: 1/1/1982     Smokeless tobacco: Never Used     Alcohol use Yes          Comment: 4-6 a week     Drug use: No     Sexual activity: Not on file      Other Topics Concern     Parent/Sibling W/ Cabg, Mi Or Angioplasty Before 65f 55m? No      Social History Narrative            Encounter Medications          Outpatient Encounter Prescriptions as of 2/26/2018   Medication Sig Dispense Refill     lovastatin (MEVACOR) 20 MG tablet Take 1 tablet (20 mg) by mouth At Bedtime 90 tablet 3     losartan (COZAAR) 100 MG tablet Take 1 tablet (100 mg) by mouth daily 90 tablet 3     Cyanocobalamin (VITAMIN B-12 PO) Take 1 tablet by mouth daily         OVER-THE-COUNTER Tumeric-Curcumen 1 capsule daily , unknown dose         Calcium Carbonate-Vitamin D (CALCIUM + D PO) Take 1 tablet by mouth daily.         Magnesium Oxide, 3281707944, (MAG-200 PO) Take 1 tablet by mouth daily.         fish oil-omega-3 fatty acids 1000 MG capsule Take 1 g by mouth 2 times daily. 180 capsule 12     cholecalciferol (VITAMIN D) 1000 UNIT tablet Take 1 tablet by mouth daily. 100 tablet 12     aspirin 81 MG tablet Take 1 tablet by mouth daily.   3     MULTI-VITAMIN OR TABS 1 TABLET DAILY         VITAMIN C OR 1 TABLET DAILY          No facility-administered encounter medications on file as of 2/26/2018.                      Review Of Systems  Skin: As above  Eyes: negative  Ears/Nose/Throat: negative  Respiratory: No shortness of breath, dyspnea on exertion, cough, or hemoptysis  Cardiovascular: negative  Gastrointestinal: negative  Genitourinary: negative  Musculoskeletal: negative  Neurologic: negative  Psychiatric: negative  Hematologic/Lymphatic/Immunologic: negative  Endocrine: negative        O:                         NAD, WDWN, Alert & Oriented, Mood & Affect wnl, Vitals stable                               Here today alone                                /82  Pulse 88  SpO2 95%                               General appearance normal                               Vitals stable                               Alert, oriented and in no acute distress                                                              Following lymph nodes palpated: Occipital, Cervical, Supraclavicular , axilla no lad                               Stuck on papules and brown macules on trunk and ext                                Red papules on trunk   Flesh colore dpapule son face and ext   The remainder of the full exam was unremarkable; the following areas were examined:  conjunctiva/lids, oral mucosa, neck, peripheral vascular system, abdomen, lymph nodes, digits/nails, eccrine and apocrine glands, scalp/hair, face, neck, chest, abdomen, buttocks, back, RUE, LUE, RLE, LLE                                    Eyes: Conjunctivae/lids:Normal                                 ENT: Lips, buccal mucosa, tongue: normal                                MSK:Normal                                Cardiovascular: peripheral edema none                                Pulm: Breathing Normal                                Lymph Nodes: No Head and Neck Lymphadenopathy                                 Neuro/Psych: Orientation:Normal; Mood/Affect:Normal        A/P:  3. Seborrheic keratosis, lentigo, angioma, hx of melanoma, non-melanoma skin cancer , dermal nevs  MELANOMA DISCUSSED WITH PATIENT:  I discussed the specifics of tumor, prognosis, metachronous melanoma, self exam, and genetics with the patient. I explained the need for monthly skin exams including and taught the patient how to do this. Patient was asked about new or changing moles and a full skin exam was performed.   BENIGN LESIONS DISCUSSED WITH PATIENT:  I discussed the specifics of tumor, prognosis, and genetics of benign lesions.  I explained that treatment of these lesions would be purely cosmetic and not medically  neccessary.  I discussed with patient different removal options including excision, cautery and /or laser.       Nature and genetics of benign skin lesions dicussed with patient.  Signs and Symptoms of skin cancer discussed with patient.  ABCDEs of melanoma reviewed with patient.  Patient encouraged to perform monthly skin exams.  UV precautions reviewed with patient.  Skin care regimen reviewed with patient: Eliminate harsh soaps, i.e. Dial, zest, irsih spring; Mild soaps such as Cetaphil or Dove sensitive skin, avoid hot or cold showers, aggressive use of emollients including vanicream, cetaphil or cerave discussed with patient.    Risks of non-melanoma skin cancer discussed with patient   Return to clinic 12 months

## 2019-05-06 ENCOUNTER — OFFICE VISIT (OUTPATIENT)
Dept: FAMILY MEDICINE | Facility: CLINIC | Age: 84
End: 2019-05-06
Payer: MEDICARE

## 2019-05-06 ENCOUNTER — ANCILLARY PROCEDURE (OUTPATIENT)
Dept: GENERAL RADIOLOGY | Facility: CLINIC | Age: 84
End: 2019-05-06
Attending: NURSE PRACTITIONER
Payer: MEDICARE

## 2019-05-06 DIAGNOSIS — R60.0 HAND EDEMA: ICD-10-CM

## 2019-05-06 DIAGNOSIS — R60.0 HAND EDEMA: Primary | ICD-10-CM

## 2019-05-06 LAB — URATE SERPL-MCNC: 4.2 MG/DL (ref 2.6–6)

## 2019-05-06 PROCEDURE — 84550 ASSAY OF BLOOD/URIC ACID: CPT | Performed by: NURSE PRACTITIONER

## 2019-05-06 PROCEDURE — 36415 COLL VENOUS BLD VENIPUNCTURE: CPT | Performed by: NURSE PRACTITIONER

## 2019-05-06 PROCEDURE — 73130 X-RAY EXAM OF HAND: CPT | Mod: RT | Performed by: FAMILY MEDICINE

## 2019-05-06 PROCEDURE — 99214 OFFICE O/P EST MOD 30 MIN: CPT | Performed by: NURSE PRACTITIONER

## 2019-05-06 RX ORDER — PREDNISONE 10 MG/1
40 TABLET ORAL DAILY
Qty: 20 TABLET | Refills: 0 | Status: SHIPPED | OUTPATIENT
Start: 2019-05-06 | End: 2019-05-11

## 2019-05-06 ASSESSMENT — MIFFLIN-ST. JEOR: SCORE: 1210.64

## 2019-05-06 NOTE — PATIENT INSTRUCTIONS
Possible gout  Uric acid level pending  Xray showed arthritis, will wait for radiologist reports    Start Prednisone 40 mg daily for 5 days   Ice packs

## 2019-05-06 NOTE — PROGRESS NOTES
"  SUBJECTIVE:   Gabriela Agrawal is a 85 year old female who presents to clinic today for the following   health issues:    Musculoskeletal problem/pain      Duration: 2 weeks, no known injury- came on sudden     Description  Location: right hand     Intensity:  8/10     Accompanying signs and symptoms: swelling     History  Previous similar problem: no   Previous evaluation:  none    Precipitating or alleviating factors:  Trauma or overuse: no   Aggravating factors include: grabbing things, more painful when she picks up things that are heavy     Therapies tried and outcome: Ice, icy hot w/ lidocaine, brace     Additional history: as documented    Reviewed  and updated as needed this visit by clinical staff  Tobacco  Allergies  Meds  Med Hx  Surg Hx  Fam Hx  Soc Hx        Reviewed and updated as needed this visit by Provider         Labs reviewed in EPIC    ROS:  Constitutional, HEENT, cardiovascular, pulmonary, gi and gu systems are negative, except as otherwise noted.    OBJECTIVE:     /58 (BP Location: Left arm, Patient Position: Sitting, Cuff Size: Adult Regular)   Pulse 94   Temp 97.7  F (36.5  C) (Tympanic)   Resp 16   Ht 1.702 m (5' 7\")   Wt 73.3 kg (161 lb 9.6 oz)   SpO2 97%   BMI 25.31 kg/m    Body mass index is 25.31 kg/m .  GENERAL: healthy, alert and no distress  MS: R hand and wrist moderate edema, slightly tender to palpation, limited ROM due to pain.   PSYCH: mentation appears normal, affect normal/bright    Diagnostic Test Results:  Xray - IMPRESSION: Severe degenerative change at the MCP joints of the first,  second, and third digits along with the CMC joint at the base of the  thumb and the triscaphe joint. Less pronounced are multiple levels of  interphalangeal degenerative change. Bone mineralization is normal. No  fracture or dislocation.     ASSESSMENT/PLAN:     1. Hand edema  -gout flare vs osteoarthritis   - XR Hand Right G/E 3 Views; Future  - Uric acid  - predniSONE " (DELTASONE) 10 MG tablet; Take 40 mg by mouth daily for 5 days.  Dispense: 20 tablet; Refill: 0  -ice packs as needed  -ACE wraps  -Tylenol 500 mg 4 times daily as needed     See Patient Instructions    FRANCESCO Kunz [facility] [place]

## 2019-05-07 VITALS
OXYGEN SATURATION: 97 % | DIASTOLIC BLOOD PRESSURE: 58 MMHG | WEIGHT: 161.6 LBS | SYSTOLIC BLOOD PRESSURE: 136 MMHG | RESPIRATION RATE: 16 BRPM | BODY MASS INDEX: 25.36 KG/M2 | HEIGHT: 67 IN | HEART RATE: 94 BPM | TEMPERATURE: 97.7 F

## 2019-05-22 ENCOUNTER — OFFICE VISIT (OUTPATIENT)
Dept: ORTHOPEDICS | Facility: CLINIC | Age: 84
End: 2019-05-22
Payer: MEDICARE

## 2019-05-22 VITALS
WEIGHT: 162 LBS | HEIGHT: 67 IN | BODY MASS INDEX: 25.43 KG/M2 | SYSTOLIC BLOOD PRESSURE: 130 MMHG | DIASTOLIC BLOOD PRESSURE: 62 MMHG

## 2019-05-22 DIAGNOSIS — M19.049 HAND ARTHRITIS: ICD-10-CM

## 2019-05-22 DIAGNOSIS — M19.049 CMC ARTHRITIS: Primary | ICD-10-CM

## 2019-05-22 PROCEDURE — 99213 OFFICE O/P EST LOW 20 MIN: CPT | Performed by: PEDIATRICS

## 2019-05-22 ASSESSMENT — MIFFLIN-ST. JEOR: SCORE: 1212.46

## 2019-05-22 NOTE — PROGRESS NOTES
Sports Medicine Clinic Visit    PCP: Coco Mejia    Gabriela Agrawal is a 85 year old female who is seen  in consultation at the request of  Rachel Cornelius C.N.P. presenting with right hand pain    Injury: She reports right hand pain for 3-4 weeks. She reports no injury. She has seen her PCP and was prescribed prednisone which reduced her pain slightly. She states her pain is in the right writs and thumb. She reports pain increases with gripping objects. She is right hand dominate.    Location of Pain: right thumb and wrist  Duration of Pain: 3-4 weeks   Rating of Pain at worst: 8/10  Rating of Pain Currently: 8/10  Symptoms are better with: Ice, Tylenol, Other medications: prednisone and Rest  Symptoms are worse with: use of hand, gripping  Additional Features:   Positive: swelling and weakness   Negative: bruising, popping, grinding, catching, locking, instability, paresthesias and numbness  Other evaluation and/or treatments so far consists of: Rest and bracingf  Prior History of related problems: nothing    Social History: retired    Review of Systems  Skin: no bruising, yes swelling  Musculoskeletal: as above  Neurologic: no numbness, paresthesias  Remainder of review of systems is negative including constitutional, CV, pulmonary, GI, except as noted in HPI or medical history.    Patient's current problem list, past medical and surgical history, and family history were reviewed.    Patient Active Problem List   Diagnosis     CARDIOVASCULAR SCREENING; LDL GOAL LESS THAN 160     Melanoma (H)     Basal cell carcinoma, lip     Squamous cell carcinoma in situ     History of melanoma     Hyperlipidemia LDL goal <130     History of skin cancer     Seborrheic keratosis     Lentigo     Angioma     Dermal nevus     AK (actinic keratosis)     Advanced directives, counseling/discussion     Essential hypertension, benign     Osteopenia     Serum calcium elevated     Grief reaction     Elevated fasting blood  "sugar     Past Medical History:   Diagnosis Date     Basal cell carcinoma      Bronchiolitis obliterans (H)      Diffuse cystic mastopathy     Bresat Fibrocystic Disease     Disorder of bone and cartilage, unspecified      Malignant melanoma (H)      Nonspecific (abnormal) findings on radiological and other examination of lung field      Other abnormal blood chemistry      Other specified alveolar and parietoalveolar pneumonopathies      Squamous cell carcinoma      Syncope and collapse     2007 holter neg     Past Surgical History:   Procedure Laterality Date     APPENDECTOMY OPEN       BREAST SURGERY      biopsy     COLONOSCOPY  2/8/2013    Procedure: COLONOSCOPY;  Colonoscopy  ;  Surgeon: Norah Whaley MD;  Location: [facility]     EYE SURGERY      keratoplasty     HERNIA REPAIR      left inginal     HYSTERECTOMY      including ovaries     KNEE SURGERY      2010, 2009     LUNG SURGERY      biopsy     melanoma  resection       Family History   Problem Relation Age of Onset     Cancer Other      Family History Negative Other          Objective  /62 (BP Location: Left arm, Patient Position: Chair, Cuff Size: Adult Regular)   Ht 1.702 m (5' 7\")   Wt 73.5 kg (162 lb)   BMI 25.37 kg/m      GENERAL APPEARANCE: healthy, alert and no distress   GAIT: NORMAL  SKIN: no suspicious lesions or rashes  HEENT: Sclera clear, anicteric  CV: good peripheral pulses  RESP: Breathing not labored  NEURO: Normal strength and tone, mentation intact and speech normal  PSYCH:  mentation appears normal and affect normal/bright    Bilateral Wrist and Hand exam  Inspection:       Swelling: right CMC joint       Arthritis deformities bilaterally    Tender:       CMC joint on the right    Non Tender:       Remainder of the Wrist and Hand bilateral    ROM:       Decreased ROM of right wrist and thumb slightly compared to the left    Neurovascular:       2+ radial pulses bilaterally with brisk capillary refill and      normal " sensation to light touch in the radial, median and ulnar nerve distributions      Radiology  I visualized and reviewed these images with the patient  Xr Hand Right G/e 3 Views  Result Date: 5/6/2019  RIGHT HAND THREE VIEWS   5/6/2019 1:44 PM HISTORY: Hand edema. COMPARISON: None.   IMPRESSION: Severe degenerative change at the MCP joints of the first, second, and third digits along with the CMC joint at the base of the thumb and the triscaphe joint. Less pronounced are multiple levels of interphalangeal degenerative change. Bone mineralization is normal. No fracture or dislocation. JARAD LUCAS MD    Assessment:  1. CMC arthritis    2. Hand arthritis      We discussed supportive care of bracing and OTC medications, role of occupational hand therapy, consideration of injections and surgical referral.    Plan:  - Today's Plan of Care:  Rehab: Occupational Therapy: [facility] - [phone]  Medical Equipment: Shown wrist and thumb bracing  Referred for US Guided steroid injection of CMC joint  Continue with relative rest and activity modification, Ice, Compression, and Elevation.  Can apply ice 10-15 minutes  3-4 times per day as needed.    -We also discussed other future treatment options:  Referral to hand surgeon    Follow Up: as needed    Concerning signs and symptoms were reviewed.  The patient expressed understanding of this management plan and all questions were answered at this time.    Thanks for the opportunity to participate in the care of this patient, I will keep you updated on their progress.    CC: Rachel Jennings MD CAQ  Primary Care Sports Medicine  [facility]

## 2019-05-22 NOTE — PATIENT INSTRUCTIONS
Plan:  - Today's Plan of Care:  Rehab: Occupational Therapy: [facility] - [phone]  Medical Equipment: Shown wrist and thumb bracing  Referred for US Guided steroid injection of CMC joint  Continue with relative rest and activity modification, Ice, Compression, and Elevation.  Can apply ice 10-15 minutes  3-4 times per day as needed.    -We also discussed other future treatment options:  Referral to hand surgeon    Follow Up: as needed    If you have any further questions for your physician or physician s care team you can call [phone] and use option 3 to leave a voice message. Calls received during business hours will be returned same day.

## 2019-05-22 NOTE — LETTER
5/22/2019         RE: Gabriela Agrawal  [address] [place] [address]        Dear Colleague,    Thank you for referring your patient, Gabriela Agrawal, to the [facility]. Please see a copy of my visit note below.    Sports Medicine Clinic Visit    PCP: Coco Mejia    Gabriela Agrawal is a 85 year old female who is seen  in consultation at the request of  Rachel Cornelius C.N.P. presenting with right hand pain    Injury: She reports right hand pain for 3-4 weeks. She reports no injury. She has seen her PCP and was prescribed prednisone which reduced her pain slightly. She states her pain is in the right writs and thumb. She reports pain increases with gripping objects. She is right hand dominate.    Location of Pain: right thumb and wrist  Duration of Pain: 3-4 weeks   Rating of Pain at worst: 8/10  Rating of Pain Currently: 8/10  Symptoms are better with: Ice, Tylenol, Other medications: prednisone and Rest  Symptoms are worse with: use of hand, gripping  Additional Features:   Positive: swelling and weakness   Negative: bruising, popping, grinding, catching, locking, instability, paresthesias and numbness  Other evaluation and/or treatments so far consists of: Rest and bracingf  Prior History of related problems: nothing    Social History: retired    Review of Systems  Skin: no bruising, yes swelling  Musculoskeletal: as above  Neurologic: no numbness, paresthesias  Remainder of review of systems is negative including constitutional, CV, pulmonary, GI, except as noted in HPI or medical history.    Patient's current problem list, past medical and surgical history, and family history were reviewed.    Patient Active Problem List   Diagnosis     CARDIOVASCULAR SCREENING; LDL GOAL LESS THAN 160     Melanoma (H)     Basal cell carcinoma, lip     Squamous cell carcinoma in situ     History of melanoma     Hyperlipidemia LDL goal <130     History of skin cancer  "    Seborrheic keratosis     Lentigo     Angioma     Dermal nevus     AK (actinic keratosis)     Advanced directives, counseling/discussion     Essential hypertension, benign     Osteopenia     Serum calcium elevated     Grief reaction     Elevated fasting blood sugar     Past Medical History:   Diagnosis Date     Basal cell carcinoma      Bronchiolitis obliterans (H)      Diffuse cystic mastopathy     Bresat Fibrocystic Disease     Disorder of bone and cartilage, unspecified      Malignant melanoma (H)      Nonspecific (abnormal) findings on radiological and other examination of lung field      Other abnormal blood chemistry      Other specified alveolar and parietoalveolar pneumonopathies      Squamous cell carcinoma      Syncope and collapse     2007 holter neg     Past Surgical History:   Procedure Laterality Date     APPENDECTOMY OPEN       BREAST SURGERY      biopsy     COLONOSCOPY  2/8/2013    Procedure: COLONOSCOPY;  Colonoscopy  ;  Surgeon: Norah Whaley MD;  Location: [facility]     EYE SURGERY      keratoplasty     HERNIA REPAIR      left inginal     HYSTERECTOMY      including ovaries     KNEE SURGERY      2010, 2009     LUNG SURGERY      biopsy     melanoma  resection       Family History   Problem Relation Age of Onset     Cancer Other      Family History Negative Other          Objective  /62 (BP Location: Left arm, Patient Position: Chair, Cuff Size: Adult Regular)   Ht 1.702 m (5' 7\")   Wt 73.5 kg (162 lb)   BMI 25.37 kg/m       GENERAL APPEARANCE: healthy, alert and no distress   GAIT: NORMAL  SKIN: no suspicious lesions or rashes  HEENT: Sclera clear, anicteric  CV: good peripheral pulses  RESP: Breathing not labored  NEURO: Normal strength and tone, mentation intact and speech normal  PSYCH:  mentation appears normal and affect normal/bright    Bilateral Wrist and Hand exam  Inspection:       Swelling: right CMC joint       Arthritis deformities bilaterally    Tender:       CMC " joint on the right    Non Tender:       Remainder of the Wrist and Hand bilateral    ROM:       Decreased ROM of right wrist and thumb slightly compared to the left    Neurovascular:       2+ radial pulses bilaterally with brisk capillary refill and      normal sensation to light touch in the radial, median and ulnar nerve distributions      Radiology  I visualized and reviewed these images with the patient  Xr Hand Right G/e 3 Views  Result Date: 5/6/2019  RIGHT HAND THREE VIEWS   5/6/2019 1:44 PM HISTORY: Hand edema. COMPARISON: None.   IMPRESSION: Severe degenerative change at the MCP joints of the first, second, and third digits along with the CMC joint at the base of the thumb and the triscaphe joint. Less pronounced are multiple levels of interphalangeal degenerative change. Bone mineralization is normal. No fracture or dislocation. JARAD LUCAS MD    Assessment:  1. CMC arthritis    2. Hand arthritis      We discussed supportive care of bracing and OTC medications, role of occupational hand therapy, consideration of injections and surgical referral.    Plan:  - Today's Plan of Care:  Rehab: Occupational Therapy: [facility] - [phone]  Medical Equipment: Shown wrist and thumb bracing  Referred for US Guided steroid injection of CMC joint  Continue with relative rest and activity modification, Ice, Compression, and Elevation.  Can apply ice 10-15 minutes  3-4 times per day as needed.    -We also discussed other future treatment options:  Referral to hand surgeon    Follow Up: as needed    Concerning signs and symptoms were reviewed.  The patient expressed understanding of this management plan and all questions were answered at this time.    Thanks for the opportunity to participate in the care of this patient, I will keep you updated on their progress.    CC: Rachel Jennings MD CAQ  Primary Care Sports Medicine  [facility]    Again,  thank you for allowing me to participate in the care of your patient.        Sincerely,        Cira Jennings MD

## 2019-05-30 ENCOUNTER — OFFICE VISIT (OUTPATIENT)
Dept: ORTHOPEDICS | Facility: CLINIC | Age: 84
End: 2019-05-30
Payer: MEDICARE

## 2019-05-30 VITALS
WEIGHT: 162 LBS | SYSTOLIC BLOOD PRESSURE: 128 MMHG | BODY MASS INDEX: 25.43 KG/M2 | HEIGHT: 67 IN | DIASTOLIC BLOOD PRESSURE: 76 MMHG

## 2019-05-30 DIAGNOSIS — M18.11 PRIMARY OSTEOARTHRITIS OF FIRST CARPOMETACARPAL JOINT OF RIGHT HAND: Primary | ICD-10-CM

## 2019-05-30 PROCEDURE — 20604 DRAIN/INJ JOINT/BURSA W/US: CPT | Mod: RT | Performed by: FAMILY MEDICINE

## 2019-05-30 RX ORDER — ROPIVACAINE HYDROCHLORIDE 5 MG/ML
1 INJECTION, SOLUTION EPIDURAL; INFILTRATION; PERINEURAL
Status: DISCONTINUED | OUTPATIENT
Start: 2019-05-30 | End: 2020-06-08

## 2019-05-30 RX ORDER — TRIAMCINOLONE ACETONIDE 40 MG/ML
40 INJECTION, SUSPENSION INTRA-ARTICULAR; INTRAMUSCULAR
Status: DISCONTINUED | OUTPATIENT
Start: 2019-05-30 | End: 2020-06-08

## 2019-05-30 RX ADMIN — TRIAMCINOLONE ACETONIDE 40 MG: 40 INJECTION, SUSPENSION INTRA-ARTICULAR; INTRAMUSCULAR at 14:35

## 2019-05-30 RX ADMIN — ROPIVACAINE HYDROCHLORIDE 1 ML: 5 INJECTION, SOLUTION EPIDURAL; INFILTRATION; PERINEURAL at 14:35

## 2019-05-30 ASSESSMENT — MIFFLIN-ST. JEOR: SCORE: 1212.46

## 2019-05-30 NOTE — PROGRESS NOTES
Gabriela Agrawal  :  1933  DOS: May 30, 2019  MRN: [id number]    Sports Medicine Clinic Procedure    Ultrasound Guided Right Intra-Articular Thumb CMC Joint Injection    Clinical History: She reports right hand pain for 3-4 weeks. She reports no injury. She has seen her PCP and was prescribed prednisone which reduced her pain slightly. She states her pain is in the right writs and thumb. She reports pain increases with gripping objects. She is right hand dominate.    Diagnosis:   1. Primary osteoarthritis of first carpometacarpal joint of right hand      Referring Physician: Cira Jennings MD  Hand / Upper Extremity Injection/Arthrocentesis: R thumb CMC  Date/Time: 2019 2:35 PM  Performed by: Cristiano Lambert DO  Authorized by: Cristiano Lambert DO     Indications:  Pain  Needle Size:  25 G  Guidance: ultrasound    Approach:  Radial  Condition: osteoarthritis    Location:  Thumb  Site:  R thumb CMC    Medications:  40 mg triamcinolone 40 MG/ML; 1 mL ropivacaine 5 MG/ML  Outcome:  Tolerated well, no immediate complications  Procedure discussed: discussed risks, benefits, and alternatives    Consent Given by:  Patient  Prep: patient was prepped and draped in usual sterile fashion        Impression:  Successful Right CMC joint injection.    Plan:  Follow up as directed with Dr Jennings  Good initial relief from anesthetic effect  Expectations and goals of CSI reviewed  Often 2-3 days for steroid effect, and can take up to two weeks for maximum effect  We discussed modified progressive pain-free activity as tolerated  Do not overuse in first two weeks if feeling better due to concern for vulnerability while steroid is working  Supportive care reviewed  All questions were answered today  Contact us with additional questions or concerns  Signs and sx of concern reviewed         Cristiano Lambert DO, CAQ  Primary Care Sports Medicine  [facility]

## 2019-08-30 ENCOUNTER — HOSPITAL ENCOUNTER (OUTPATIENT)
Dept: MAMMOGRAPHY | Facility: CLINIC | Age: 84
Discharge: HOME | End: 2019-08-30
Attending: INTERNAL MEDICINE | Admitting: INTERNAL MEDICINE
Payer: MEDICARE

## 2019-08-30 DIAGNOSIS — Z12.31 VISIT FOR SCREENING MAMMOGRAM: ICD-10-CM

## 2019-08-30 PROCEDURE — 77067 SCR MAMMO BI INCL CAD: CPT

## 2020-02-06 DIAGNOSIS — I10 ESSENTIAL HYPERTENSION, BENIGN: ICD-10-CM

## 2020-02-06 DIAGNOSIS — E78.5 HYPERLIPIDEMIA LDL GOAL <130: ICD-10-CM

## 2020-02-06 NOTE — LETTER
[facility]  [address] [address]  Phone: [phone]    February 7, 2020    Gabriela Agrawal                                                                                                                    [address] [facility] [address] [place] [address]            Dear Ms. Agrawal,    We are concerned about your health care.  We recently provided you with a medication refill.  Many medications require routine follow-up with your Doctor.      At this time we ask that: You schedule a routine office visit with your physician to follow your Hyperlipidemia and Hypertension.     Your prescription: Has been refilled for 3 months so you may have time for the above noted follow-up. Please be seen prior to needing your next refill of medication.       Thank you,      Coco Mejia,  / mm[facility]

## 2020-02-06 NOTE — TELEPHONE ENCOUNTER
"Requested Prescriptions   Pending Prescriptions Disp Refills     losartan (COZAAR) 100 MG tablet  Last Written Prescription Date:  12/19/18  Last Fill Quantity: 90,  # refills: 3   [place] [facility] [place] [facility] or [facility] prescribing provider:  5/6/19   Future Office Visit:      90 tablet 3     Sig: Take 1 tablet (100 mg) by mouth daily       Angiotensin-II Receptors Failed - 2/6/2020  9:27 AM        Failed - Normal serum creatinine on file in past 12 months     Recent Labs   Lab Test 01/07/19  0953   CR 0.84             Failed - Normal serum potassium on file in past 12 months     Recent Labs   Lab Test 01/07/19  0953   POTASSIUM 4.3                    Passed - Last blood pressure under 140/90 in past 12 months     BP Readings from Last 3 Encounters:   05/30/19 128/76   05/22/19 130/62   05/06/19 136/58                 Passed - Recent (12 mo) or future (30 days) visit within the authorizing provider's specialty     Patient has had an office visit with the authorizing provider or a provider within the authorizing providers department within the previous 12 mos or has a future within next 30 days. See \"Patient Info\" tab in inbasket, or \"Choose Columns\" in Meds & Orders section of the refill encounter.              Passed - Medication is active on med list        Passed - Patient is age 18 or older        Passed - No active pregnancy on record        Passed - No positive pregnancy test in past 12 months        lovastatin (MEVACOR) 20 MG tablet  Last Written Prescription Date:  12/19/18  Last Fill Quantity: 90,  # refills: 3   [place] [facility] [place] [facility] or [facility] prescribing provider:  5/6/19   Future Office Visit:      90 tablet 3     Sig: Take 1 tablet (20 mg) by mouth At Bedtime       Statins Protocol Failed - 2/6/2020  9:27 AM        Failed - LDL on file in past 12 months     Recent Labs   Lab Test 01/07/19  0953   LDL 77             Passed - No abnormal creatine kinase in past 12 months     No lab " "results found.             Passed - Recent (12 mo) or future (30 days) visit within the authorizing provider's specialty     Patient has had an office visit with the authorizing provider or a provider within the authorizing providers department within the previous 12 mos or has a future within next 30 days. See \"Patient Info\" tab in inbasket, or \"Choose Columns\" in Meds & Orders section of the refill encounter.              Passed - Medication is active on med list        Passed - Patient is age 18 or older        Passed - No active pregnancy on record        Passed - No positive pregnancy test in past 12 months          "

## 2020-02-07 RX ORDER — LOSARTAN POTASSIUM 100 MG/1
100 TABLET ORAL DAILY
Qty: 90 TABLET | Refills: 0 | Status: SHIPPED | OUTPATIENT
Start: 2020-02-07 | End: 2020-03-03

## 2020-02-07 RX ORDER — LOVASTATIN 20 MG
20 TABLET ORAL AT BEDTIME
Qty: 90 TABLET | Refills: 0 | Status: SHIPPED | OUTPATIENT
Start: 2020-02-07 | End: 2020-03-03

## 2020-02-17 ENCOUNTER — TELEPHONE (OUTPATIENT)
Dept: INTERNAL MEDICINE | Facility: CLINIC | Age: 85
End: 2020-02-17

## 2020-02-17 NOTE — TELEPHONE ENCOUNTER
Reason for Call:  Mothers upcoming appt.    Detailed comments: patients son Kendall is calling to give us information for his mothers upcoming appt. She is getting forgetful and it has become worse. She also repeats herself more and more during a conversation. Son is wondering where his Mom is at with this issue after the appt and also what is there to do for this. Please advise. He wanted this information to Dr. Mejia, as his Mother will not offer this information.    Phone Number Patient can be reached at:   [phone]    Best Time: any    Can we leave a detailed message on this number? YES   [facility] [place]       Call taken on 2/17/2020 at 2:24 PM by Hiral Foster

## 2020-02-17 NOTE — TELEPHONE ENCOUNTER
Patient has annual exam on 3/3/20.  Son of patient is calling in to update Dr. Mejia with some information that patient will not be forthcoming about:  She is getting forgetful and it has become worse. She also repeats herself more and more during a conversation.  This has been progressively getting worse over the last year.  Son of patient reports that he will answer the same questions several times a day.  Patient lives alone and they are concerned for patients safety with her memory fading.  Patient will allow her son, Kendall, to drive her to appointments and errands.  Kendall will be attending the upcoming appt with patient, he will be sitting in the lobby.  There is not a consent to communicate on the chart, he is wondering if PCP feels this would be a recommendation for patient to have a consent to communicate completed.    Routing to provider for update    Cleopatra AYERS Rn

## 2020-02-18 NOTE — TELEPHONE ENCOUNTER
Patient's son notified that Dr. Mejia is aware of his concerns. He will bring copy of updated Health Care Directive.      VALERIA KimN, RN

## 2020-02-18 NOTE — TELEPHONE ENCOUNTER
Block my same day appointment for Gabriela to have 40 min visit.  We can encourage her to complete a consent to communicate at the time of the visit.  I can complete a longer memory test with the extra time.  Son can encourage his mother to let him attend visit, not just wait in lobby.    Glad son let me know

## 2020-03-03 ENCOUNTER — OFFICE VISIT (OUTPATIENT)
Dept: FAMILY MEDICINE | Facility: CLINIC | Age: 85
End: 2020-03-03
Payer: MEDICARE

## 2020-03-03 VITALS
HEIGHT: 67 IN | OXYGEN SATURATION: 97 % | RESPIRATION RATE: 18 BRPM | BODY MASS INDEX: 25.31 KG/M2 | HEART RATE: 89 BPM | WEIGHT: 161.3 LBS | TEMPERATURE: 98 F | SYSTOLIC BLOOD PRESSURE: 168 MMHG | DIASTOLIC BLOOD PRESSURE: 90 MMHG

## 2020-03-03 DIAGNOSIS — Z00.00 ENCOUNTER FOR MEDICARE ANNUAL WELLNESS EXAM: Primary | ICD-10-CM

## 2020-03-03 DIAGNOSIS — I10 ESSENTIAL HYPERTENSION, BENIGN: ICD-10-CM

## 2020-03-03 DIAGNOSIS — R41.3 MEMORY LOSS: ICD-10-CM

## 2020-03-03 DIAGNOSIS — M85.89 OSTEOPENIA OF MULTIPLE SITES: ICD-10-CM

## 2020-03-03 DIAGNOSIS — E78.5 HYPERLIPIDEMIA LDL GOAL <130: ICD-10-CM

## 2020-03-03 LAB
ANION GAP SERPL CALCULATED.3IONS-SCNC: 4 MMOL/L (ref 3–14)
BUN SERPL-MCNC: 7 MG/DL (ref 7–30)
CALCIUM SERPL-MCNC: 9.4 MG/DL (ref 8.5–10.1)
CHLORIDE SERPL-SCNC: 106 MMOL/L (ref 94–109)
CHOLEST SERPL-MCNC: 180 MG/DL
CO2 SERPL-SCNC: 27 MMOL/L (ref 20–32)
CREAT SERPL-MCNC: 0.82 MG/DL (ref 0.52–1.04)
ERYTHROCYTE [DISTWIDTH] IN BLOOD BY AUTOMATED COUNT: 14.7 % (ref 10–15)
GFR SERPL CREATININE-BSD FRML MDRD: 65 ML/MIN/{1.73_M2}
GLUCOSE SERPL-MCNC: 107 MG/DL (ref 70–99)
HCT VFR BLD AUTO: 48.1 % (ref 35–47)
HDLC SERPL-MCNC: 67 MG/DL
HGB BLD-MCNC: 15.5 G/DL (ref 11.7–15.7)
LDLC SERPL CALC-MCNC: 90 MG/DL
MCH RBC QN AUTO: 28.7 PG (ref 26.5–33)
MCHC RBC AUTO-ENTMCNC: 32.2 G/DL (ref 31.5–36.5)
MCV RBC AUTO: 89 FL (ref 78–100)
NONHDLC SERPL-MCNC: 113 MG/DL
PLATELET # BLD AUTO: 349 10E9/L (ref 150–450)
POTASSIUM SERPL-SCNC: 4 MMOL/L (ref 3.4–5.3)
RBC # BLD AUTO: 5.4 10E12/L (ref 3.8–5.2)
SODIUM SERPL-SCNC: 137 MMOL/L (ref 133–144)
TRIGL SERPL-MCNC: 113 MG/DL
TSH SERPL DL<=0.005 MIU/L-ACNC: 1.44 MU/L (ref 0.4–4)
WBC # BLD AUTO: 8.5 10E9/L (ref 4–11)

## 2020-03-03 PROCEDURE — 84443 ASSAY THYROID STIM HORMONE: CPT | Performed by: INTERNAL MEDICINE

## 2020-03-03 PROCEDURE — G0439 PPPS, SUBSEQ VISIT: HCPCS | Performed by: INTERNAL MEDICINE

## 2020-03-03 PROCEDURE — 80048 BASIC METABOLIC PNL TOTAL CA: CPT | Performed by: INTERNAL MEDICINE

## 2020-03-03 PROCEDURE — 99214 OFFICE O/P EST MOD 30 MIN: CPT | Mod: 25 | Performed by: INTERNAL MEDICINE

## 2020-03-03 PROCEDURE — 85027 COMPLETE CBC AUTOMATED: CPT | Performed by: INTERNAL MEDICINE

## 2020-03-03 PROCEDURE — 82607 VITAMIN B-12: CPT | Performed by: INTERNAL MEDICINE

## 2020-03-03 PROCEDURE — 36415 COLL VENOUS BLD VENIPUNCTURE: CPT | Performed by: INTERNAL MEDICINE

## 2020-03-03 PROCEDURE — 80061 LIPID PANEL: CPT | Performed by: INTERNAL MEDICINE

## 2020-03-03 RX ORDER — LOSARTAN POTASSIUM 100 MG/1
100 TABLET ORAL DAILY
Qty: 90 TABLET | Refills: 3 | Status: SHIPPED | OUTPATIENT
Start: 2020-03-03 | End: 2021-04-01

## 2020-03-03 RX ORDER — LOVASTATIN 20 MG
20 TABLET ORAL AT BEDTIME
Qty: 90 TABLET | Refills: 3 | Status: SHIPPED | OUTPATIENT
Start: 2020-03-03 | End: 2020-06-09

## 2020-03-03 ASSESSMENT — MIFFLIN-ST. JEOR: SCORE: 1203.15

## 2020-03-03 NOTE — PROGRESS NOTES
"  SUBJECTIVE:   Gabriela Agrawal is a 86 year old female who presents for Preventive Visit.    Patient is here with her son Kendall today    Are you in the first 12 months of your Medicare Part B coverage?  No    Physical Health:    In general, how would you rate your overall physical health? good    Outside of work, how many days during the week do you exercise? 6-7 days/week    Outside of work, approximately how many minutes a day do you exercise?15-30 minutes    If you drink alcohol do you typically have >3 drinks per day or >7 drinks per week? No    Do you usually eat at least 4 servings of fruit and vegetables a day, include whole grains & fiber and avoid regularly eating high fat or \"junk\" foods? Yes    Do you have any problems taking medications regularly?  No    Do you have any side effects from medications? none    Needs assistance for the following daily activities: transportation and housework    Which of the following safety concerns are present in your home?  lack of grab bars in the bathroom     Hearing impairment: No    In the past 6 months, have you been bothered by leaking of urine? no    Mental Health:    In general, how would you rate your overall mental or emotional health? good  PHQ-2 Score:      Do you feel safe in your environment? Yes    Have you ever done Advance Care Planning? (For example, a Health Directive, POLST, or a discussion with a medical provider or your loved ones about your wishes): Yes, patient states has an Advance Care Planning document and will bring a copy to the clinic.    Additional concerns to address?  No    Fall risk:  Fallen 2 or more times in the past year?: No  Any fall with injury in the past year?: No    Cognitive Screenin) Repeat 3 items (Leader, Season, Table)    2) Clock draw: NORMAL  3) 3 item recall: Recalls 3 objects  Results: NORMAL clock, 1-2 items recalled: COGNITIVE IMPAIRMENT LESS LIKELY    Mini-CogTM Copyright S Tony. Licensed by the author for use in " "[facility]; reprinted with permission (chidi@[facility]). All rights reserved.        Memory Loss Concern:  Telephone call on 2/17/20  \"Patient has annual exam on 3/3/20.  Son of patient is calling in to update Dr. Mejia with some information that patient will not be forthcoming about: She is getting forgetful and it has become worse. She also repeats herself more and more during a conversation. This has been progressively getting worse over the last year.  Son of patient reports that he will answer the same questions several times a day. Patient lives alone and they are concerned for patients safety with her memory fading.  Patient will allow her son, Kendall, to drive her to appointments and errands.  Kendall will be attending the upcoming appt with patient, he will be sitting in the lobby.  There is not a consent to communicate on the chart, he is wondering if PCP feels this would be a recommendation for patient to have a consent to communicate completed.\"  --she lives independently.   --family wants to make sure she is still safe to do so.  They are unsure what is 'normal part of aging' and what is pathologic.  --difficulty completing her taxes, which is not like her.  Last year she completed her taxes herself.  This year, they had someone do it for her.  She had trouble signing the forms.  --repetitive story telling, or reminding of tasks, events, etc.    Do you have sleep apnea, excessive snoring or daytime drowsiness?: no        Hyperlipidemia Follow-Up      Are you regularly taking any medication or supplement to lower your cholesterol?   Yes- lovastatin (MEVACOR) 20 MG tablet    Are you having muscle aches or other side effects that you think could be caused by your cholesterol lowering medication?  No    Hypertension Follow-up      Do you check your blood pressure regularly outside of the clinic? No     Are you following a low salt diet? Yes    Are your blood pressures ever more than 140 on the top " number (systolic) OR more   than 90 on the bottom number (diastolic), for example 140/90? No      Reviewed and updated as needed this visit by clinical staff  Tobacco  Allergies  Meds         Reviewed and updated as needed this visit by Provider        Social History     Tobacco Use     Smoking status: Former Smoker     Packs/day: 1.50     Years: 22.50     Pack years: 33.75     Last attempt to quit: 1982     Years since quittin.1     Smokeless tobacco: Never Used   Substance Use Topics     Alcohol use: Yes     Comment: 4-6 a week                           Current providers sharing in care for this patient include:   Patient Care Team:  Coco Mejia DO as PCP - General (Internal Medicine)  Rachel Cornelius APRN CNP as Assigned PCP    The following health maintenance items are reviewed in Epic and correct as of today:  Health Maintenance   Topic Date Due     SKIN CANCER SCREENING  1933     ZOSTER IMMUNIZATION (2 of 3) 2016     ADVANCE CARE PLANNING  2019     MEDICARE ANNUAL WELLNESS VISIT  2019     FALL RISK ASSESSMENT  2019     PHQ-2  2020     DTAP/TDAP/TD IMMUNIZATION (2 - Td) 02/10/2021     INFLUENZA VACCINE  Completed     PNEUMOCOCCAL IMMUNIZATION 65+ LOW/MEDIUM RISK  Completed     IPV IMMUNIZATION  Aged Out     MENINGITIS IMMUNIZATION  Aged Out     Current Outpatient Medications   Medication Sig Dispense Refill     aspirin 81 MG tablet Take 1 tablet by mouth daily.  3     Calcium Carbonate-Vitamin D (CALCIUM + D PO) Take 1 tablet by mouth daily.       cholecalciferol (VITAMIN D) 1000 UNIT tablet Take 1 tablet by mouth daily  100 tablet 12     Coenzyme Q10 (CO Q-10 PO) Take by mouth daily       Cyanocobalamin (VITAMIN B-12 PO) Take 1 tablet by mouth daily       losartan (COZAAR) 100 MG tablet Take 1 tablet (100 mg) by mouth daily 90 tablet 0     lovastatin (MEVACOR) 20 MG tablet Take 1 tablet (20 mg) by mouth At Bedtime 90 tablet 0     Magnesium  "Oxide, [id number], (MAG-200 PO) Take 1 tablet by mouth daily.       MULTI-VITAMIN OR TABS 1 TABLET DAILY       order for DME Equipment being ordered: gamekeeper brace 1 Device 0     OVER-THE-COUNTER Tumeric-Curcumen 1 capsule daily , unknown dose       VITAMIN C OR 1 TABLET DAILY           ROS:  Constitutional, HEENT, cardiovascular, pulmonary, GI, , musculoskeletal, neuro, skin, endocrine and psych systems are negative, except as otherwise noted.    OBJECTIVE:   BP (!) 162/78 (BP Location: Right arm, Patient Position: Sitting, Cuff Size: Adult Regular)   Pulse 89   Temp 98  F (36.7  C) (Tympanic)   Resp 18   Ht 1.7 m (5' 6.93\")   Wt 73.2 kg (161 lb 4.8 oz)   SpO2 97%   BMI 25.32 kg/m   Estimated body mass index is 25.32 kg/m  as calculated from the following:    Height as of this encounter: 1.7 m (5' 6.93\").    Weight as of this encounter: 73.2 kg (161 lb 4.8 oz).  EXAM:   GENERAL: healthy, alert and no distress  EYES: Eyes grossly normal to inspection, PERRL and conjunctivae and sclerae normal  HENT: ear canals and TM's normal, nose and mouth without ulcers or lesions  NECK: no adenopathy, no asymmetry, masses, or scars and thyroid normal to palpation  RESP: lungs clear to auscultation - no rales, rhonchi or wheezes  CV: regular rate and rhythm, normal S1 S2, no S3 or S4, no murmur, click or rub, no peripheral edema and peripheral pulses strong  ABDOMEN: soft, nontender, no hepatosplenomegaly, no masses and bowel sounds normal  MS: no gross musculoskeletal defects noted, no edema  SKIN: no suspicious lesions or rashes  NEURO: Normal strength and tone, sensory exam grossly normal, mentation intact, speech normal, cranial nerves 2-12 intact, DTR's normal and symmetric , gait normal including heel/toe/tandem walking    MOCA 14/30      PSYCH: mentation appears normal, affect normal/bright      ASSESSMENT / PLAN:   1. Encounter for Medicare annual wellness exam    2. Essential hypertension, benign - patient " "reports degree of white coat hypertension.  RN blood pressure check in 2-4 weeks if not improved on recheck.  Fill med  - OFFICE/OUTPT VISIT,EST,LEVL IV  - losartan (COZAAR) 100 MG tablet; Take 1 tablet (100 mg) by mouth daily  Dispense: 90 tablet; Refill: 3  - Basic metabolic panel  - CBC with platelets    3. Hyperlipidemia LDL goal <130 - may consider stopping statin if MRI does not show significant vascular disease - polypharmacy and uncertain benefit in advanced age  - OFFICE/OUTPT VISIT,EST,LEVL IV  - lovastatin (MEVACOR) 20 MG tablet; Take 1 tablet (20 mg) by mouth At Bedtime  Dispense: 90 tablet; Refill: 3  - Lipid panel reflex to direct LDL Non-fasting    4. Memory loss - bulk of visit spent discussing this.  MOCA abnormal, suspect Alzheimers.  Work-up as below, get NEuropsych testing and follow-up with me after to review results in more detail  - MR Brain w/o & w Contrast; Future  - NEUROPSYCHOLOGY REFERRAL  - TSH with free T4 reflex  - Vitamin B12  - CBC with platelets    5. Osteopenia of multiple sites - due for follow-up.  If the DEXA shows advanced osteoporosis, may consider starting medication.  If only mild osteoporosis, the risk likely outweighs the benefit  - DX Hip/Pelvis/Spine; Future    COUNSELING:  Reviewed preventive health counseling, as reflected in patient instructions    Estimated body mass index is 25.32 kg/m  as calculated from the following:    Height as of this encounter: 1.7 m (5' 6.93\").    Weight as of this encounter: 73.2 kg (161 lb 4.8 oz).         reports that she quit smoking about 38 years ago. She has a 33.75 pack-year smoking history. She has never used smokeless tobacco.      Appropriate preventive services were discussed with this patient, including applicable screening as appropriate for cardiovascular disease, diabetes, osteopenia/osteoporosis, and glaucoma.  As appropriate for age/gender, discussed screening for colorectal cancer, prostate cancer, breast cancer, and " cervical cancer. Checklist reviewing preventive services available has been given to the patient.    Reviewed patients plan of care and provided an AVS. The Complex Care Plan (for patients with higher acuity and needing more deliberate coordination of services) for Gabriela meets the Care Plan requirement. This Care Plan has been established and reviewed with the Patient and son.    Counseling Resources:  ATP IV Guidelines  Pooled Cohorts Equation Calculator  Breast Cancer Risk Calculator  FRAX Risk Assessment  ICSI Preventive Guidelines  Dietary Guidelines for Americans, 2010  USDA's MyPlate  ASA Prophylaxis  Lung CA Screening    Coco Mejia DO  [facility]

## 2020-03-03 NOTE — LETTER
March 3, 2020      Gabriela Agrawal  [address] [id number]        Dear ,    We are writing to inform you of your test results.  Cholesterol looks similar to 1 year ago.  Thyroid is normal. Kidney function, nonfasting blood sugar, electrolytes are normal.   Blood count was normal     Resulted Orders   Basic metabolic panel   Result Value Ref Range    Sodium 137 133 - 144 mmol/L    Potassium 4.0 3.4 - 5.3 mmol/L    Chloride 106 94 - 109 mmol/L    Carbon Dioxide 27 20 - 32 mmol/L    Anion Gap 4 3 - 14 mmol/L    Glucose 107 (H) 70 - 99 mg/dL      Comment:      Fasting specimen    Urea Nitrogen 7 7 - 30 mg/dL    Creatinine 0.82 0.52 - 1.04 mg/dL    GFR Estimate 65 >60 mL/min/[1.73_m2]      Comment:      Non  GFR Calc  Starting 12/18/2018, serum creatinine based estimated GFR (eGFR) will be   calculated using the Chronic Kidney Disease Epidemiology Collaboration   (CKD-EPI) equation.      GFR Estimate If Black 75 >60 mL/min/[1.73_m2]      Comment:       GFR Calc  Starting 12/18/2018, serum creatinine based estimated GFR (eGFR) will be   calculated using the Chronic Kidney Disease Epidemiology Collaboration   (CKD-EPI) equation.      Calcium 9.4 8.5 - 10.1 mg/dL   Lipid panel reflex to direct LDL Non-fasting   Result Value Ref Range    Cholesterol 180 <200 mg/dL    Triglycerides 113 <150 mg/dL      Comment:      Fasting specimen    HDL Cholesterol 67 >49 mg/dL    LDL Cholesterol Calculated 90 <100 mg/dL      Comment:      Desirable:       <100 mg/dl    Non HDL Cholesterol 113 <130 mg/dL   TSH with free T4 reflex   Result Value Ref Range    TSH 1.44 0.40 - 4.00 mU/L   CBC with platelets   Result Value Ref Range    WBC 8.5 4.0 - 11.0 10e9/L    RBC Count 5.40 (H) 3.8 - 5.2 10e12/L    Hemoglobin 15.5 11.7 - 15.7 g/dL    Hematocrit 48.1 (H) 35.0 - 47.0 %    MCV 89 78 - 100 fl    MCH 28.7 26.5 - 33.0 pg    MCHC 32.2 31.5 - 36.5 g/dL    RDW 14.7 10.0 - 15.0 %     Platelet Count 349 150 - 450 10e9/L       If you have any questions or concerns, please call the clinic at the number listed above.       Sincerely,        Coco Mejia, DO

## 2020-03-03 NOTE — PATIENT INSTRUCTIONS
Patient Education   Personalized Prevention Plan  You are due for the preventive services outlined below.  Your care team is available to assist you in scheduling these services.  If you have already completed any of these items, please share that information with your care team to update in your medical record.  Health Maintenance Due   Topic Date Due     Skin Cancer Screening  08/08/1933     Zoster (Shingles) Vaccine (2 of 3) 12/22/2016     Discuss Advance Care Planning  09/04/2019     Annual Wellness Visit  12/19/2019     FALL RISK ASSESSMENT  12/19/2019     PHQ-2  01/01/2020         1. You have Osteopenia - low bone density that can lead to osteoporosis, or brittle bones.  It is recommend to have 1200 mg of calcium per day, and at least 800 units of Vitamin D per day.  You can achieve this with a combination pill of Calcium 600 mg+ Vitamin D  400 units twice daily.  You should walk 30 minutes per day and consider light weight lifting.  We should recheck the bone density test in 3 years.  2. Your blood pressure was elevated today.  Please come back to clinic in 2 weeks for (free) Nurse visit to recheck blood pressure.  Please make appointment at the  on your way out today.  3. Refills  4. Labs today  5. Radiology test was ordered MRI brain and bone density scan.  Please call [phone] to schedule.  6. Neuropsych testing  7. Consider stopping all meds except Calcium, Vitamin D and Losartan.  Will determine what to do with the lovastatin depending on MRI

## 2020-03-04 LAB — VIT B12 SERPL-MCNC: 3719 PG/ML (ref 193–986)

## 2020-03-10 ENCOUNTER — HOSPITAL ENCOUNTER (OUTPATIENT)
Dept: MRI IMAGING | Facility: CLINIC | Age: 85
Discharge: HOME | End: 2020-03-10
Attending: INTERNAL MEDICINE | Admitting: INTERNAL MEDICINE
Payer: MEDICARE

## 2020-03-10 DIAGNOSIS — R41.3 MEMORY LOSS: ICD-10-CM

## 2020-03-10 PROCEDURE — 70553 MRI BRAIN STEM W/O & W/DYE: CPT

## 2020-03-10 PROCEDURE — A9585 GADOBUTROL INJECTION: HCPCS | Performed by: INTERNAL MEDICINE

## 2020-03-10 PROCEDURE — 25500064 ZZH RX 255 OP 636: Performed by: INTERNAL MEDICINE

## 2020-03-10 RX ORDER — GADOBUTROL 604.72 MG/ML
7 INJECTION INTRAVENOUS ONCE
Status: COMPLETED | OUTPATIENT
Start: 2020-03-10 | End: 2020-03-10

## 2020-03-10 RX ADMIN — GADOBUTROL 7 ML: 604.72 INJECTION INTRAVENOUS at 14:31

## 2020-03-10 NOTE — RESULT ENCOUNTER NOTE
The MRI of the brain is essentially normal.  There is a moderate amount of shrinkage of the brain.  This occurs in all people over time, but is slightly more than expected for your age.  There are other microscopic changes in the brain consistent with your known high blood pressure and high cholesterol.  There is no tumor, mass, stroke which is all good news.  Continue plan of care discussed the time of visit.

## 2020-03-13 ENCOUNTER — DOCUMENTATION ONLY (OUTPATIENT)
Dept: OTHER | Facility: CLINIC | Age: 85
End: 2020-03-13

## 2020-05-26 ENCOUNTER — TELEPHONE (OUTPATIENT)
Dept: NEUROPSYCHOLOGY | Facility: CLINIC | Age: 85
End: 2020-05-26

## 2020-05-26 NOTE — TELEPHONE ENCOUNTER
Spoke with Pt's son about Pt's appt on 6/1 at 12:30PM. Appt was changed to video visit and son stated he would be around to help facilitate video visit.

## 2020-06-01 ENCOUNTER — VIRTUAL VISIT (OUTPATIENT)
Dept: NEUROPSYCHOLOGY | Facility: CLINIC | Age: 85
End: 2020-06-01
Attending: INTERNAL MEDICINE
Payer: MEDICARE

## 2020-06-01 DIAGNOSIS — F32.A DEPRESSION, UNSPECIFIED DEPRESSION TYPE: ICD-10-CM

## 2020-06-01 DIAGNOSIS — R41.3 MEMORY LOSS: ICD-10-CM

## 2020-06-01 DIAGNOSIS — F03.90 SENILE DEMENTIA, UNCOMPLICATED (H): Primary | ICD-10-CM

## 2020-06-01 NOTE — LETTER
"2020       RE: Gabriela Agrawal  [address] [facility] [place] [address]     Dear Colleague,    Thank you for referring your patient, Gabriela Agrawal, to the [facility] NEUROPSYCHOLOGY at [facility]. Please see a copy of my visit note below.    Gabriela Agrawal is a 86 year old female who is being evaluated via a billable video visit.      The patient has been notified of following:     \"This video visit will be conducted via a call between you and your physician/provider. We have found that certain health care needs can be provided without the need for an in-person physical exam.  This service lets us provide the care you need with a video conversation. Video visits are billed at different rates depending on your insurance coverage.  Please reach out to your insurance provider with any questions. If during the course of the call the physician/provider feels a video visit is not appropriate, you will not be charged for this service.\"    Patient has given verbal consent for Video visit? Yes    Patient would like the video invitation sent by: Send to e-mail at: [email or web address]    Will anyone else be joining your video visit? No    Video-Visit Details    Type of service:  Video Visit    Video Start Time: 12:30 PM  Video End Time: 12:51 PM  Formal Testin:53-2:36 PM    Originating Location (pt. Location): Home    Distant Location (provider location):  [facility] NEUROPSYCHOLOGY     Platform used for Video Visit: [facility]   RE: Gabriela Agrawal  MR#: [id number]  : 1933  DOS: 2020  GENEVIEVE:  2020    Neuropsychology Laboratory  [address] [address] [phone]    NEUROPSYCHOLOGICAL CONSULTATION      REASON FOR REFERRAL:  Gabriela Agrawal is a 86 year old female with 12 years of education who was referred for a neuropsychological evaluation by Dr. Mejia to assess her cognitive and emotional functioning secondary to " Pt has not called back, no further attempts to be made.    Leela Mohan RN Specialty Triage 3/20/2020 12:11 PM     memory difficulties. The evaluation was requested in order to document her current level of functioning, assist with the differential diagnosis and provide appropriate recommendations to the patient, family and treatment team. The patient was informed that the evaluation included multiple measures of performance and symptom validity, and she was encouraged to provide her best effort at all times.  The nature of the neuropsychological evaluation was also discussed, including limits of confidentiality (for suspected child or elder abuse, potential homicide or suicide, and court orders). The patient was also informed that the report would be placed on the electronic medical records system. The patient was also given an opportunity to ask questions. The patient indicated that she understood the information and consented to participate in the evaluation.    Due to circumstances that prevent in-person clinical visits, this assessment was conducted using telehealth methods (including remote audiovisual presentation of test instructions and test stimuli). The standard administration of these procedures involves in-person, face-to-face methods. The impact of applying non-standard administration methods has been evaluated only in part by scientific research. While every effort was made to simulate standard assessment practices, the diagnostic conclusions and recommendations for treatment provided in this report are being advanced with these reservations.    PROCEDURES:   Review of records and clinical interview  Mental Status-orientation, Wide Range Achievement Test-4, Wechsler Adult Intelligence Scale-IV, Caro Verbal Learning Test-Revised, Clock Drawing Test, Verbal Fluency Test, Geriatric Depression Scale, Worthy Anxiety Inventory, ILS Health and Safety Questions, BDAE Complex Ideational Material, RBANS, Oral Trailmaking Test, Test of Sustained Attention and Tracking and Jasper Naming Test (15 Item version)    REVIEW OF  RECORDS: Medical records from 3/3/2020 noted that the patient s son called  with some information that patient will not be forthcoming about: She is getting forgetful and it has become worse. She also repeats herself more and more during a conversation. This has been progressively getting worse over the last year.  Son of patient reports that he will answer the same questions several times a day. Patient lives alone and they are concerned for patient s safety with her memory fading.  Patient will allow her son, Kendall, to drive her to appointments and errands.  Kendall will be attending the upcoming appt with patient, he will be sitting in the lobby.  There is not a consent to communicate on the chart, he is wondering if PCP feels this would be a recommendation for patient to have a consent to communicate completed.  The records also noted  family wants to make sure she is still safe to do so.  They are unsure what is 'normal part of aging' and what is pathologic. Difficulty completing her taxes, which is not like her.  Last year she completed her taxes herself.  This year, they had someone do it for her.  She had trouble signing the forms. Repetitive storytelling, or reminding of tasks, events, etc.  Records noted a significant medical history for hyperlipidemia, hypertension, and osteopenia.  Records indicated that she was being treated with losartan, lovastatin, ropivacaine, triamcinolone, aspirin, vitamin D, multivitamin, and vitamin C. An MRI scan of the brain report from 3/10/2020 noted  Diffuse cerebral volume loss and cerebral white matter changes consistent with chronic small vessel ischemic disease. No evidence for acute intracranial pathology.     CLINICAL INTERVIEW: The interview was conducted via the [employer] platform for this telehealth neuropsychological evaluation. The patient's son, Otoniel, was present for the interview.  On interview, the patient denied significant difficulties with memory, attention,  "problem solving, language, or visual spatial abilities.  The patient said that she has noted some changes in her memory, but she attributed the changes to normal aging. The patient's son indicated that the family has some concerns with the patient's memory, such as repeating herself frequently, and having the same conversation over and over.  He also reported that she has difficulty remembering what the physicians say to her during medical appointments, so he accompanies her.  However, her son also reported that she is able to complete puzzles in the daily newspaper and she manages her household appropriately.  He also reported that while she drives only when necessary, such as to the grocery store.  He noted that the family's concern is to make sure the patient is safe staying in her home, and they want her to stay in her home as long as possible.  The patient denied any difficulty with driving, noting that she only drives to the grocery store which is about 3 to 5 miles away.  The patient reported that she still manages her finances, with the only change being this year her son helped her pull her tax paperwork together.  The patient denied difficulty managing her medications or with household chores.    When asked about her mood, the patient reported that it was \"okay.\"  She denied significant depression or anxiety.  She reported that she is \"glad it is summer\" and her son elaborated that she does not like winter months.  The patient denied significant problems with sleep, and reported that she sleeps from approximately 7 PM to 7 AM.  However, the patient and her son noted that her appetite is poor and has decreased over the past year.  Her son indicated that the only time she eats regular meals is when they go out to a restaurant, but she has been unable to do this recently due to the COVID-19 pandemic.  The patient denied any previous contact with mental health professionals.  She denied any suicidal or " homicidal ideation.  She denied any history of suicide attempts.  The patient also denied any history of hallucinations or delusions.  The patient reported that she drinks approximately 2 glasses of wine in the evening.  She also noted that she stopped smoking many years ago, and denied any use of illicit substances.    Medically, her son noted that she has remote history of a lung disease, but this is not an active problem.  They also noted that she has had a history of knee replacement surgery and arthritis in her hands.  The patient's son noted that about 1.5 years ago she had an episode of confusion that lasted for approximate 45 to 60 minutes.  He indicated that this resolved and has not reoccurred.  The son noted that there was no language difficulties or facial droop noted by the family during this episode.  The patient's son also noted that she is treated with medication for hypercholesterolemia and hypertension.  The patient denied any history of traumatic brain injury with loss of consciousness.  She also denied any history multiple sclerosis, stroke, seizures, movement disorders, sleep apnea, hypothyroidism, diabetes, heart disease, cancer, liver disease, or kidney disease.  The patient reported that she wears eyeglasses for reading, and her son noted that she had eye surgery to correct her vision in the past.  The patient denied any hearing problems.    Academically, the patient reported that she graduated from high school and was a good student.  She denied ever being in special education classes or having to repeat a grade.  She noted that she is a  and has been  one time.  She also reported that she has 4 children, including one son and 3 daughters.  The son noted that he lives nearby and has frequent contact with his mother.  They noted that she lives at home alone and that she is a retired .    BEHAVIORAL OBSERVATIONS:  On examination, the patient was casually but appropriately  dressed and groomed. The patient was cooperative with the evaluation and was talkative. The patient appeared to put forth her best effort on all tasks. Thus, the results of this evaluation are a reasonably valid reflection of the patient s current level of functioning. There were no indications of hallucinations, delusions or unusual thought processes. The patient was poorly oriented to the date but she knew the year. She was better oriented to personal information.  Her affect was appropriate but she had difficulty with stamina. Some instructions needed to be repeated. She also had difficulty retaining instructions for several tasks    RESULTS: Formal performance validity measures were within expected limits and consistent with the above-noted observations.  Psychometric estimates of the patient's premorbid global cognitive functioning based on single word reading ability were in the high average range, which is consistent with her educational and occupational history.    Measures of attention/concentration were variable.  For example, a digit span task was in the average range.  Additionally, a simple oral sequencing tested integrates attention was within expectancy.  However, another measure of timed attention and processing speed was in the impaired range.  The results indicated evidence for slowed speed of information processing.    Measures of the patient's memory functioning were in the impaired range.  On a word list learning task, there was evidence for very poor encoding of new information.  Further, delayed recall was in the severely impaired range and she could not recall any information following a delay.  A recognition format on this task did not improve her performance significantly.  Likewise, on a story memory test, immediate recall was in the impaired range, and she could not recall any information following a delay.  Thus, there was evidence for deficits in encoding and retrieval of previously  learned information, and in rapid forgetting of previously learned information.    Measures of the patient's language functioning were variable.  For example, vocabulary ability was in the average range, as was confrontation naming.  However, semantic and phonemic fluency were in the impaired range.  Receptive language functioning, as measured by a complex ideational material task, was also in the impaired range.  Visual-spatial abilities were generally within expected limits and a personal strength.  For example, motor-free visual reasoning was in the average range.  Further, motor-free line orientation judgment was within expectancy.    Measures of the patient's executive functioning were generally in the impaired range.  For example, verbal abstract reasoning was in the borderline impaired range.  Simple social judgment was poorer than expected and in the low average range.  Verbal fluency was in the impaired range as well.  A complex oral sequencing task that integrates cognitive flexibility was in the severely impaired range and could not be completed.  A functional measure of health and safety awareness was in the impaired range.  For example, she could not articulate to reasons why it would be dangerous to stay in bed all the time.  Additionally, she could not state what to do if she unintentionally lost 10 pounds in 4 weeks and she could not articulate two issues regarding her health over the past 5 years. There was also evidence for limited insight into her cognitive deficits.    Assessment of the patient's emotional functioning was completed utilizing the clinical interview and self-report measures of depression and anxiety.  On the self-report measures, the patient denied symptoms of clinically significant anxiety, however she noted moderate to severe symptoms of depression.    SUMMARY:  The following opinions and recommendations are based on the interview and formal testing data obtained via telephone,  thus all results were interpreted with caution.  In summary, the neuropsychological assessment results indicated evidence for severe deficits with memory, including evidence for rapid forgetting of previously learned information.  There was also evidence for deficits with encoding and retrieval of previously learned verbal information.  Structure and recognition formats did not significantly improve her verbal memory performance.  There was also evidence for slowed speed of information processing and for significant deficits in executive functioning, including cognitive flexibility, verbal fluency, insight, and judgment including limited awareness of basic health and safety.  Language functioning and visual-spatial abilities were variable.   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   The specific etiology of her cognitive decline is not possible based on this evaluation alone. These results indicated that she at high risk for further decline in cognitive functioning.  The patient also endorsed moderate to severe symptoms of depression on a self-report measure, consistent with a depressive disorder.  However, the pattern and extent of her cognitive difficulties is greater than could be accounted for by this etiology alone.     RECOMMENDATIONS:   1.  Given these results, a referral for neurological consultation is strongly recommended.  2. Safety is a significant concern, particularly given her memory and executive functioning deficits.  She will likely require a high level of supervision to her daily activities, and 24-hour supervision is recommended.  It is recommended that the patient not reside alone.  3. The results of the evaluation also indicated that she has limited capacity for informed personal and medical decision making given her significant memory and executive functioning deficits.  Therefore, utilization of a power of  is  recommended in order to ensure the patient's best interest.  4. Potentially dangerous activities, such as driving, should be avoided due to the significantly increased risk of accidents related to her serious cognitive deficits.  5.  Other potentially dangerous activities such as cooking should be avoided or closely supervised    6. Pharmacological intervention to address the cognitive decline should be considered, if not medically contraindicated.    7. A referral for cognitive rehabilitation therapy is recommended to address compensatory strategies for the above noted cognitive difficulties. One option for this service would be Lupe Castro MA, CCC-SLP at [facility] at [email or web address]  8. A full medical evaluation of any treatable causes of cognitive decline is also recommended, if this has not already been accomplished.  Given the significant cognitive decline, a full evaluation of any infectious processes, vitamin deficiencies or other metabolic abnormalities is recommended, to rule out these as possible contributors.   9. The patient would benefit from case management and in-home services to assist her in compensating for her cognitive difficulties. Thus, a referral to the Community Access for Disability Inclusion (CADI) Waiver program is recommended, given her psychiatric and cognitive issues. Utilization of an adult rehabilitative mental health services (ARMHS) worker would be particularly beneficial for her.   10. Given her significant depressive symptoms, a referral for psychiatric consultation is recommended as well. Given the extent of her memory difficulties, psychotherapeutic intervention likely would not be beneficial.   11. Have a trusted loved one present during medical appointments to facilitate memory for information provided and/or obtain written outlines of information to reduce demands on verbal memory.  12. A referral  for a support group for individuals with cognitive decline and their families, such as the Alzheimer s Association, is also recommended. The link for the [place] chapter of this organization is [email or web address] and the phone number is 1-741.107.4841.  13. The results of the evaluation now constitute a baseline of the patient s cognitive functioning.  Given the evidence for significant deficits in cognitive functioning, a referral for a neuropsychological reevaluation in approximately one year is recommended in order to clarify the differential diagnosis, document any changes in cognitive functioning, and provide further recommendations and prognosis to the patient, family and treatment team.    Thank you for referring this interesting individual.  If you have questions, please feel free to contact me.      Benton Fernández, PhD, ABPP, LP  Professor and Licensed Psychologist [id number]  Board Certified Clinical Neuropsychologist  Phone: [phone]  Fax: [phone]    Time Spent:    Minutes Date Code Units   Total Time-Neuropsychologist Professional 215 6/1/20 28871 1      85452 3   Neuropsychologist Admin/scoring 0 6/1/20 38192 0      32599 0   Diagnostic Interview 21 6/1/20 01187 1   Psychometrician Time-Test Administration/Score 164 6/1/20 18173 1      33013 4   Diagnosis F03.90 R41.3 F32.9

## 2020-06-01 NOTE — NURSING NOTE
Pt was seen for neuropsychological evaluation at the request of Dr. Coco Mejia for the purposes of diagnostic clarification and treatment planning. 164 minutes of video test administration and scoring were provided by this writer. Please see Dr. Benton Fernández's report for a full interpretation of the findings.    Video Start: 12:53PM  Video Stop: 2:36PM    Christian Lopez  Psychometrist

## 2020-06-01 NOTE — PROGRESS NOTES
"Gabriela Agrawal is a 86 year old female who is being evaluated via a billable video visit.      The patient has been notified of following:     \"This video visit will be conducted via a call between you and your physician/provider. We have found that certain health care needs can be provided without the need for an in-person physical exam.  This service lets us provide the care you need with a video conversation. Video visits are billed at different rates depending on your insurance coverage.  Please reach out to your insurance provider with any questions. If during the course of the call the physician/provider feels a video visit is not appropriate, you will not be charged for this service.\"    Patient has given verbal consent for Video visit? Yes    Patient would like the video invitation sent by: Send to e-mail at: [email or web address]    Will anyone else be joining your video visit? No    Video-Visit Details    Type of service:  Video Visit    Video Start Time: 12:30 PM  Video End Time: 12:51 PM  Formal Testin:53-2:36 PM    Originating Location (pt. Location): Home    Distant Location (provider location):   [facility]     Platform used for Video Visit: [facility]   RE: Gabriela Agrawal  MR#: [id number]  : 1933  DOS: 2020  GENEVIEVE:  2020    Neuropsychology Laboratory  [address] [address] [phone]    NEUROPSYCHOLOGICAL CONSULTATION      REASON FOR REFERRAL:  Gabriela Agrawal is a 86 year old female with 12 years of education who was referred for a neuropsychological evaluation by Dr. Mejia to assess her cognitive and emotional functioning secondary to memory difficulties. The evaluation was requested in order to document her current level of functioning, assist with the differential diagnosis and provide appropriate recommendations to the patient, family and treatment team. The patient was informed that the evaluation included multiple " measures of performance and symptom validity, and she was encouraged to provide her best effort at all times.  The nature of the neuropsychological evaluation was also discussed, including limits of confidentiality (for suspected child or elder abuse, potential homicide or suicide, and court orders). The patient was also informed that the report would be placed on the electronic medical records system. The patient was also given an opportunity to ask questions. The patient indicated that she understood the information and consented to participate in the evaluation.    Due to circumstances that prevent in-person clinical visits, this assessment was conducted using telehealth methods (including remote audiovisual presentation of test instructions and test stimuli). The standard administration of these procedures involves in-person, face-to-face methods. The impact of applying non-standard administration methods has been evaluated only in part by scientific research. While every effort was made to simulate standard assessment practices, the diagnostic conclusions and recommendations for treatment provided in this report are being advanced with these reservations.    PROCEDURES:   Review of records and clinical interview  Mental Status-orientation, Wide Range Achievement Test-4, Wechsler Adult Intelligence Scale-IV, Caro Verbal Learning Test-Revised, Clock Drawing Test, Verbal Fluency Test, Geriatric Depression Scale, Worthy Anxiety Inventory, ILS Health and Safety Questions, BDAE Complex Ideational Material, RBANS, Oral Trailmaking Test, Test of Sustained Attention and Tracking and [place] Naming Test (15 Item version)    REVIEW OF RECORDS: Medical records from 3/3/2020 noted that the patient s son called  with some information that patient will not be forthcoming about: She is getting forgetful and it has become worse. She also repeats herself more and more during a conversation. This has been progressively getting  worse over the last year.  Son of patient reports that he will answer the same questions several times a day. Patient lives alone and they are concerned for patient s safety with her memory fading.  Patient will allow her son, Kendall, to drive her to appointments and errands.  Kendall will be attending the upcoming appt with patient, he will be sitting in the lobby.  There is not a consent to communicate on the chart, he is wondering if PCP feels this would be a recommendation for patient to have a consent to communicate completed.  The records also noted  family wants to make sure she is still safe to do so.  They are unsure what is 'normal part of aging' and what is pathologic. Difficulty completing her taxes, which is not like her.  Last year she completed her taxes herself.  This year, they had someone do it for her.  She had trouble signing the forms. Repetitive storytelling, or reminding of tasks, events, etc.  Records noted a significant medical history for hyperlipidemia, hypertension, and osteopenia.  Records indicated that she was being treated with losartan, lovastatin, ropivacaine, triamcinolone, aspirin, vitamin D, multivitamin, and vitamin C. An MRI scan of the brain report from 3/10/2020 noted  Diffuse cerebral volume loss and cerebral white matter changes consistent with chronic small vessel ischemic disease. No evidence for acute intracranial pathology.     CLINICAL INTERVIEW: The interview was conducted via the [employer] platform for this telehealth neuropsychological evaluation. The patient's son, Otoniel, was present for the interview.  On interview, the patient denied significant difficulties with memory, attention, problem solving, language, or visual spatial abilities.  The patient said that she has noted some changes in her memory, but she attributed the changes to normal aging. The patient's son indicated that the family has some concerns with the patient's memory, such as repeating herself  "frequently, and having the same conversation over and over.  He also reported that she has difficulty remembering what the physicians say to her during medical appointments, so he accompanies her.  However, her son also reported that she is able to complete puzzles in the daily newspaper and she manages her household appropriately.  He also reported that while she drives only when necessary, such as to the grocery store.  He noted that the family's concern is to make sure the patient is safe staying in her home, and they want her to stay in her home as long as possible.  The patient denied any difficulty with driving, noting that she only drives to the grocery store which is about 3 to 5 miles away.  The patient reported that she still manages her finances, with the only change being this year her son helped her pull her tax paperwork together.  The patient denied difficulty managing her medications or with household chores.    When asked about her mood, the patient reported that it was \"okay.\"  She denied significant depression or anxiety.  She reported that she is \"glad it is summer\" and her son elaborated that she does not like winter months.  The patient denied significant problems with sleep, and reported that she sleeps from approximately 7 PM to 7 AM.  However, the patient and her son noted that her appetite is poor and has decreased over the past year.  Her son indicated that the only time she eats regular meals is when they go out to a restaurant, but she has been unable to do this recently due to the COVID-19 pandemic.  The patient denied any previous contact with mental health professionals.  She denied any suicidal or homicidal ideation.  She denied any history of suicide attempts.  The patient also denied any history of hallucinations or delusions.  The patient reported that she drinks approximately 2 glasses of wine in the evening.  She also noted that she stopped smoking many years ago, and denied any " use of illicit substances.    Medically, her son noted that she has remote history of a lung disease, but this is not an active problem.  They also noted that she has had a history of knee replacement surgery and arthritis in her hands.  The patient's son noted that about 1.5 years ago she had an episode of confusion that lasted for approximate 45 to 60 minutes.  He indicated that this resolved and has not reoccurred.  The son noted that there was no language difficulties or facial droop noted by the family during this episode.  The patient's son also noted that she is treated with medication for hypercholesterolemia and hypertension.  The patient denied any history of traumatic brain injury with loss of consciousness.  She also denied any history multiple sclerosis, stroke, seizures, movement disorders, sleep apnea, hypothyroidism, diabetes, heart disease, cancer, liver disease, or kidney disease.  The patient reported that she wears eyeglasses for reading, and her son noted that she had eye surgery to correct her vision in the past.  The patient denied any hearing problems.    Academically, the patient reported that she graduated from high school and was a good student.  She denied ever being in special education classes or having to repeat a grade.  She noted that she is a  and has been  one time.  She also reported that she has 4 children, including one son and 3 daughters.  The son noted that he lives nearby and has frequent contact with his mother.  They noted that she lives at home alone and that she is a retired .    BEHAVIORAL OBSERVATIONS:  On examination, the patient was casually but appropriately dressed and groomed. The patient was cooperative with the evaluation and was talkative. The patient appeared to put forth her best effort on all tasks. Thus, the results of this evaluation are a reasonably valid reflection of the patient s current level of functioning. There were no  indications of hallucinations, delusions or unusual thought processes. The patient was poorly oriented to the date but she knew the year. She was better oriented to personal information.  Her affect was appropriate but she had difficulty with stamina. Some instructions needed to be repeated. She also had difficulty retaining instructions for several tasks    RESULTS: Formal performance validity measures were within expected limits and consistent with the above-noted observations.  Psychometric estimates of the patient's premorbid global cognitive functioning based on single word reading ability were in the high average range, which is consistent with her educational and occupational history.    Measures of attention/concentration were variable.  For example, a digit span task was in the average range.  Additionally, a simple oral sequencing tested integrates attention was within expectancy.  However, another measure of timed attention and processing speed was in the impaired range.  The results indicated evidence for slowed speed of information processing.    Measures of the patient's memory functioning were in the impaired range.  On a word list learning task, there was evidence for very poor encoding of new information.  Further, delayed recall was in the severely impaired range and she could not recall any information following a delay.  A recognition format on this task did not improve her performance significantly.  Likewise, on a story memory test, immediate recall was in the impaired range, and she could not recall any information following a delay.  Thus, there was evidence for deficits in encoding and retrieval of previously learned information, and in rapid forgetting of previously learned information.    Measures of the patient's language functioning were variable.  For example, vocabulary ability was in the average range, as was confrontation naming.  However, semantic and phonemic fluency were in the  impaired range.  Receptive language functioning, as measured by a complex ideational material task, was also in the impaired range.  Visual-spatial abilities were generally within expected limits and a personal strength.  For example, motor-free visual reasoning was in the average range.  Further, motor-free line orientation judgment was within expectancy.    Measures of the patient's executive functioning were generally in the impaired range.  For example, verbal abstract reasoning was in the borderline impaired range.  Simple social judgment was poorer than expected and in the low average range.  Verbal fluency was in the impaired range as well.  A complex oral sequencing task that integrates cognitive flexibility was in the severely impaired range and could not be completed.  A functional measure of health and safety awareness was in the impaired range.  For example, she could not articulate to reasons why it would be dangerous to stay in bed all the time.  Additionally, she could not state what to do if she unintentionally lost 10 pounds in 4 weeks and she could not articulate two issues regarding her health over the past 5 years. There was also evidence for limited insight into her cognitive deficits.    Assessment of the patient's emotional functioning was completed utilizing the clinical interview and self-report measures of depression and anxiety.  On the self-report measures, the patient denied symptoms of clinically significant anxiety, however she noted moderate to severe symptoms of depression.    SUMMARY:  The following opinions and recommendations are based on the interview and formal testing data obtained via telephone, thus all results were interpreted with caution.  In summary, the neuropsychological assessment results indicated evidence for severe deficits with memory, including evidence for rapid forgetting of previously learned information.  There was also evidence for deficits with encoding and  retrieval of previously learned verbal information.  Structure and recognition formats did not significantly improve her verbal memory performance.  There was also evidence for slowed speed of information processing and for significant deficits in executive functioning, including cognitive flexibility, verbal fluency, insight, and judgment including limited awareness of basic health and safety.  Language functioning and visual-spatial abilities were variable.   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   The specific etiology of her cognitive decline is not possible based on this evaluation alone. These results indicated that she at high risk for further decline in cognitive functioning.  The patient also endorsed moderate to severe symptoms of depression on a self-report measure, consistent with a depressive disorder.  However, the pattern and extent of her cognitive difficulties is greater than could be accounted for by this etiology alone.     RECOMMENDATIONS:   1.  Given these results, a referral for neurological consultation is strongly recommended.  2. Safety is a significant concern, particularly given her memory and executive functioning deficits.  She will likely require a high level of supervision to her daily activities, and 24-hour supervision is recommended.  It is recommended that the patient not reside alone.  3. The results of the evaluation also indicated that she has limited capacity for informed personal and medical decision making given her significant memory and executive functioning deficits.  Therefore, utilization of a power of  is recommended in order to ensure the patient's best interest.  4. Potentially dangerous activities, such as driving, should be avoided due to the significantly increased risk of accidents related to her serious cognitive deficits.  5.  Other potentially dangerous activities such as cooking  should be avoided or closely supervised    6. Pharmacological intervention to address the cognitive decline should be considered, if not medically contraindicated.    7. A referral for cognitive rehabilitation therapy is recommended to address compensatory strategies for the above noted cognitive difficulties. One option for this service would be Lupe Castro MA, CCC-SLP at [facility] at [email or web address]  8. A full medical evaluation of any treatable causes of cognitive decline is also recommended, if this has not already been accomplished.  Given the significant cognitive decline, a full evaluation of any infectious processes, vitamin deficiencies or other metabolic abnormalities is recommended, to rule out these as possible contributors.   9. The patient would benefit from case management and in-home services to assist her in compensating for her cognitive difficulties. Thus, a referral to the Community Access for Disability Inclusion (CADI) Waiver program is recommended, given her psychiatric and cognitive issues. Utilization of an adult rehabilitative mental health services (ARMHS) worker would be particularly beneficial for her.   10. Given her significant depressive symptoms, a referral for psychiatric consultation is recommended as well. Given the extent of her memory difficulties, psychotherapeutic intervention likely would not be beneficial.   11. Have a trusted loved one present during medical appointments to facilitate memory for information provided and/or obtain written outlines of information to reduce demands on verbal memory.  12. A referral for a support group for individuals with cognitive decline and their families, such as the Alzheimer s [employer], is also recommended. The link for the [place] chapter of this organization is https://www.alz.org/mnnd and the phone number is 1-579.276.5825.  13. The  results of the evaluation now constitute a baseline of the patient s cognitive functioning.  Given the evidence for significant deficits in cognitive functioning, a referral for a neuropsychological reevaluation in approximately one year is recommended in order to clarify the differential diagnosis, document any changes in cognitive functioning, and provide further recommendations and prognosis to the patient, family and treatment team.    Thank you for referring this interesting individual.  If you have questions, please feel free to contact me.      Benton Fernández, PhD, ABPP, LP  Professor and Licensed Psychologist [id number]  Board Certified Clinical Neuropsychologist  Phone: [phone]  Fax: [phone]    Time Spent:    Minutes Date Code Units   Total Time-Neuropsychologist Professional 215 6/1/20 98631 1      57057 3   Neuropsychologist Admin/scoring 0 6/1/20 57983 0      48861 0   Diagnostic Interview 21 6/1/20 22599 1   Psychometrician Time-Test Administration/Score 164 6/1/20 41391 1      92150 4   Diagnosis F03.90 R41.3 F32.9

## 2020-06-02 ENCOUNTER — TELEPHONE (OUTPATIENT)
Dept: INTERNAL MEDICINE | Facility: CLINIC | Age: 85
End: 2020-06-02

## 2020-06-02 PROBLEM — F03.90 SENILE DEMENTIA WITHOUT BEHAVIORAL DISTURBANCE (H): Status: ACTIVE | Noted: 2020-06-02

## 2020-06-03 NOTE — TELEPHONE ENCOUNTER
Spoke with pt, appt scheduled for Tues., 6/9/2020 for 40 min with Dr. Mejia. Requested she bring son.    Fallon CHRISTIANSON RN, BSN

## 2020-06-03 NOTE — TELEPHONE ENCOUNTER
I would recommend to call the patient saying Dr. Mejia wants to follow-up on the Neuropsych testing you had done earlier, and request to have son present at time of appointment.  The Neuropsych person should have reviewed the results at the time of the visit, but I will go over in more detail

## 2020-06-03 NOTE — TELEPHONE ENCOUNTER
Patient had neuropsych testing yesterday that showed dementia, a new diagnosis.  I recommend a virtual follow-up with me (with son present).  Please make for 40 min.

## 2020-06-04 NOTE — PROGRESS NOTES
Subjective     Gabriela Agrawal is a 86 year old female who presents to clinic today for the following health issues:    HPI     Follow up Neuropsych:  6/1/20  --here with son today, Kendall  --Dr. Fernández reviewed the findings with Kendall.  Kendall reviewed with sister jaqui and Gabriela. Family is dedicated to keeping her safe  --she is no longer driving  --family looking into more supervised living  --lives in senior care community but independent living.       Report of Neuropsych testing  SUMMARY:  The following opinions and recommendations are based on the interview and formal testing data obtained via telephone, thus all results were interpreted with caution.  In summary, the neuropsychological assessment results indicated evidence for severe deficits with memory, including evidence for rapid forgetting of previously learned information.  There was also evidence for deficits with encoding and retrieval of previously learned verbal information.  Structure and recognition formats did not significantly improve her verbal memory performance.  There was also evidence for slowed speed of information processing and for significant deficits in executive functioning, including cognitive flexibility, verbal fluency, insight, and judgment including limited awareness of basic health and safety.  Language functioning and visual-spatial abilities were variable.   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   The specific etiology of her cognitive decline is not possible based on this evaluation alone. These results indicated that she at high risk for further decline in cognitive functioning.  The patient also endorsed moderate to severe symptoms of depression on a self-report measure, consistent with a depressive disorder.  However, the pattern and extent of her cognitive difficulties is greater than could be accounted for by this etiology alone.      RECOMMENDATIONS:   1.   Given these results, a referral for neurological consultation is strongly recommended.  2. Safety is a significant concern, particularly given her memory and executive functioning deficits.  She will likely require a high level of supervision to her daily activities, and 24-hour supervision is recommended.  It is recommended that the patient not reside alone.  3. The results of the evaluation also indicated that she has limited capacity for informed personal and medical decision making given her significant memory and executive functioning deficits.  Therefore, utilization of a power of  is recommended in order to ensure the patient's best interest.  4. Potentially dangerous activities, such as driving, should be avoided due to the significantly increased risk of accidents related to her serious cognitive deficits.  5.  Other potentially dangerous activities such as cooking should be avoided or closely supervised    6. Pharmacological intervention to address the cognitive decline should be considered, if not medically contraindicated.    7. A referral for cognitive rehabilitation therapy is recommended to address compensatory strategies for the above noted cognitive difficulties. One option for this service would be Lupe Castro MA, CCC-SLP at [facility] at [email or web address]  8. A full medical evaluation of any treatable causes of cognitive decline is also recommended, if this has not already been accomplished.  Given the significant cognitive decline, a full evaluation of any infectious processes, vitamin deficiencies or other metabolic abnormalities is recommended, to rule out these as possible contributors.   9. The patient would benefit from case management and in-home services to assist her in compensating for her cognitive difficulties. Thus, a referral to the Community Access for Disability Inclusion (CADI) Waiver program is  recommended, given her psychiatric and cognitive issues. Utilization of an adult rehabilitative mental health services (ARMHS) worker would be particularly beneficial for her.   10. Given her significant depressive symptoms, a referral for psychiatric consultation is recommended as well. Given the extent of her memory difficulties, psychotherapeutic intervention likely would not be beneficial.   11. Have a trusted loved one present during medical appointments to facilitate memory for information provided and/or obtain written outlines of information to reduce demands on verbal memory.  12. A referral for a support group for individuals with cognitive decline and their families, such as the Alzheimer s Association, is also recommended. The link for the [place] chapter of this organization is https://www.alz.org/mnnd and the phone number is 1-846.569.2436.  13. The results of the evaluation now constitute a baseline of the patient s cognitive functioning.  Given the evidence for significant deficits in cognitive functioning, a referral for a neuropsychological reevaluation in approximately one year is recommended in order to clarify the differential diagnosis, document any changes in cognitive functioning, and provide further recommendations and prognosis to the patient, family and treatment team.          Current Outpatient Medications   Medication Sig Dispense Refill     aspirin 81 MG tablet Take 1 tablet by mouth daily.  3     Calcium Carbonate-Vitamin D (CALCIUM + D PO) Take 1 tablet by mouth daily.       cholecalciferol (VITAMIN D) 1000 UNIT tablet Take 1 tablet by mouth daily  100 tablet 12     Coenzyme Q10 (CO Q-10 PO) Take by mouth daily       Cyanocobalamin (VITAMIN B-12 PO) Take 1 tablet by mouth daily       losartan (COZAAR) 100 MG tablet Take 1 tablet (100 mg) by mouth daily 90 tablet 3     lovastatin (MEVACOR) 20 MG tablet Take 1 tablet (20 mg) by mouth At Bedtime 90 tablet 3     Magnesium  "Oxide, [id number], (MAG-200 PO) Take 1 tablet by mouth daily.       MULTI-VITAMIN OR TABS 1 TABLET DAILY       OVER-THE-COUNTER Tumeric-Curcumen 1 capsule daily , unknown dose       VITAMIN C OR 1 TABLET DAILY       order for DME Equipment being ordered: gamekeeper brace 1 Device 0         Reviewed and updated as needed this visit by Provider         Review of Systems   Constitutional, HEENT, cardiovascular, pulmonary, GI, , musculoskeletal, neuro, skin, endocrine and psych systems are negative, except as otherwise noted.      Objective    /70 (BP Location: Right arm, Patient Position: Chair, Cuff Size: Adult Regular)   Pulse 88   Temp 97.1  F (36.2  C) (Tympanic)   Resp 20   Ht 1.702 m (5' 7\")   Wt 72.4 kg (159 lb 9.6 oz)   SpO2 95%   BMI 25.00 kg/m    Body mass index is 25 kg/m .  Physical Exam   GENERAL APPEARANCE: healthy, alert and no distress  PSYCH: affect normal/bright and forgetful, asking physician same question multiple times throughout the visit          Assessment & Plan       ICD-10-CM    1. Senile dementia without behavioral disturbance (H)  F03.90 NEUROLOGY ADULT REFERRAL     donepezil (ARICEPT) 5 MG tablet     donepezil (ARICEPT) 10 MG tablet     vitamin E (TOCOPHEROL) 1000 units (900 mg) capsule     CARE COORDINATION REFERRAL   2. Essential hypertension, benign  I10    3. Hyperlipidemia LDL goal <130  E78.5           Patient Instructions   1. Referral to Neurology  2. Start Aricept (Donepezil) 5 mg daily.  After 1 month if tolerating well, increase to 10 mg.  GI side effects or insomnia are the most common side effects   3. Recommend to start Vitamin E 2000 units  once daily.  It has been shown to slow functional decline in Alzheimer's Disease without increased risk of serious side effects.  4. Referral to Care Coordinator for help with social support resources.  Consider Life Alert, remote monitoring with cameras, daily, ideally twice daily checks by family members.    5. Consider " Elder Care    6. Use a pillbox.  Have family check this daily.    7. Recommend to stop aspirin and statin.  Continue losartan, Calcium and Vitamin D, Multivitamin               Dementia Resources        Ten Tips for Communicating with a Person with Dementia  We aren t born knowing how to communicate with a person with dementia--but we can learn. Improving your communication skills will help make caregiving less stressful and will likely improve the quality of your relationship with your loved one. Good communication skills will also enhance your ability to handle the difficult behavior you may encounter as you care for a person with a dementing illness.  1. Set a positive mood for interaction. Your attitude and body language communicate your feelings and thoughts more strongly than your words do. Set a positive mood by speaking to your loved one in a pleasant and respectful manner. Use facial expressions, tone of voice, and physical touch to help convey your message and show your feelings of affection.  2. Get the person s attention. Limit distractions and noise--turn off the radio or TV, close the curtains or shut the door, or move to quieter surroundings. Before speaking, make sure you have her attention; address her by name, identify yourself by name and relation, and use nonverbal cues and touch to help keep her focused. If she is seated, get down to her level and maintain eye contact.  3. State your message clearly. Use simple words and sentences. Speak slowly, distinctly, and in a reassuring tone. Refrain from raising your voice higher or louder; instead, pitch your voice lower. If she doesn t understand the first time, use the same wording to repeat your message or question. If she still doesn t understand, wait a few minutes and rephrase the question. Use the names of people and places instead of pronouns (he, she, they) or abbreviations.  4. Ask simple, answerable questions. Ask one question at a  time; those with yes or no answers work best. Refrain from asking open-ended questions or giving too many choices. For example, ask,  Would you like to wear your white shirt or your blue shirt?  Better still, show her the choices--visual prompts and cues also help clarify your question and can guide her response.  5. Listen with your ears, eyes, and heart. Be patient in waiting for your loved one s reply. If she is struggling for an answer, it s okay to suggest words. Watch for nonverbal cues and body language, and respond appropriately. Always strive to listen for the meaning and feelings that underlie the words.  6. Break down activities into a series of steps. This makes many tasks much more manageable. You can encourage your loved one to do what he can, gently remind him of steps he tends to forget, and assist with steps he s no longer able to accomplish on his own. Using visual cues, such as showing him with your hand where to place the dinner plate, can be very helpful.  7. When the going gets tough, distract and redirect. If your loved one becomes upset or agitated, try changing the subject or the environment. For example, ask him for help or suggest going for a walk. It is important to connect with the person on a feeling level, before you redirect. You might say,  I see you re feeling sad--I m sorry you re upset. Let s go get something to eat.   8. Respond with affection and reassurance. People with dementia often feel confused, anxious, and unsure of themselves. Further, they often get reality confused and may recall things that never really occurred. Avoid trying to convince them they are wrong. Stay focused on the feelings they are demonstrating (which are real) and respond with verbal and physical expressions of comfort, support, and reassurance. Sometimes holding hands, touching, hugging, and praise will get the person to respond when all else fails.  9. Remember the good old days. Remembering the past  is often a soothing and affirming activity. Many people with dementia may not remember what happened 45 minutes ago, but they can clearly recall their lives 45 years earlier. Therefore, avoid asking questions that rely on short-term memory, such as asking the person what they had for lunch. Instead, try asking general questions about the person s distant past--this information is more likely to be retained.  10. Maintain your sense of humor. Use humor whenever possible, though not at the person's expense. People with dementia tend to retain their social skills and are usually delighted to laugh along with you.      https://www.caregiver.org/caregivers-guide-understanding-dementia-behaviors        This fact sheet was prepared by [facility] and was reviewed by NEYMAR Merino, education and  and specialist in dementia care.   2004, 2008, 2016 [employer] [facility] [employer]. All rights reserved.        Find a [employer] [facility]    Https://www.alz.org/events/event_search?[email or web address]    General Information    Http://www.actonalz.org/  Alzheimers Association 24/7 Helpline  405.520.7900                    Return in about 6 months (around 12/9/2020) for Routine Follow-Up/Med Check.    Coco Mejia,   [facility]

## 2020-06-08 ENCOUNTER — ALLIED HEALTH/NURSE VISIT (OUTPATIENT)
Dept: FAMILY MEDICINE | Facility: CLINIC | Age: 85
End: 2020-06-08
Payer: MEDICARE

## 2020-06-08 VITALS — SYSTOLIC BLOOD PRESSURE: 124 MMHG | DIASTOLIC BLOOD PRESSURE: 68 MMHG | HEART RATE: 84 BPM

## 2020-06-08 DIAGNOSIS — I10 ESSENTIAL HYPERTENSION, BENIGN: Primary | ICD-10-CM

## 2020-06-08 PROCEDURE — 99207 ZZC NO CHARGE NURSE ONLY: CPT

## 2020-06-08 NOTE — NURSING NOTE
Gabriela Agrawal is a 86 year old year old patient who comes in today for a Blood Pressure check because of ongoing blood pressure monitoring.  BP was noted to be elevated at her 3/3/20 office visit and pt was directed to return for BP recheck.  Pt delayed until today due to COVID-19.    Pt has provider visit tomorrow.  She wanted her blood pressure rechecked in advance of tomorrow's visit.    Vital Signs as repeated by RN:  126/66, pulse of 80  124/68, pulse of 84, rechecked to confirm in-range reading since it was elevated at last visit and pt was asked to return for recheck.    Patient is taking medication as prescribed  Patient is tolerating medications well.    Current complaints: none  Disposition:  Pt is at goal.    Pt will be seen tomorrow to review neuro-psych testing.    Piedad Ayala RN

## 2020-06-09 ENCOUNTER — PATIENT OUTREACH (OUTPATIENT)
Dept: CARE COORDINATION | Facility: CLINIC | Age: 85
End: 2020-06-09

## 2020-06-09 ENCOUNTER — OFFICE VISIT (OUTPATIENT)
Dept: FAMILY MEDICINE | Facility: CLINIC | Age: 85
End: 2020-06-09
Payer: MEDICARE

## 2020-06-09 VITALS
RESPIRATION RATE: 20 BRPM | HEART RATE: 88 BPM | SYSTOLIC BLOOD PRESSURE: 130 MMHG | WEIGHT: 159.6 LBS | HEIGHT: 67 IN | TEMPERATURE: 97.1 F | DIASTOLIC BLOOD PRESSURE: 70 MMHG | BODY MASS INDEX: 25.05 KG/M2 | OXYGEN SATURATION: 95 %

## 2020-06-09 DIAGNOSIS — I10 ESSENTIAL HYPERTENSION, BENIGN: Chronic | ICD-10-CM

## 2020-06-09 DIAGNOSIS — F03.90 SENILE DEMENTIA WITHOUT BEHAVIORAL DISTURBANCE (H): Primary | ICD-10-CM

## 2020-06-09 DIAGNOSIS — E78.5 HYPERLIPIDEMIA LDL GOAL <130: Chronic | ICD-10-CM

## 2020-06-09 PROCEDURE — 99214 OFFICE O/P EST MOD 30 MIN: CPT | Performed by: INTERNAL MEDICINE

## 2020-06-09 RX ORDER — DONEPEZIL HYDROCHLORIDE 10 MG/1
10 TABLET, FILM COATED ORAL AT BEDTIME
Qty: 90 TABLET | Refills: 3 | Status: SHIPPED | OUTPATIENT
Start: 2020-06-09 | End: 2021-04-01

## 2020-06-09 RX ORDER — MULTIVIT WITH MINERALS/LUTEIN
2000 TABLET ORAL DAILY
Qty: 180 CAPSULE | Refills: 3 | Status: SHIPPED | OUTPATIENT
Start: 2020-06-09

## 2020-06-09 RX ORDER — DONEPEZIL HYDROCHLORIDE 5 MG/1
5 TABLET, FILM COATED ORAL AT BEDTIME
Qty: 30 TABLET | Refills: 0 | Status: SHIPPED | OUTPATIENT
Start: 2020-06-09 | End: 2020-07-09

## 2020-06-09 ASSESSMENT — ANXIETY QUESTIONNAIRES
2. NOT BEING ABLE TO STOP OR CONTROL WORRYING: SEVERAL DAYS
1. FEELING NERVOUS, ANXIOUS, OR ON EDGE: NOT AT ALL
3. WORRYING TOO MUCH ABOUT DIFFERENT THINGS: NOT AT ALL
5. BEING SO RESTLESS THAT IT IS HARD TO SIT STILL: NOT AT ALL
7. FEELING AFRAID AS IF SOMETHING AWFUL MIGHT HAPPEN: NOT AT ALL
GAD7 TOTAL SCORE: 1
6. BECOMING EASILY ANNOYED OR IRRITABLE: NOT AT ALL

## 2020-06-09 ASSESSMENT — PATIENT HEALTH QUESTIONNAIRE - PHQ9
SUM OF ALL RESPONSES TO PHQ QUESTIONS 1-9: 2
5. POOR APPETITE OR OVEREATING: NOT AT ALL

## 2020-06-09 ASSESSMENT — MIFFLIN-ST. JEOR: SCORE: 1196.57

## 2020-06-09 NOTE — PROGRESS NOTES
Clinic Care Coordination Contact  [facility]/Voicemail    Clinical Data: CHW Outreach attempted x 1.  Spoke to dtr, Odalis, who asked CHW to reach out to her brother, Kendall, tomorrow in regards to CCC.    Plan: CHW will call patient's son in 1-2 business days.    Note: Kendall and Odalis (son and dtr) on consent to communicate.     Referral reason: PCP referral as patient was just dx with dementia and per note, family may desire CCC support to review/connect with any appropriate community resources. May be fitting for either SW or RN so can make best judgement during call or based on family's preference of assessment date/time and who is open.     Rika GHOTRA, Community Health Worker  [facility] Care Coordination  [facility]  Phone: [phone]

## 2020-06-09 NOTE — PATIENT INSTRUCTIONS
1. Referral to Neurology  2. Start Aricept (Donepezil) 5 mg daily.  After 1 month if tolerating well, increase to 10 mg.  GI side effects or insomnia are the most common side effects   3. Recommend to start Vitamin E 2000 units  once daily.  It has been shown to slow functional decline in Alzheimer's Disease without increased risk of serious side effects.  4. Referral to Care Coordinator for help with social support resources.  Consider Life Alert, remote monitoring with cameras, daily, ideally twice daily checks by family members.    5. Consider Elder Care    6. Use a pillbox.  Have family check this daily.    7. Recommend to stop aspirin and statin.  Continue losartan, Calcium and Vitamin D, Multivitamin               Dementia Resources        Ten Tips for Communicating with a Person with Dementia  We aren t born knowing how to communicate with a person with dementia--but we can learn. Improving your communication skills will help make caregiving less stressful and will likely improve the quality of your relationship with your loved one. Good communication skills will also enhance your ability to handle the difficult behavior you may encounter as you care for a person with a dementing illness.  1. Set a positive mood for interaction. Your attitude and body language communicate your feelings and thoughts more strongly than your words do. Set a positive mood by speaking to your loved one in a pleasant and respectful manner. Use facial expressions, tone of voice, and physical touch to help convey your message and show your feelings of affection.  2. Get the person s attention. Limit distractions and noise--turn off the radio or TV, close the curtains or shut the door, or move to quieter surroundings. Before speaking, make sure you have her attention; address her by name, identify yourself by name and relation, and use nonverbal cues and touch to help keep her focused. If she is seated, get down to her level  and maintain eye contact.  3. State your message clearly. Use simple words and sentences. Speak slowly, distinctly, and in a reassuring tone. Refrain from raising your voice higher or louder; instead, pitch your voice lower. If she doesn t understand the first time, use the same wording to repeat your message or question. If she still doesn t understand, wait a few minutes and rephrase the question. Use the names of people and places instead of pronouns (he, she, they) or abbreviations.  4. Ask simple, answerable questions. Ask one question at a time; those with yes or no answers work best. Refrain from asking open-ended questions or giving too many choices. For example, ask,  Would you like to wear your white shirt or your blue shirt?  Better still, show her the choices--visual prompts and cues also help clarify your question and can guide her response.  5. Listen with your ears, eyes, and heart. Be patient in waiting for your loved one s reply. If she is struggling for an answer, it s okay to suggest words. Watch for nonverbal cues and body language, and respond appropriately. Always strive to listen for the meaning and feelings that underlie the words.  6. Break down activities into a series of steps. This makes many tasks much more manageable. You can encourage your loved one to do what he can, gently remind him of steps he tends to forget, and assist with steps he s no longer able to accomplish on his own. Using visual cues, such as showing him with your hand where to place the dinner plate, can be very helpful.  7. When the going gets tough, distract and redirect. If your loved one becomes upset or agitated, try changing the subject or the environment. For example, ask him for help or suggest going for a walk. It is important to connect with the person on a feeling level, before you redirect. You might say,  I see you re feeling sad--I m sorry you re upset. Let s go get something to eat.   8. Respond with  affection and reassurance. People with dementia often feel confused, anxious, and unsure of themselves. Further, they often get reality confused and may recall things that never really occurred. Avoid trying to convince them they are wrong. Stay focused on the feelings they are demonstrating (which are real) and respond with verbal and physical expressions of comfort, support, and reassurance. Sometimes holding hands, touching, hugging, and praise will get the person to respond when all else fails.  9. Remember the good old days. Remembering the past is often a soothing and affirming activity. Many people with dementia may not remember what happened 45 minutes ago, but they can clearly recall their lives 45 years earlier. Therefore, avoid asking questions that rely on short-term memory, such as asking the person what they had for lunch. Instead, try asking general questions about the person s distant past--this information is more likely to be retained.  10. Maintain your sense of humor. Use humor whenever possible, though not at the person's expense. People with dementia tend to retain their social skills and are usually delighted to laugh along with you.      https://www.caregiver.org/caregivers-guide-understanding-dementia-behaviors        This fact sheet was prepared by [facility] and was reviewed by NEYMAR Merino, education and  and specialist in dementia care.   2004, 2008, 2016 [facility] [place]. All rights reserved.        Find a [employer]    Https://www.alz.org/events/event_search?[email or web address]    General Information    Http://www.actonalz.org/  Alzheimers Association 24/7 Helpline  987.669.3486

## 2020-06-10 ASSESSMENT — ANXIETY QUESTIONNAIRES: GAD7 TOTAL SCORE: 1

## 2020-06-10 NOTE — PROGRESS NOTES
Clinic Care Coordination Contact  [facility]/Voicemail    Clinical Data: CHW Outreach attempted x 1.  Left message on patient's son's voicemail with call back information and requested return call.  Plan: CHW will try to reach patient's son, Kendall, again in 1-2 business days.    Rika GHOTRA Community Health Worker  [facility] Care Coordination  [facility]  Phone: [phone]

## 2020-06-11 ENCOUNTER — HOSPITAL ENCOUNTER (OUTPATIENT)
Dept: BONE DENSITY | Facility: CLINIC | Age: 85
Discharge: HOME | End: 2020-06-11
Attending: INTERNAL MEDICINE | Admitting: INTERNAL MEDICINE
Payer: MEDICARE

## 2020-06-11 DIAGNOSIS — M85.89 OSTEOPENIA OF MULTIPLE SITES: ICD-10-CM

## 2020-06-11 PROCEDURE — 77080 DXA BONE DENSITY AXIAL: CPT

## 2020-06-11 NOTE — PROGRESS NOTES
Clinic Care Coordination Contact  Community Health Worker Initial Outreach    CHW Initial Information Gathering:  Current living arrangement:: I live in a private home  Informal Support system:: Family, Children  No PCP office visit in Past Year: Yes    Patient accepts CC: Yes     The Clinic Community Health Worker spoke with the patient's son, Kendall, today to discuss possible Clinic Care Coordination enrollment. The service was described and immediate needs were discussed to include concerns with patient living alone. The patient's son agreed to enrolling patient and an assessment was scheduled. Kendall was provided with contact information for the clinic CHW.             Assessment date: 6/15 @2pm    Primary caregivers: Kendall, son ( @[phone])  and his spouse.  Kendall stated that he visits and checks on patient every day for approx. 1 hour. Patient would like to continue living independently, but family feels that they need to start having other options and more discussions about her care and well being.     Rika GHOTRA Community Health Worker  [facility] Clinic Care Coordination  [facility] [place]  Phone: [phone]

## 2020-06-30 ENCOUNTER — VIRTUAL VISIT (OUTPATIENT)
Dept: FAMILY MEDICINE | Facility: CLINIC | Age: 85
End: 2020-06-30
Payer: MEDICARE

## 2020-06-30 DIAGNOSIS — I10 ESSENTIAL HYPERTENSION, BENIGN: ICD-10-CM

## 2020-06-30 DIAGNOSIS — M81.0 AGE-RELATED OSTEOPOROSIS WITHOUT CURRENT PATHOLOGICAL FRACTURE: Primary | ICD-10-CM

## 2020-06-30 PROCEDURE — 99214 OFFICE O/P EST MOD 30 MIN: CPT | Mod: 95 | Performed by: INTERNAL MEDICINE

## 2020-06-30 NOTE — PATIENT INSTRUCTIONS
You should start therapy for osteoporosis with a bisphosphonate (Actonel, Fosamax or Reclast).    --Your risk of hip or spine fracture is elevated.  We can calculate your individual risk.  --The most common side effect of bisphosphonates is stomach irritation (less than 7%). Many people with history of GERD or ulcers can tolerate these medications fine.  There are specific instructions on how to take the medication and this reduces the chance of GI irritation.  --Another common side effects is muscle/bone pain (less than 6%).    Very rare side effects of bisphosphonates include osteonecrosis of the jaw and atypical thigh (Femur) fractures.  --your risk of osteonecrosis of the jaw is 1 in 10,000.  --your risk of atypical femur fracture is 1 in 100,000  --your lifetime risk of getting struck by lightening is 1 in 3,000

## 2020-06-30 NOTE — PROGRESS NOTES
"Gabriela Agrawal is a 86 year old female who is being evaluated via a billable video visit.      The patient has been notified of following:     \"This video visit will be conducted via a call between you and your physician/provider. We have found that certain health care needs can be provided without the need for an in-person physical exam.  This service lets us provide the care you need with a video conversation.  If a prescription is necessary we can send it directly to your pharmacy.  If lab work is needed we can place an order for that and you can then stop by our lab to have the test done at a later time.    Video visits are billed at different rates depending on your insurance coverage.  Please reach out to your insurance provider with any questions.    If during the course of the call the physician/provider feels a video visit is not appropriate, you will not be charged for this service.\"    Patient has given verbal consent for Video visit? Yes  How would you like to obtain your AVS? MyChart  Patient would like the video invitation sent by: Send to e-mail at: [email or web address]  Will anyone else be joining your video visit? Yes: Dinah (Son) Authorization on file. How would they like to receive their invitation? Text to cell phone: [phone]     Pt and Son will use My-chart.      Subjective     Gabriela Agrawal is a 86 year old female who presents today via video visit for the following health issues:    HPI     Results:     osteoporosis -     IMPRESSION:  1. Osteoporosis with high fracture risk. There has been a statistically significant decline in the average bone mineral density of the bilateral hips since the most recent prior study.  2. Evaluation of the lumbar spine is limited due to degenerative sclerosis at L3 and L4.          Video Start Time: 12:34 PM        Current Outpatient Medications   Medication Sig Dispense Refill     Calcium Carbonate-Vitamin D (CALCIUM + D PO) Take 1 tablet by mouth daily.   "     cholecalciferol (VITAMIN D) 1000 UNIT tablet Take 1 tablet by mouth daily  100 tablet 12     donepezil (ARICEPT) 10 MG tablet Take 1 tablet (10 mg) by mouth At Bedtime 90 tablet 3     donepezil (ARICEPT) 5 MG tablet Take 1 tablet (5 mg) by mouth At Bedtime 30 tablet 0     losartan (COZAAR) 100 MG tablet Take 1 tablet (100 mg) by mouth daily 90 tablet 3     MULTI-VITAMIN OR TABS 1 TABLET DAILY       vitamin E (TOCOPHEROL) 1000 units (900 mg) capsule Take 2 capsules (2,000 Units) by mouth daily 180 capsule 3       Reviewed and updated as needed this visit by Provider         Review of Systems   Constitutional, HEENT, cardiovascular, pulmonary, gi and gu systems are negative, except as otherwise noted.      Objective             Physical Exam     GENERAL: Healthy, alert and no distress  EYES: Eyes grossly normal to inspection.  No discharge or erythema, or obvious scleral/conjunctival abnormalities.  RESP: No audible wheeze, cough, or visible cyanosis.  No visible retractions or increased work of breathing.    SKIN: Visible skin clear. No significant rash, abnormal pigmentation or lesions.  NEURO: Cranial nerves grossly intact.  Mentation and speech appropriate for age.  PSYCH: Mentation appears normal, affect normal/bright, judgement and insight intact, normal speech and appearance well-groomed.            Assessment & Plan       ICD-10-CM    1. Age-related osteoporosis without current pathological fracture  M81.0    2. Essential hypertension, benign  I10         A long discussion about the risks and benefits of bisphosphonates for treatment of osteoporosis.  Discussed calcium, vitamin D, weightbearing exercises, fall  prevention.  She is unsure if she wants to start the medication and wants to do more thinking.  If she decides to start either Actonel or Fosamax, she can send a Coub message and we will send either 1 based on her insurance formulary.  Son was present throughout the visit    Patient Instructions            You should start therapy for osteoporosis with a bisphosphonate (Actonel, Fosamax or Reclast).    --Your risk of hip or spine fracture is elevated.  We can calculate your individual risk.  --The most common side effect of bisphosphonates is stomach irritation (less than 7%). Many people with history of GERD or ulcers can tolerate these medications fine.  There are specific instructions on how to take the medication and this reduces the chance of GI irritation.  --Another common side effects is muscle/bone pain (less than 6%).    Very rare side effects of bisphosphonates include osteonecrosis of the jaw and atypical thigh (Femur) fractures.  --your risk of osteonecrosis of the jaw is 1 in 10,000.  --your risk of atypical femur fracture is 1 in 100,000  --your lifetime risk of getting struck by lightening is 1 in 3,000            Return in about 1 year (around 6/30/2021) for Annual Wellness Check-Up.    Coco Mejia DO  [facility]      Video-Visit Details    Type of service:  Video Visit    Video End Time:12:52 PM    Originating Location (pt. Location): Home    Distant Location (provider location):  [facility]     Platform used for Video Visit: Maninder    Return in about 1 year (around 6/30/2021) for Annual Wellness Check-Up.       Coco Mejia DO

## 2020-07-01 ENCOUNTER — PATIENT OUTREACH (OUTPATIENT)
Dept: CARE COORDINATION | Facility: CLINIC | Age: 85
End: 2020-07-01

## 2020-07-01 NOTE — PROGRESS NOTES
Clinic Care Coordination Contact    Follow Up Progress Note    Proactive follow up outreach    Assessment: Spoke to Kendall, patient's son for follow up outreach. Kendall reports patient is about the same. No acute health concerns and primary focus is maintaining her safety due to memory impairment. He reports some days she is completely alert and oriented and other days she struggles to navigate how to use the phone and call her pharmacy to request a refill. Family remains very closely involved and checks in with patient daily.     We reviewed the home care resources discussed at last call. Kendall reports they did reach out to 4 home care agencies and plan to schedule consults with [facility] and [facility] home cares.  He is hoping they can start with home care services and continue search for transition to senior living facility in a non-urgent but mindful manner.  Kendall and his sister have been trying to contact [facility] to discuss their availability in assisted living.  Kendall had a few questions about long-term planning once patient would run out of savings, 10-15 years after residing in [facility]. RN CC answered to best ability and provided additional resources to navigating planning. Kendall verbalized understanding and was appreciative of information.    Goals addressed this encounter:   Goals Addressed                 This Visit's Progress      #1 Functional (pt-stated)   10%     Goal Statement: I will continue to live independently in the community and consider support/resources to maintain safety.  Date Goal set: 6/15/20   Barriers: medical history  Strengths: supportive family  Date to Achieve By: 10/1/20  Patient expressed understanding of goal: Yes  Action steps to achieve this goal:  1. I will maintain contact with care coordinator to discuss home resources and community support as needed.  2. I will attend routine clinic follow up with my PCP.  3. I will consider a Lifeline or fall safety pendant for additional home safety  support.               Intervention/Education provided during outreach: reviewed home care resources shared during initial assessment call as well as answered long-term planning questions within ability and scope of practice     Outreach Frequency: monthly    Plan:   1) Patient/family will continue to pursue home care services by scheduling consult visits with desired agencies.  Family will continue to pursue assisted housing for more longer term planning as already in place.    Community Health Worker (CHW) will follow up with patient in 4 weeks. CHW will involve RN Care Coordinator as needed or if patient is ready to close to care coordination.    Care Coordinator will continue to monitor progression of goals every 6 weeks.    VALERIA TorresN, RN   [facility]  - Clinic Care Coordinator  Phone: [phone]

## 2020-08-06 ENCOUNTER — PATIENT OUTREACH (OUTPATIENT)
Dept: NURSING | Facility: CLINIC | Age: 85
End: 2020-08-06
Payer: MEDICARE

## 2020-08-06 NOTE — PROGRESS NOTES
Clinic Care Coordination Contact    Follow Up Progress Note    Proactive follow up outreach    Assessment: ABDELRAHMAN COX spoke to Kendall, patient's son for proactive follow up. He reports no acute health concerns and primary focus continues to be maintaining patient's safety due to memory impairment.  Family remains very closely involved and checks in with patient daily. He just returned home from visiting her.    We touched based regarding both home care and [facility]/memory care facility resources. Kendall and his sisters continue to have conversations surrounding future planning. He feels they have enough information at this point and they just need to make decisions for which home care agencies and/or facilities they want to pursue.      Goals addressed this encounter:   Goals Addressed                 This Visit's Progress      #1 Functional (pt-stated)   100%     Goal Statement: I will continue to live independently in the community and consider support/resources to maintain safety.  Date Goal set: 6/15/20   Barriers: medical history  Strengths: supportive family  Date to Achieve By: 10/1/20  Patient expressed understanding of goal: Yes  Action steps to achieve this goal:  1. I will maintain contact with care coordinator to discuss home resources and community support as needed.  2. I will attend routine clinic follow up with my PCP.  3. I will consider a Lifeline or fall safety pendant for additional home safety support.               Intervention/Education provided during outreach: reviewed plan of care. Family feels they have sufficient knowledge of resources at this time. RN CC explained we would set outreach to 2 months and ensure they continue to feel like they have enough support for advance care planning.     Outreach Frequency: 2 months    Plan:   Patient/family will continue to pursue home care services by scheduling consult visits with desired agencies.  Family will continue to pursue [facility] housing for more longer term  planning as already in place.  Care Coordinator will follow up in 2 months.    THAIS Torres, RN   [facility]  - Clinic Care Coordinator  Phone: [phone]

## 2020-10-02 ENCOUNTER — PATIENT OUTREACH (OUTPATIENT)
Dept: CARE COORDINATION | Facility: CLINIC | Age: 85
End: 2020-10-02

## 2020-10-02 NOTE — PROGRESS NOTES
Clinic Care Coordination Contact    Assessment: Care Coordinator contacted patient's son Kendall for 2 month follow up (consent to communicate on file). Left voicemail encouraging call back if any concerns or new needs arise.  Per chart review, no additional health care visits have occurred since last outreach with Kendall on 8/6. At that time, family felt they had sufficient understanding of community resources available to navigate long-term planning.      Enrollment status: Graduated.      Plan: No further outreaches at this time.  Patient will continue to follow the plan of care.  If new needs arise a new Care Coordination referral may be placed.     THAIS Torres, RN   [facility]  - Clinic Care Coordinator  Phone: [phone]

## 2020-10-31 ENCOUNTER — MYC REFILL (OUTPATIENT)
Dept: FAMILY MEDICINE | Facility: CLINIC | Age: 85
End: 2020-10-31

## 2020-10-31 DIAGNOSIS — F03.90 SENILE DEMENTIA WITHOUT BEHAVIORAL DISTURBANCE (H): ICD-10-CM

## 2020-10-31 RX ORDER — DONEPEZIL HYDROCHLORIDE 10 MG/1
10 TABLET, FILM COATED ORAL AT BEDTIME
Qty: 90 TABLET | Refills: 3 | Status: CANCELLED | OUTPATIENT
Start: 2020-10-31

## 2020-11-18 ENCOUNTER — OFFICE VISIT (OUTPATIENT)
Dept: NEUROLOGY | Facility: CLINIC | Age: 85
End: 2020-11-18
Attending: INTERNAL MEDICINE
Payer: MEDICARE

## 2020-11-18 VITALS
WEIGHT: 150 LBS | TEMPERATURE: 97.7 F | HEIGHT: 67 IN | DIASTOLIC BLOOD PRESSURE: 66 MMHG | BODY MASS INDEX: 23.54 KG/M2 | SYSTOLIC BLOOD PRESSURE: 140 MMHG | HEART RATE: 88 BPM | RESPIRATION RATE: 20 BRPM

## 2020-11-18 DIAGNOSIS — F03.90 DEMENTIA WITHOUT BEHAVIORAL DISTURBANCE, UNSPECIFIED DEMENTIA TYPE: Primary | ICD-10-CM

## 2020-11-18 PROCEDURE — 99204 OFFICE O/P NEW MOD 45 MIN: CPT | Performed by: PSYCHIATRY & NEUROLOGY

## 2020-11-18 ASSESSMENT — MONTREAL COGNITIVE ASSESSMENT (MOCA)
12. MEMORY INDEX SCORE: 0
13. ORIENTATION SUBSCORE: 2
6. READ LIST OF DIGITS [FORWARD/BACKWARD]: 2
4. NAME EACH OF THE THREE ANIMALS SHOWN: 3
VISUOSPATIAL/EXECUTIVE SUBSCORE: 5
8. SERIAL SUBTRACTION OF 7S: 2
WHAT LEVEL OF EDUCATION WAS ATTAINED: 0
11. FOR EACH PAIR OF WORDS, WHAT CATEGORY DO THEY BELONG TO (OUT OF 2): 1
7. [VIGILENCE] TAP WHEN HEARING DESIGNATED LETTER: 1
9. REPEAT EACH SENTENCE: 2
10. [FLUENCY] NAME WORDS STARTING WITH DESIGNATED LETTER: 0
WHAT IS THE TOTAL SCORE (OUT OF 30): 18

## 2020-11-18 ASSESSMENT — MIFFLIN-ST. JEOR: SCORE: 1148.03

## 2020-11-18 NOTE — LETTER
"    11/18/2020         RE: Gabriela Agrawal  [id number] [facility] [address]        Dear Colleague,    Thank you for referring your patient, Gabriela Agrawal, to the [facility] [place]. Please see a copy of my visit note below.    Chief Complaint   Patient presents with     Dementia       Vitals:    11/18/20 0901   BP: (!) 140/66   BP Location: Right arm   Patient Position: Sitting   Cuff Size: Adult Regular   Pulse: 88   Resp: 20   Temp: 97.7  F (36.5  C)   TempSrc: Tympanic   Weight: 68 kg (150 lb)   Height: 1.702 m (5' 7\")     Wt Readings from Last 1 Encounters:   11/18/20 68 kg (150 lb)       Claudia TUTTLE RN   Specialty Clinics       INITIAL NEUROLOGY CONSULTATION    DATE OF VISIT: 11/18/2020  MRN: [id number]  PATIENT NAME: Gabriela Agrawal  YOB: 1933    REFERRING PROVIDER: Ccoo Mejia    Chief Complaint   Patient presents with     Dementia       SUBJECTIVE:                                                      HPI:   Gabriela Agrawal is a 87 year old female whom I have been asked by Dr. Mejia to see in consultation for    Per Dr. Fernández's 6.1.20 Assessment:  SUMMARY:  The following opinions and recommendations are based on the interview and formal testing data obtained via telephone, thus all results were interpreted with caution.  In summary, the neuropsychological assessment results indicated evidence for severe deficits with memory, including evidence for rapid forgetting of previously learned information.  There was also evidence for deficits with encoding and retrieval of previously learned verbal information.  Structure and recognition formats did not significantly improve her verbal memory performance.  There was also evidence for slowed speed of information processing and for significant deficits in executive functioning, including cognitive flexibility, verbal fluency, insight, and judgment including limited awareness of basic health and safety.  " Language functioning and visual-spatial abilities were variable.   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   The specific etiology of her cognitive decline is not possible based on this evaluation alone. These results indicated that she at high risk for further decline in cognitive functioning.  The patient also endorsed moderate to severe symptoms of depression on a self-report measure, consistent with a depressive disorder.  However, the pattern and extent of her cognitive difficulties is greater than could be accounted for by this etiology alone.      RECOMMENDATIONS:   1.  Given these results, a referral for neurological consultation is strongly recommended.  2. Safety is a significant concern, particularly given her memory and executive functioning deficits.  She will likely require a high level of supervision to her daily activities, and 24-hour supervision is recommended.  It is recommended that the patient not reside alone.  3. The results of the evaluation also indicated that she has limited capacity for informed personal and medical decision making given her significant memory and executive functioning deficits.  Therefore, utilization of a power of  is recommended in order to ensure the patient's best interest.  4. Potentially dangerous activities, such as driving, should be avoided due to the significantly increased risk of accidents related to her serious cognitive deficits.  5.  Other potentially dangerous activities such as cooking should be avoided or closely supervised    6. Pharmacological intervention to address the cognitive decline should be considered, if not medically contraindicated.    7. A referral for cognitive rehabilitation therapy is recommended to address compensatory strategies for the above noted cognitive difficulties. One option for this service would be Lupe Castro MA, CCC-SLP at [facility]  Center at [email or web address]  8. A full medical evaluation of any treatable causes of cognitive decline is also recommended, if this has not already been accomplished.  Given the significant cognitive decline, a full evaluation of any infectious processes, vitamin deficiencies or other metabolic abnormalities is recommended, to rule out these as possible contributors.   9. The patient would benefit from case management and in-home services to assist her in compensating for her cognitive difficulties. Thus, a referral to the Community Access for Disability Inclusion (CADI) Waiver program is recommended, given her psychiatric and cognitive issues. Utilization of an adult rehabilitative mental health services (ARMHS) worker would be particularly beneficial for her.   10. Given her significant depressive symptoms, a referral for psychiatric consultation is recommended as well. Given the extent of her memory difficulties, psychotherapeutic intervention likely would not be beneficial.   11. Have a trusted loved one present during medical appointments to facilitate memory for information provided and/or obtain written outlines of information to reduce demands on verbal memory.  12. A referral for a support group for individuals with cognitive decline and their families, such as the Alzheimer s [employer], is also recommended. The link for the [place] chapter of this organization is https://www.alz.org/mnnd and the phone number is 1-188.643.9603.  13. The results of the evaluation now constitute a baseline of the patient s cognitive functioning.  Given the evidence for significant deficits in cognitive functioning, a referral for a neuropsychological reevaluation in approximately one year is recommended in order to clarify the differential diagnosis, document any changes in cognitive functioning, and provide further recommendations and prognosis to the  patient, family and treatment team.     Dr. Mejia reviewed the results of the testing with Gabriela and her son.   Per her 6.9.20 recommendations:  Patient Instructions   1. Referral to Neurology  2. Start Aricept (Donepezil) 5 mg daily.  After 1 month if tolerating well, increase to 10 mg.  GI side effects or insomnia are the most common side effects   3. Recommend to start Vitamin E 2000 units  once daily.  It has been shown to slow functional decline in Alzheimer's Disease without increased risk of serious side effects.  4. Referral to Care Coordinator for help with social support resources.  Consider Life Alert, remote monitoring with cameras, daily, ideally twice daily checks by family members.    5. Consider Elder Care    6. Use a pillbox.  Have family check this daily.    7. Recommend to stop aspirin and statin.  Continue losartan, Calcium and Vitamin D, Multivitamin     Care coordinator has since been in touch with the patient. B12 was high in 3.2020; TSH in normal range. Brain MRI was unremarkable then.     Gabriela is here with her son. He checks on her daily.  He is still living alone.  They confirm that they have been working with the care coordinator. They are looking into some home care options but son does not feel like it is quite necessary yet.  He is concerned that he and his siblings, if they were not available for Gabriela, they do not have a backup plan.  They are working on this.    She completed high school. She worked as a  and retired in her early 60's. She says sleep is restful. Appetite is not great. Son says she likes to snack. Family also buys her Boost. Mood has been okay, she says. Son says the weather makes a difference, meaning she is better when the sun is shining.  On days where there are a lot of things to do, he notices she can get a little more frustrated.  Gabriela agrees.    Son says the memory problems started about 2 years ago.  She had an episode of confusion on  Jarad cespedes day.  She was at the son's house, but that she was in her own home.  Since then, he and his siblings have noticed the short-term memory issues.  She repeats conversations.  She is able to do most of her daily tasks at home.  Someone comes in and helps her clean.  She had trouble doing her taxes this year, which was a new problem.  She otherwise handles her finances without difficulty.  She no longer drives.  They plan to sell her car.  Gabriela says she was not comfortable driving anyway.    She is taking the 10mg evening dose of the Aricept.  She denies side effects.    Physically she feels good.  She does have some arthritis and problems with her knees.  Otherwise no change in mobility or abnormal motor symptoms.  Recent falls.  She says her balance is not great.      Past Medical History:   Diagnosis Date     Basal cell carcinoma      Bronchiolitis obliterans (H)      Diffuse cystic mastopathy     Bresat Fibrocystic Disease     Disorder of bone and cartilage, unspecified      Malignant melanoma (H)      Nonspecific (abnormal) findings on radiological and other examination of lung field      Other abnormal blood chemistry      Other specified alveolar and parietoalveolar pneumonopathies      Squamous cell carcinoma      Syncope and collapse     2007 holter neg     Past Surgical History:   Procedure Laterality Date     APPENDECTOMY OPEN       BREAST SURGERY      biopsy     COLONOSCOPY  2/8/2013    Procedure: COLONOSCOPY;  Colonoscopy  ;  Surgeon: Norah Whaley MD;  Location: [facility]     EYE SURGERY      keratoplasty     HERNIA REPAIR      left inginal     HYSTERECTOMY      including ovaries     KNEE SURGERY      2010, 2009     LUNG SURGERY      biopsy     melanoma  resection              Calcium Carbonate-Vitamin D (CALCIUM + D PO), Take 1 tablet by mouth daily.       cholecalciferol (VITAMIN D) 1000 UNIT tablet, Take 1 tablet by mouth daily        donepezil (ARICEPT) 10 MG tablet, Take 1  "tablet (10 mg) by mouth At Bedtime       losartan (COZAAR) 100 MG tablet, Take 1 tablet (100 mg) by mouth daily       MULTI-VITAMIN OR TABS, 1 TABLET DAILY       vitamin E (TOCOPHEROL) 1000 units (900 mg) capsule, Take 2 capsules (2,000 Units) by mouth daily    No current facility-administered medications on file prior to visit.     Allergies   Allergen Reactions     Nkda [No Known Drug Allergies]         Problem (# of Occurrences) Relation (Name,Age of Onset)    Cancer (1) Other (aunte  (mom))    Family History Negative (1) Other        Social History     Tobacco Use     Smoking status: Former Smoker     Packs/day: 1.50     Years: 22.50     Pack years: 33.75     Types: Cigarettes     Quit date: 1982     Years since quittin.9     Smokeless tobacco: Never Used   Substance Use Topics     Alcohol use: Yes     Comment: 4-6 a week     Drug use: No       REVIEW OF SYSTEMS:                                                      10-point review of systems is negative except as mentioned above in HPI.     EXAM:                                                      Physical Exam:   Vitals: BP (!) 140/66 (BP Location: Right arm, Patient Position: Sitting, Cuff Size: Adult Regular)   Pulse 88   Temp 97.7  F (36.5  C) (Tympanic)   Resp 20   Ht 1.702 m (5' 7\")   Wt 68 kg (150 lb)   BMI 23.49 kg/m    BMI= Body mass index is 23.49 kg/m .  GENERAL: NAD.  HEENT: NC/AT.   CV: RRR. S1, S2.   NECK: No bruits.  PULM: Non-labored breathing.   Neurologic:  MENTAL STATUS: Alert, attentive. Speech is fluent. Fair comprehension. MoCA: 20/30 (normal is 26 and above). Fair concentration. Fair fund of knowledge.   CRANIAL NERVES: Discs technically difficult to visualize. Visual fields intact to confrontation. Pupils equally, round and reactive to light. Facial sensation and movement normal. EOM full. Hard of hearing. Trapezius strength intact. Palate moves symmetrically. Tongue midline.  MOTOR: 5/5 in proximal and distal muscle " groups of upper and lower extremities. Tone and bulk normal.   DTRs: Intact and symmetric in biceps, BR, patellae.  Babinski down-going on the right, equivocal on the left (ticklish?).   SENSATION: Normal light touch and pinprick. Intact proprioception at great toes. Vibration: Diminished both ankles (Rightworse>Left).   COORDINATION: Normal finger nose finger. Finger tapping normal. Knee heel shin normal.  STATION AND GAIT: Romberg negative. Good postural reflexes. Casual gait is normal, slightly antalgic. Tandem minimally unsteady.  Right hand-dominant.    Relevant Data:  MRI Brain (3.10.20):  IMPRESSION: Diffuse cerebral volume loss and cerebral white matter  changes consistent with chronic small vessel ischemic disease. No  evidence for acute intracranial pathology.    Imaging reviewed by me. Agree with Radiology read.       ASSESSMENT and PLAN:                                                      Assessment:     ICD-10-CM    1. Dementia without behavioral disturbance, unspecified dementia type (H)  F03.90         Ms. Agrawal is a pleasant 87-year-old woman with history of hypertension, hyperlipidemia and depression here for evaluation of dementia.  Work-up has been completed by her primary care provider already.  We reviewed the results of the neuropsych assessment and her brain MRI.  She is already on track working with our care coordinator, and has a lot of family support.  She is tolerating the Aricept well so I recommend she continue this.  We will consider adding Namenda in the future.  Son will continue to monitor, and let us know if family sees any major change in Gabriela.  Otherwise we will plan on following up in 6 months.    Plan:  -- Continue the Aricept (donepezil): 10 mg at bedtime.  -- Continue to work with our care coordinators for resources.  -- Return to Neurology clinic in 6 months.  -- Please let us know if any concerns arise in the meantime.    Total Time: 45 minutes were spent with the patient  and in chart review/documentation. More than 50% of the time spent on counseling (as described above in Assessment and Plan/Instructions) /coordinating the care.    Екатерина Mckeon MD  Neurology    CC: Coco Mejia, DO        Again, thank you for allowing me to participate in the care of your patient.        Sincerely,        Екатерина Mckeon MD

## 2020-11-18 NOTE — PROGRESS NOTES
INITIAL NEUROLOGY CONSULTATION    DATE OF VISIT: 11/18/2020  MRN: [id number]  PATIENT NAME: Gabriela Agrawal  YOB: 1933    REFERRING PROVIDER: Coco Mejia    Chief Complaint   Patient presents with     Dementia       SUBJECTIVE:                                                      HPI:   Gabriela Agrawal is a 87 year old female whom I have been asked by Dr. Mejia to see in consultation for    Per Dr. Fernández's 6.1.20 Assessment:  SUMMARY:  The following opinions and recommendations are based on the interview and formal testing data obtained via telephone, thus all results were interpreted with caution.  In summary, the neuropsychological assessment results indicated evidence for severe deficits with memory, including evidence for rapid forgetting of previously learned information.  There was also evidence for deficits with encoding and retrieval of previously learned verbal information.  Structure and recognition formats did not significantly improve her verbal memory performance.  There was also evidence for slowed speed of information processing and for significant deficits in executive functioning, including cognitive flexibility, verbal fluency, insight, and judgment including limited awareness of basic health and safety.  Language functioning and visual-spatial abilities were variable.   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   The specific etiology of her cognitive decline is not possible based on this evaluation alone. These results indicated that she at high risk for further decline in cognitive functioning.  The patient also endorsed moderate to severe symptoms of depression on a self-report measure, consistent with a depressive disorder.  However, the pattern and extent of her cognitive difficulties is greater than could be accounted for by this etiology alone.      RECOMMENDATIONS:   1.  Given these results, a  referral for neurological consultation is strongly recommended.  2. Safety is a significant concern, particularly given her memory and executive functioning deficits.  She will likely require a high level of supervision to her daily activities, and 24-hour supervision is recommended.  It is recommended that the patient not reside alone.  3. The results of the evaluation also indicated that she has limited capacity for informed personal and medical decision making given her significant memory and executive functioning deficits.  Therefore, utilization of a power of  is recommended in order to ensure the patient's best interest.  4. Potentially dangerous activities, such as driving, should be avoided due to the significantly increased risk of accidents related to her serious cognitive deficits.  5.  Other potentially dangerous activities such as cooking should be avoided or closely supervised    6. Pharmacological intervention to address the cognitive decline should be considered, if not medically contraindicated.    7. A referral for cognitive rehabilitation therapy is recommended to address compensatory strategies for the above noted cognitive difficulties. One option for this service would be Lupe Castro MA, CCC-SLP at [facility] at [email or web address]  8. A full medical evaluation of any treatable causes of cognitive decline is also recommended, if this has not already been accomplished.  Given the significant cognitive decline, a full evaluation of any infectious processes, vitamin deficiencies or other metabolic abnormalities is recommended, to rule out these as possible contributors.   9. The patient would benefit from case management and in-home services to assist her in compensating for her cognitive difficulties. Thus, a referral to the Community Access for Disability Inclusion (CADI) Waiver program is recommended, given her  psychiatric and cognitive issues. Utilization of an adult rehabilitative mental health services (ARMHS) worker would be particularly beneficial for her.   10. Given her significant depressive symptoms, a referral for psychiatric consultation is recommended as well. Given the extent of her memory difficulties, psychotherapeutic intervention likely would not be beneficial.   11. Have a trusted loved one present during medical appointments to facilitate memory for information provided and/or obtain written outlines of information to reduce demands on verbal memory.  12. A referral for a support group for individuals with cognitive decline and their families, such as the Alzheimer s Association, is also recommended. The link for the [place] chapter of this organization is [email or web address] and the phone number is 1-589.926.8648.  13. The results of the evaluation now constitute a baseline of the patient s cognitive functioning.  Given the evidence for significant deficits in cognitive functioning, a referral for a neuropsychological reevaluation in approximately one year is recommended in order to clarify the differential diagnosis, document any changes in cognitive functioning, and provide further recommendations and prognosis to the patient, family and treatment team.     Dr. Mejia reviewed the results of the testing with Gabriela and her son.   Per her 6.9.20 recommendations:  Patient Instructions   1. Referral to Neurology  2. Start Aricept (Donepezil) 5 mg daily.  After 1 month if tolerating well, increase to 10 mg.  GI side effects or insomnia are the most common side effects   3. Recommend to start Vitamin E 2000 units  once daily.  It has been shown to slow functional decline in Alzheimer's Disease without increased risk of serious side effects.  4. Referral to Care Coordinator for help with social support resources.  Consider Life Alert, remote monitoring with cameras, daily, ideally twice  daily checks by family members.    5. Consider Elder Care    6. Use a pillbox.  Have family check this daily.    7. Recommend to stop aspirin and statin.  Continue losartan, Calcium and Vitamin D, Multivitamin     Care coordinator has since been in touch with the patient. B12 was high in 3.2020; TSH in normal range. Brain MRI was unremarkable then.     Gabriela is here with her son. He checks on her daily.  He is still living alone.  They confirm that they have been working with the care coordinator. They are looking into some home care options but son does not feel like it is quite necessary yet.  He is concerned that he and his siblings, if they were not available for Gabriela, they do not have a backup plan.  They are working on this.    She completed high school. She worked as a  and retired in her early 60's. She says sleep is restful. Appetite is not great. Son says she likes to snack. Family also buys her Boost. Mood has been okay, she says. Son says the weather makes a difference, meaning she is better when the sun is shining.  On days where there are a lot of things to do, he notices she can get a little more frustrated.  Gabriela agrees.    Son says the memory problems started about 2 years ago.  She had an episode of confusion on Jarad coy day.  She was at the son's house, but that she was in her own home.  Since then, he and his siblings have noticed the short-term memory issues.  She repeats conversations.  She is able to do most of her daily tasks at home.  Someone comes in and helps her clean.  She had trouble doing her taxes this year, which was a new problem.  She otherwise handles her finances without difficulty.  She no longer drives.  They plan to sell her car.  Gabriela says she was not comfortable driving anyway.    She is taking the 10mg evening dose of the Aricept.  She denies side effects.    Physically she feels good.  She does have some arthritis and problems with her knees.   Otherwise no change in mobility or abnormal motor symptoms.  Recent falls.  She says her balance is not great.      Past Medical History:   Diagnosis Date     Basal cell carcinoma      Bronchiolitis obliterans (H)      Diffuse cystic mastopathy     Bresat Fibrocystic Disease     Disorder of bone and cartilage, unspecified      Malignant melanoma (H)      Nonspecific (abnormal) findings on radiological and other examination of lung field      Other abnormal blood chemistry      Other specified alveolar and parietoalveolar pneumonopathies      Squamous cell carcinoma      Syncope and collapse     2007 holter neg     Past Surgical History:   Procedure Laterality Date     APPENDECTOMY OPEN       BREAST SURGERY      biopsy     COLONOSCOPY  2/8/2013    Procedure: COLONOSCOPY;  Colonoscopy  ;  Surgeon: Norah Whaley MD;  Location: [facility]     EYE SURGERY      keratoplasty     HERNIA REPAIR      left inginal     HYSTERECTOMY      including ovaries     KNEE SURGERY      2010, 2009     LUNG SURGERY      biopsy     melanoma  resection              Calcium Carbonate-Vitamin D (CALCIUM + D PO), Take 1 tablet by mouth daily.       cholecalciferol (VITAMIN D) 1000 UNIT tablet, Take 1 tablet by mouth daily        donepezil (ARICEPT) 10 MG tablet, Take 1 tablet (10 mg) by mouth At Bedtime       losartan (COZAAR) 100 MG tablet, Take 1 tablet (100 mg) by mouth daily       MULTI-VITAMIN OR TABS, 1 TABLET DAILY       vitamin E (TOCOPHEROL) 1000 units (900 mg) capsule, Take 2 capsules (2,000 Units) by mouth daily    No current facility-administered medications on file prior to visit.     Allergies   Allergen Reactions     Nkda [No Known Drug Allergies]         Problem (# of Occurrences) Relation (Name,Age of Onset)    Cancer (1) Other (aunte  (mom))    Family History Negative (1) Other        Social History     Tobacco Use     Smoking status: Former Smoker     Packs/day: 1.50     Years: 22.50     Pack years: 33.75     Types:  "Cigarettes     Quit date: 1982     Years since quittin.9     Smokeless tobacco: Never Used   Substance Use Topics     Alcohol use: Yes     Comment: 4-6 a week     Drug use: No       REVIEW OF SYSTEMS:                                                      10-point review of systems is negative except as mentioned above in HPI.     EXAM:                                                      Physical Exam:   Vitals: BP (!) 140/66 (BP Location: Right arm, Patient Position: Sitting, Cuff Size: Adult Regular)   Pulse 88   Temp 97.7  F (36.5  C) (Tympanic)   Resp 20   Ht 1.702 m (5' 7\")   Wt 68 kg (150 lb)   BMI 23.49 kg/m    BMI= Body mass index is 23.49 kg/m .  GENERAL: NAD.  HEENT: NC/AT.   CV: RRR. S1, S2.   NECK: No bruits.  PULM: Non-labored breathing.   Neurologic:  MENTAL STATUS: Alert, attentive. Speech is fluent. Fair comprehension. MoCA: 20/30 (normal is 26 and above). Fair concentration. Fair fund of knowledge.   CRANIAL NERVES: Discs technically difficult to visualize. Visual fields intact to confrontation. Pupils equally, round and reactive to light. Facial sensation and movement normal. EOM full. Hard of hearing. Trapezius strength intact. Palate moves symmetrically. Tongue midline.  MOTOR: 5/5 in proximal and distal muscle groups of upper and lower extremities. Tone and bulk normal.   DTRs: Intact and symmetric in biceps, BR, patellae.  Babinski down-going on the right, equivocal on the left (ticklish?).   SENSATION: Normal light touch and pinprick. Intact proprioception at great toes. Vibration: Diminished both ankles (Rightworse>Left).   COORDINATION: Normal finger nose finger. Finger tapping normal. Knee heel shin normal.  STATION AND GAIT: Romberg negative. Good postural reflexes. Casual gait is normal, slightly antalgic. Tandem minimally unsteady.  Right hand-dominant.    Relevant Data:  MRI Brain (3.10.20):  IMPRESSION: Diffuse cerebral volume loss and cerebral white matter  changes " consistent with chronic small vessel ischemic disease. No  evidence for acute intracranial pathology.    Imaging reviewed by me. Agree with Radiology read.       ASSESSMENT and PLAN:                                                      Assessment:     ICD-10-CM    1. Dementia without behavioral disturbance, unspecified dementia type (H)  F03.90         Ms. Agrawal is a pleasant 87-year-old woman with history of hypertension, hyperlipidemia and depression here for evaluation of dementia.  Work-up has been completed by her primary care provider already.  We reviewed the results of the neuropsych assessment and her brain MRI.  She is already on track working with our care coordinator, and has a lot of family support.  She is tolerating the Aricept well so I recommend she continue this.  We will consider adding Namenda in the future.  Son will continue to monitor, and let us know if family sees any major change in Gabriela.  Otherwise we will plan on following up in 6 months.    Plan:  -- Continue the Aricept (donepezil): 10 mg at bedtime.  -- Continue to work with our care coordinators for resources.  -- Return to Neurology clinic in 6 months.  -- Please let us know if any concerns arise in the meantime.    Total Time: 45 minutes were spent with the patient and in chart review/documentation. More than 50% of the time spent on counseling (as described above in Assessment and Plan/Instructions) /coordinating the care.    Екатерина Mckeon MD  Neurology    CC: Coco Mejia DO

## 2020-11-18 NOTE — PATIENT INSTRUCTIONS
Plan:  -- Continue the Aricept (donepezil): 10 mg at bedtime.  -- Continue to work with our care coordinators for resources.  -- Return to Neurology clinic in 6 months.  -- Please let us know if any concerns arise in the meantime.

## 2020-11-18 NOTE — PROGRESS NOTES
"Chief Complaint   Patient presents with     Dementia       Vitals:    11/18/20 0901   BP: (!) 140/66   BP Location: Right arm   Patient Position: Sitting   Cuff Size: Adult Regular   Pulse: 88   Resp: 20   Temp: 97.7  F (36.5  C)   TempSrc: Tympanic   Weight: 68 kg (150 lb)   Height: 1.702 m (5' 7\")     Wt Readings from Last 1 Encounters:   11/18/20 68 kg (150 lb)       Claudia TUTTLE RN   Specialty Clinics     "

## 2020-12-13 ENCOUNTER — HEALTH MAINTENANCE LETTER (OUTPATIENT)
Age: 85
End: 2020-12-13

## 2021-02-25 ENCOUNTER — IMMUNIZATION (OUTPATIENT)
Dept: FAMILY MEDICINE | Facility: CLINIC | Age: 86
End: 2021-02-25
Payer: MEDICARE

## 2021-02-25 PROCEDURE — 91300 PR COVID VAC PFIZER DIL RECON 30 MCG/0.3 ML IM: CPT

## 2021-02-25 PROCEDURE — 0001A PR COVID VAC PFIZER DIL RECON 30 MCG/0.3 ML IM: CPT

## 2021-03-11 ENCOUNTER — MYC MEDICAL ADVICE (OUTPATIENT)
Dept: FAMILY MEDICINE | Facility: CLINIC | Age: 86
End: 2021-03-11

## 2021-03-18 ENCOUNTER — IMMUNIZATION (OUTPATIENT)
Dept: FAMILY MEDICINE | Facility: CLINIC | Age: 86
End: 2021-03-18
Attending: FAMILY MEDICINE
Payer: MEDICARE

## 2021-03-18 PROCEDURE — 91300 PR COVID VAC PFIZER DIL RECON 30 MCG/0.3 ML IM: CPT

## 2021-03-18 PROCEDURE — 0002A PR COVID VAC PFIZER DIL RECON 30 MCG/0.3 ML IM: CPT

## 2021-04-01 ENCOUNTER — OFFICE VISIT (OUTPATIENT)
Dept: FAMILY MEDICINE | Facility: CLINIC | Age: 86
End: 2021-04-01
Payer: MEDICARE

## 2021-04-01 VITALS
HEART RATE: 99 BPM | WEIGHT: 152 LBS | RESPIRATION RATE: 16 BRPM | OXYGEN SATURATION: 98 % | TEMPERATURE: 97.6 F | DIASTOLIC BLOOD PRESSURE: 66 MMHG | SYSTOLIC BLOOD PRESSURE: 124 MMHG | BODY MASS INDEX: 23.81 KG/M2

## 2021-04-01 DIAGNOSIS — I10 ESSENTIAL HYPERTENSION, BENIGN: ICD-10-CM

## 2021-04-01 DIAGNOSIS — H10.12 ALLERGIC CONJUNCTIVITIS, LEFT: ICD-10-CM

## 2021-04-01 DIAGNOSIS — F03.90 SENILE DEMENTIA WITHOUT BEHAVIORAL DISTURBANCE (H): Primary | ICD-10-CM

## 2021-04-01 LAB
ANION GAP SERPL CALCULATED.3IONS-SCNC: 5 MMOL/L (ref 3–14)
BUN SERPL-MCNC: 15 MG/DL (ref 7–30)
CALCIUM SERPL-MCNC: 9.5 MG/DL (ref 8.5–10.1)
CHLORIDE SERPL-SCNC: 104 MMOL/L (ref 94–109)
CO2 SERPL-SCNC: 28 MMOL/L (ref 20–32)
CREAT SERPL-MCNC: 0.99 MG/DL (ref 0.52–1.04)
GFR SERPL CREATININE-BSD FRML MDRD: 51 ML/MIN/{1.73_M2}
GLUCOSE SERPL-MCNC: 99 MG/DL (ref 70–99)
POTASSIUM SERPL-SCNC: 4 MMOL/L (ref 3.4–5.3)
SODIUM SERPL-SCNC: 137 MMOL/L (ref 133–144)

## 2021-04-01 PROCEDURE — 36415 COLL VENOUS BLD VENIPUNCTURE: CPT | Performed by: INTERNAL MEDICINE

## 2021-04-01 PROCEDURE — 99214 OFFICE O/P EST MOD 30 MIN: CPT | Performed by: INTERNAL MEDICINE

## 2021-04-01 PROCEDURE — 80048 BASIC METABOLIC PNL TOTAL CA: CPT | Performed by: INTERNAL MEDICINE

## 2021-04-01 RX ORDER — LOSARTAN POTASSIUM 100 MG/1
100 TABLET ORAL DAILY
Qty: 90 TABLET | Refills: 3 | Status: SHIPPED | OUTPATIENT
Start: 2021-04-01 | End: 2023-05-30

## 2021-04-01 RX ORDER — DONEPEZIL HYDROCHLORIDE 10 MG/1
10 TABLET, FILM COATED ORAL AT BEDTIME
Qty: 90 TABLET | Refills: 3 | Status: SHIPPED | OUTPATIENT
Start: 2021-04-01

## 2021-04-01 ASSESSMENT — ANXIETY QUESTIONNAIRES
5. BEING SO RESTLESS THAT IT IS HARD TO SIT STILL: NOT AT ALL
3. WORRYING TOO MUCH ABOUT DIFFERENT THINGS: SEVERAL DAYS
7. FEELING AFRAID AS IF SOMETHING AWFUL MIGHT HAPPEN: SEVERAL DAYS
1. FEELING NERVOUS, ANXIOUS, OR ON EDGE: NOT AT ALL
IF YOU CHECKED OFF ANY PROBLEMS ON THIS QUESTIONNAIRE, HOW DIFFICULT HAVE THESE PROBLEMS MADE IT FOR YOU TO DO YOUR WORK, TAKE CARE OF THINGS AT HOME, OR GET ALONG WITH OTHER PEOPLE: NOT DIFFICULT AT ALL
6. BECOMING EASILY ANNOYED OR IRRITABLE: NOT AT ALL
2. NOT BEING ABLE TO STOP OR CONTROL WORRYING: NOT AT ALL
GAD7 TOTAL SCORE: 2

## 2021-04-01 ASSESSMENT — PATIENT HEALTH QUESTIONNAIRE - PHQ9
SUM OF ALL RESPONSES TO PHQ QUESTIONS 1-9: 4
5. POOR APPETITE OR OVEREATING: NOT AT ALL

## 2021-04-01 NOTE — LETTER
April 1, 2021      Gabrielakelli Agrawal  [id number] [address] [facility] [id number]        Dear ,    We are writing to inform you of your test results.  The kidney function is very mildly low, but not significantly different from previous values.  We will monitor yearly.     Resulted Orders   Basic metabolic panel   Result Value Ref Range    Sodium 137 133 - 144 mmol/L    Potassium 4.0 3.4 - 5.3 mmol/L    Chloride 104 94 - 109 mmol/L    Carbon Dioxide 28 20 - 32 mmol/L    Anion Gap 5 3 - 14 mmol/L    Glucose 99 70 - 99 mg/dL    Urea Nitrogen 15 7 - 30 mg/dL    Creatinine 0.99 0.52 - 1.04 mg/dL    GFR Estimate 51 (L) >60 mL/min/[1.73_m2]      Comment:      Non  GFR Calc  Starting 12/18/2018, serum creatinine based estimated GFR (eGFR) will be   calculated using the Chronic Kidney Disease Epidemiology Collaboration   (CKD-EPI) equation.      GFR Estimate If Black 59 (L) >60 mL/min/[1.73_m2]      Comment:       GFR Calc  Starting 12/18/2018, serum creatinine based estimated GFR (eGFR) will be   calculated using the Chronic Kidney Disease Epidemiology Collaboration   (CKD-EPI) equation.      Calcium 9.5 8.5 - 10.1 mg/dL       If you have any questions or concerns, please call the clinic at the number listed above.       Sincerely,      Coco Mejia DO

## 2021-04-01 NOTE — PROGRESS NOTES
Assessment & Plan     Senile dementia without behavioral disturbance (H) - agree with plan to transition to structured living environment.  Reviewed POLST in detail with patient and her son Kendall today.  Patient and son agree that she is DNR/DNI, but is amenable to limited treatment such as hospitalization or IV antibiotics if indicated.  No aggressive measures such as ICU cares should be done.    - donepezil (ARICEPT) 10 MG tablet; Take 1 tablet (10 mg) by mouth At Bedtime    Essential hypertension, benign -stable, refill provided  - losartan (COZAAR) 100 MG tablet; Take 1 tablet (100 mg) by mouth daily  - Basic metabolic panel    Allergic conjunctivitis, left -symptoms are mild and not particularly bothersome to the patient.  If they worsen, could consider Claritin or Pataday        Patient Instructions   1. We filled out the POLST form  2. Renewed meds, call pharmacy to fill  3. Labs today  4. Try Claritin for allergies. See eye doctor.  Can also try over the counter allergy eye drop - Pataday.  AVOID visine.      No follow-ups on file.    oCco Mejia, [facility]    Connie Ochoa is a 87 year old who presents for the following health issues  accompanied by her son Otoniel TIJERINA     Phq9/gad7:    Left eye : redness today and keep watering.  Frequent sneezing, family thinks it is seasonal allergies    Skin Cancer Screening:    Forms: [place] - Physician orders  POLST  --family is worried about Gabriela's safety  --Gabriela doesn't want to live at [facility] and does not share family's concerns. She would prefer to live alone in her apartment  --Neuropsych testing 6/2020 - recommended financial POA and healthcare POA.  24 hour supervision was recommended.  Driving was not recommended.  --she has financial POA document.      On 3/7/2020 -- PHI signed on file - authorizing share all with son and dtr: Otoniel Costello and Odalis Centeno      Pt will be admitt on  [facility] and need some paperwork completed by Dr. Mejia.        Review of Systems   Constitutional, HEENT, cardiovascular, pulmonary, GI, , musculoskeletal, neuro, skin, endocrine and psych systems are negative, except as otherwise noted.      Objective    /66   Pulse 99   Temp 97.6  F (36.4  C) (Tympanic)   Resp 16   Wt 68.9 kg (152 lb)   SpO2 98%   Breastfeeding No   BMI 23.81 kg/m    Body mass index is 23.81 kg/m .  Physical Exam   GENERAL APPEARANCE: alert and no distress  EYES: mild erythema of skin around left eye, mild conjunctival injection with increased tearing, no crusting, purulence.  PERRLA  HENT: MMM  NECK: no adenopathy, no asymmetry, masses, or scars and thyroid normal to palpation  RESP: lungs clear to auscultation - no rales, rhonchi or wheezes  CV: regular rates and rhythm, normal S1 S2, no S3 or S4 and no murmur, click or rub  LYMPHATICS: no leg edema  Psych: remote and immediate recall is impaired

## 2021-04-01 NOTE — PATIENT INSTRUCTIONS
1. We filled out the POLST form  2. Renewed meds, call pharmacy to fill  3. Labs today  4. Try Claritin for allergies. See eye doctor.  Can also try over the counter allergy eye drop - Pataday.  AVOID visine.

## 2021-04-02 ASSESSMENT — ANXIETY QUESTIONNAIRES: GAD7 TOTAL SCORE: 2

## 2021-04-03 ENCOUNTER — TELEPHONE (OUTPATIENT)
Dept: INTERNAL MEDICINE | Facility: CLINIC | Age: 86
End: 2021-04-03

## 2021-04-07 ENCOUNTER — DOCUMENTATION ONLY (OUTPATIENT)
Dept: OTHER | Facility: CLINIC | Age: 86
End: 2021-04-07

## 2021-04-13 ENCOUNTER — OFFICE VISIT (OUTPATIENT)
Dept: GENERAL RADIOLOGY | Facility: CLINIC | Age: 86
End: 2021-04-13
Attending: FAMILY MEDICINE
Payer: MEDICARE

## 2021-04-13 ENCOUNTER — OFFICE VISIT (OUTPATIENT)
Dept: FAMILY MEDICINE | Facility: CLINIC | Age: 86
End: 2021-04-13
Payer: MEDICARE

## 2021-04-13 VITALS
SYSTOLIC BLOOD PRESSURE: 122 MMHG | TEMPERATURE: 96.4 F | HEART RATE: 85 BPM | OXYGEN SATURATION: 97 % | WEIGHT: 152.6 LBS | BODY MASS INDEX: 23.9 KG/M2 | DIASTOLIC BLOOD PRESSURE: 80 MMHG | RESPIRATION RATE: 18 BRPM

## 2021-04-13 DIAGNOSIS — R07.89 LEFT-SIDED CHEST WALL PAIN: ICD-10-CM

## 2021-04-13 DIAGNOSIS — R07.89 LEFT-SIDED CHEST WALL PAIN: Primary | ICD-10-CM

## 2021-04-13 PROCEDURE — 99214 OFFICE O/P EST MOD 30 MIN: CPT | Performed by: FAMILY MEDICINE

## 2021-04-13 PROCEDURE — 99207 PR NO CHARGE LOS: CPT

## 2021-04-13 PROCEDURE — 71101 X-RAY EXAM UNILAT RIBS/CHEST: CPT | Mod: LT | Performed by: RADIOLOGY

## 2021-04-13 NOTE — PROGRESS NOTES
"    Assessment & Plan     Left-sided chest wall pain  Discussed results of chest x-rays with the patient. Recommend f/u with a CT chest w/o contrast. Unsure what is causing her pain. X-rays were negative for rib fracture.  - XR Ribs & Chest Left G/E 3 Views; Future  - CT Chest w/o Contrast; Future    :[id number]}     FUTURE APPOINTMENTS:       - Follow-up visit in one week or sooner as needed.    Return in about 4 weeks (around 5/11/2021) for Follow up.    Morro Powers MD  [facility]    Connie Ochoa is a 87 year old who presents for the following health issues  accompanied by her son:    HPI   Patient patient is a 87-year-old female  presenting to the clinic today with her son for left-sided chest wall pain which started about a week ago.  She says that it feels like a bruised feeling. She does not recall any trauma or falls.  She has no rash in the area where she is having the pain.  Denies any shortness of breath or chest pain.  She denies any cough.  Pain appears to be getting worse.  Her last mammogram was in 2018 which was normal.  Denies any lumps in her breast.  Denies any redness or warmth in the area where she is having the pain.    Concern - left sided breast pain and rib pain \"feels bruised\" denies injury. Denies shortness of breath.  Onset: one week  Description: bruised feeling  Intensity: moderate  Progression of Symptoms:  Same \"annoying\"  Accompanying Signs & Symptoms: none  Previous history of similar problem: none  Precipitating factors:        Worsened by: coughing  Alleviating factors:        Improved by: nothing  Therapies tried and outcome: None        Review of Systems   Constitutional: Negative.    HENT: Negative.    Eyes: Negative.    Respiratory: Negative.    Cardiovascular: Negative.    Gastrointestinal: Negative.    Endocrine: Negative.    Breasts:  negative.    Genitourinary: Negative.    Musculoskeletal: Positive for arthralgias.   Skin: Negative.  "   Allergic/Immunologic: Negative.    Neurological: Negative.    Hematological: Negative.    Psychiatric/Behavioral: Negative.          Objective    /80 (BP Location: Right arm)   Pulse 85   Temp 96.4  F (35.8  C) (Tympanic)   Resp 18   Wt 69.2 kg (152 lb 9.6 oz)   SpO2 97%   BMI 23.90 kg/m    Body mass index is 23.9 kg/m .  Physical Exam  Constitutional:       Appearance: Normal appearance.   HENT:      Head: Normocephalic and atraumatic.   Neck:      Musculoskeletal: Normal range of motion and neck supple.   Cardiovascular:      Rate and Rhythm: Normal rate and regular rhythm.      Pulses: Normal pulses.      Heart sounds: Normal heart sounds.   Pulmonary:      Effort: Pulmonary effort is normal. No respiratory distress.      Breath sounds: Normal breath sounds. No stridor. No wheezing or rhonchi.   Chest:      Chest wall: No tenderness.      Breasts:         Right: Normal. No swelling.         Left: Tenderness present. No swelling.          Comments: Pain wraps around the left rib cage into the lateral chest wall.   Abdominal:      General: Abdomen is flat. Bowel sounds are normal.      Palpations: Abdomen is soft.   Musculoskeletal:         General: Swelling and tenderness present.        Arms:       Comments: Pain in the lateral chest wall on left side and underneath the breast.    Skin:     General: Skin is warm and dry.   Neurological:      Mental Status: She is alert and oriented to person, place, and time.   Psychiatric:         Mood and Affect: Mood normal.         Behavior: Behavior normal.            Xr Ribs & Chest Left G/e 3 Views    Result Date: 4/13/2021  XR RIBS & CHEST LT 3VW 4/13/2021 12:13 PM HISTORY: Left-sided chest wall pain. COMPARISON: 7/30/2012.     IMPRESSION: A single view the chest show shows no acute cardiopulmonary disease. There is a 1.1 cm nodule at the left lung base which shows no significant change from the prior study, suggesting a benign entity. Two views of the left  ribs show no acute fracture. ZEE ALEXANDER MD

## 2021-04-14 ASSESSMENT — ENCOUNTER SYMPTOMS
GASTROINTESTINAL NEGATIVE: 1
PSYCHIATRIC NEGATIVE: 1
ALLERGIC/IMMUNOLOGIC NEGATIVE: 1
RESPIRATORY NEGATIVE: 1
NEUROLOGICAL NEGATIVE: 1
HEMATOLOGIC/LYMPHATIC NEGATIVE: 1
CARDIOVASCULAR NEGATIVE: 1
ARTHRALGIAS: 1
CONSTITUTIONAL NEGATIVE: 1
ENDOCRINE NEGATIVE: 1
EYES NEGATIVE: 1

## 2021-04-17 ENCOUNTER — HEALTH MAINTENANCE LETTER (OUTPATIENT)
Age: 86
End: 2021-04-17

## 2021-04-19 ENCOUNTER — HOSPITAL ENCOUNTER (OUTPATIENT)
Dept: CT IMAGING | Facility: CLINIC | Age: 86
Discharge: HOME | End: 2021-04-19
Attending: FAMILY MEDICINE | Admitting: FAMILY MEDICINE
Payer: MEDICARE

## 2021-04-19 DIAGNOSIS — R07.89 LEFT-SIDED CHEST WALL PAIN: ICD-10-CM

## 2021-04-19 PROCEDURE — 71250 CT THORAX DX C-: CPT | Mod: MG

## 2021-04-20 ENCOUNTER — MYC MEDICAL ADVICE (OUTPATIENT)
Dept: FAMILY MEDICINE | Facility: CLINIC | Age: 86
End: 2021-04-20

## 2021-04-20 NOTE — RESULT ENCOUNTER NOTE
Please inform patient that test result showed no specific reason for the chest wall pain. Incidentally there were a few pulmonary nodes seen on the CT and it was recommended that she f/u in three months for a recheck with a CT.  Thank you.     Morro Powers M.D.

## 2021-05-04 ENCOUNTER — TELEPHONE (OUTPATIENT)
Dept: INTERNAL MEDICINE | Facility: CLINIC | Age: 86
End: 2021-05-04

## 2021-05-04 NOTE — TELEPHONE ENCOUNTER
[facility] assisted living called stating that patient is moving in tomorrow, and they need a copy of a signed medication list for their pharmacy for depensing medications.     Fax # [phone]      Marce GARCIA  [facility]

## 2021-05-05 NOTE — TELEPHONE ENCOUNTER
[facility] is calling again I will copy and place with [facility] mail.    Shelby Ma CSS on 5/5/2021 at 9:35 AM

## 2021-06-09 ENCOUNTER — TELEPHONE (OUTPATIENT)
Dept: FAMILY MEDICINE | Facility: CLINIC | Age: 86
End: 2021-06-09

## 2021-06-09 NOTE — TELEPHONE ENCOUNTER
Physician Order Sheet-signed, faxed to [phone] and sent to [facility]. Hiral Angelo on 6/9/2021 at 1:46 PM

## 2021-09-26 ENCOUNTER — HEALTH MAINTENANCE LETTER (OUTPATIENT)
Age: 86
End: 2021-09-26

## 2022-01-01 NOTE — RESULT ENCOUNTER NOTE
Cholesterol looks similar to 1 year ago.  Thyroid is normal. Kidney function, nonfasting blood sugar, electrolytes are normal.  Blood count was normal  
The B12 level is very elevated.  I recommend stopping your over-the-counter vitamin B12 supplement as you clearly do not need it
Negative

## 2022-02-22 ENCOUNTER — LAB REQUISITION (OUTPATIENT)
Dept: LAB | Facility: CLINIC | Age: 87
End: 2022-02-22
Payer: COMMERCIAL

## 2022-02-22 DIAGNOSIS — F06.31 MOOD DISORDER DUE TO KNOWN PHYSIOLOGICAL CONDITION WITH DEPRESSIVE FEATURES: ICD-10-CM

## 2022-02-22 DIAGNOSIS — E55.9 VITAMIN D DEFICIENCY, UNSPECIFIED: ICD-10-CM

## 2022-02-22 DIAGNOSIS — I10 ESSENTIAL (PRIMARY) HYPERTENSION: ICD-10-CM

## 2022-02-23 LAB
ALBUMIN SERPL-MCNC: 3.4 G/DL (ref 3.4–5)
ALP SERPL-CCNC: 79 U/L (ref 40–150)
ALT SERPL W P-5'-P-CCNC: 16 U/L (ref 0–50)
ANION GAP SERPL CALCULATED.3IONS-SCNC: 4 MMOL/L (ref 3–14)
AST SERPL W P-5'-P-CCNC: 16 U/L (ref 0–45)
BILIRUB SERPL-MCNC: 0.7 MG/DL (ref 0.2–1.3)
BUN SERPL-MCNC: 9 MG/DL (ref 7–30)
CALCIUM SERPL-MCNC: 9.4 MG/DL (ref 8.5–10.1)
CHLORIDE BLD-SCNC: 107 MMOL/L (ref 94–109)
CO2 SERPL-SCNC: 27 MMOL/L (ref 20–32)
CREAT SERPL-MCNC: 0.83 MG/DL (ref 0.52–1.04)
DEPRECATED CALCIDIOL+CALCIFEROL SERPL-MC: 63 UG/L (ref 20–75)
ERYTHROCYTE [DISTWIDTH] IN BLOOD BY AUTOMATED COUNT: 13.7 % (ref 10–15)
GFR SERPL CREATININE-BSD FRML MDRD: 67 ML/MIN/1.73M2
GLUCOSE BLD-MCNC: 77 MG/DL (ref 70–99)
HCT VFR BLD AUTO: 45.7 % (ref 35–47)
HGB BLD-MCNC: 13.9 G/DL (ref 11.7–15.7)
MCH RBC QN AUTO: 28 PG (ref 26.5–33)
MCHC RBC AUTO-ENTMCNC: 30.4 G/DL (ref 31.5–36.5)
MCV RBC AUTO: 92 FL (ref 78–100)
PLATELET # BLD AUTO: 359 10E3/UL (ref 150–450)
POTASSIUM BLD-SCNC: 4.1 MMOL/L (ref 3.4–5.3)
PROT SERPL-MCNC: 6.7 G/DL (ref 6.8–8.8)
RBC # BLD AUTO: 4.97 10E6/UL (ref 3.8–5.2)
SODIUM SERPL-SCNC: 138 MMOL/L (ref 133–144)
TSH SERPL DL<=0.005 MIU/L-ACNC: 1.81 MU/L (ref 0.4–4)
WBC # BLD AUTO: 7.9 10E3/UL (ref 4–11)

## 2022-02-23 PROCEDURE — 80053 COMPREHEN METABOLIC PANEL: CPT | Mod: ORL | Performed by: PHYSICIAN ASSISTANT

## 2022-02-23 PROCEDURE — 85027 COMPLETE CBC AUTOMATED: CPT | Mod: ORL | Performed by: PHYSICIAN ASSISTANT

## 2022-02-23 PROCEDURE — 36415 COLL VENOUS BLD VENIPUNCTURE: CPT | Mod: ORL | Performed by: PHYSICIAN ASSISTANT

## 2022-02-23 PROCEDURE — 82306 VITAMIN D 25 HYDROXY: CPT | Mod: ORL | Performed by: PHYSICIAN ASSISTANT

## 2022-02-23 PROCEDURE — 84443 ASSAY THYROID STIM HORMONE: CPT | Mod: ORL | Performed by: PHYSICIAN ASSISTANT

## 2022-02-23 PROCEDURE — P9603 ONE-WAY ALLOW PRORATED MILES: HCPCS | Mod: ORL | Performed by: PHYSICIAN ASSISTANT

## 2022-03-02 ENCOUNTER — LAB REQUISITION (OUTPATIENT)
Dept: LAB | Facility: CLINIC | Age: 87
End: 2022-03-02
Payer: COMMERCIAL

## 2022-03-02 DIAGNOSIS — R30.0 DYSURIA: ICD-10-CM

## 2022-03-02 PROCEDURE — 81001 URINALYSIS AUTO W/SCOPE: CPT | Mod: ORL | Performed by: PHYSICIAN ASSISTANT

## 2022-03-02 PROCEDURE — 87186 SC STD MICRODIL/AGAR DIL: CPT | Mod: ORL | Performed by: PHYSICIAN ASSISTANT

## 2022-03-03 LAB
ALBUMIN UR-MCNC: NEGATIVE MG/DL
AMORPH CRY #/AREA URNS HPF: ABNORMAL /HPF
APPEARANCE UR: ABNORMAL
BACTERIA #/AREA URNS HPF: ABNORMAL /HPF
BILIRUB UR QL STRIP: NEGATIVE
COLOR UR AUTO: YELLOW
GLUCOSE UR STRIP-MCNC: NEGATIVE MG/DL
HGB UR QL STRIP: NEGATIVE
KETONES UR STRIP-MCNC: NEGATIVE MG/DL
LEUKOCYTE ESTERASE UR QL STRIP: ABNORMAL
NITRATE UR QL: POSITIVE
PH UR STRIP: 7 [PH] (ref 5–7)
RBC URINE: 1 /HPF
SP GR UR STRIP: 1.01 (ref 1–1.03)
SQUAMOUS EPITHELIAL: 1 /HPF
UROBILINOGEN UR STRIP-MCNC: NORMAL MG/DL
WBC URINE: 26 /HPF

## 2022-03-05 LAB — BACTERIA UR CULT: ABNORMAL

## 2022-05-08 ENCOUNTER — HEALTH MAINTENANCE LETTER (OUTPATIENT)
Age: 87
End: 2022-05-08

## 2022-08-16 ENCOUNTER — LAB REQUISITION (OUTPATIENT)
Dept: LAB | Facility: CLINIC | Age: 87
End: 2022-08-16
Payer: COMMERCIAL

## 2022-08-16 DIAGNOSIS — E55.9 VITAMIN D DEFICIENCY, UNSPECIFIED: ICD-10-CM

## 2022-08-16 DIAGNOSIS — F03.90 UNSPECIFIED DEMENTIA WITHOUT BEHAVIORAL DISTURBANCE: ICD-10-CM

## 2022-08-16 DIAGNOSIS — I10 ESSENTIAL (PRIMARY) HYPERTENSION: ICD-10-CM

## 2022-08-18 ENCOUNTER — LAB REQUISITION (OUTPATIENT)
Dept: LAB | Facility: CLINIC | Age: 87
End: 2022-08-18
Payer: COMMERCIAL

## 2022-08-18 ENCOUNTER — LAB REQUISITION (OUTPATIENT)
Dept: LAB | Facility: CLINIC | Age: 87
End: 2022-08-18
Payer: MEDICARE

## 2022-08-18 DIAGNOSIS — E55.9 VITAMIN D DEFICIENCY, UNSPECIFIED: ICD-10-CM

## 2022-08-18 DIAGNOSIS — F03.90 UNSPECIFIED DEMENTIA WITHOUT BEHAVIORAL DISTURBANCE: ICD-10-CM

## 2022-08-18 DIAGNOSIS — I10 ESSENTIAL (PRIMARY) HYPERTENSION: ICD-10-CM

## 2022-08-19 LAB
ALBUMIN SERPL-MCNC: 3 G/DL (ref 3.4–5)
ALP SERPL-CCNC: 61 U/L (ref 40–150)
ALT SERPL W P-5'-P-CCNC: 13 U/L (ref 0–50)
ANION GAP SERPL CALCULATED.3IONS-SCNC: 4 MMOL/L (ref 3–14)
AST SERPL W P-5'-P-CCNC: 13 U/L (ref 0–45)
BILIRUB SERPL-MCNC: 0.2 MG/DL (ref 0.2–1.3)
BUN SERPL-MCNC: 14 MG/DL (ref 7–30)
CALCIUM SERPL-MCNC: 8.8 MG/DL (ref 8.5–10.1)
CHLORIDE BLD-SCNC: 109 MMOL/L (ref 94–109)
CO2 SERPL-SCNC: 30 MMOL/L (ref 20–32)
CREAT SERPL-MCNC: 0.79 MG/DL (ref 0.52–1.04)
DEPRECATED CALCIDIOL+CALCIFEROL SERPL-MC: 47 UG/L (ref 20–75)
ERYTHROCYTE [DISTWIDTH] IN BLOOD BY AUTOMATED COUNT: 13.9 % (ref 10–15)
GFR SERPL CREATININE-BSD FRML MDRD: 71 ML/MIN/1.73M2
GLUCOSE BLD-MCNC: 81 MG/DL (ref 70–99)
HCT VFR BLD AUTO: 39.6 % (ref 35–47)
HGB BLD-MCNC: 12.4 G/DL (ref 11.7–15.7)
MCH RBC QN AUTO: 27.6 PG (ref 26.5–33)
MCHC RBC AUTO-ENTMCNC: 31.3 G/DL (ref 31.5–36.5)
MCV RBC AUTO: 88 FL (ref 78–100)
PLATELET # BLD AUTO: 319 10E3/UL (ref 150–450)
POTASSIUM BLD-SCNC: 4.7 MMOL/L (ref 3.4–5.3)
PROT SERPL-MCNC: 5.8 G/DL (ref 6.8–8.8)
RBC # BLD AUTO: 4.5 10E6/UL (ref 3.8–5.2)
SODIUM SERPL-SCNC: 143 MMOL/L (ref 133–144)
TSH SERPL DL<=0.005 MIU/L-ACNC: 2.3 MU/L (ref 0.4–4)
WBC # BLD AUTO: 7.1 10E3/UL (ref 4–11)

## 2022-08-19 PROCEDURE — P9603 ONE-WAY ALLOW PRORATED MILES: HCPCS | Mod: ORL | Performed by: PHYSICIAN ASSISTANT

## 2022-08-19 PROCEDURE — 84443 ASSAY THYROID STIM HORMONE: CPT | Mod: ORL | Performed by: PHYSICIAN ASSISTANT

## 2022-08-19 PROCEDURE — 82306 VITAMIN D 25 HYDROXY: CPT | Mod: ORL | Performed by: PHYSICIAN ASSISTANT

## 2022-08-19 PROCEDURE — 36415 COLL VENOUS BLD VENIPUNCTURE: CPT | Mod: ORL | Performed by: PHYSICIAN ASSISTANT

## 2022-08-19 PROCEDURE — 85027 COMPLETE CBC AUTOMATED: CPT | Mod: ORL | Performed by: PHYSICIAN ASSISTANT

## 2022-08-19 PROCEDURE — 80053 COMPREHEN METABOLIC PANEL: CPT | Mod: ORL | Performed by: PHYSICIAN ASSISTANT

## 2022-09-09 ENCOUNTER — LAB REQUISITION (OUTPATIENT)
Dept: LAB | Facility: CLINIC | Age: 87
End: 2022-09-09
Payer: COMMERCIAL

## 2022-09-09 DIAGNOSIS — R30.0 DYSURIA: ICD-10-CM

## 2022-09-11 ENCOUNTER — LAB REQUISITION (OUTPATIENT)
Dept: LAB | Facility: CLINIC | Age: 87
End: 2022-09-11
Payer: COMMERCIAL

## 2022-09-11 DIAGNOSIS — R41.0 DISORIENTATION, UNSPECIFIED: ICD-10-CM

## 2022-09-12 LAB
ALBUMIN SERPL BCG-MCNC: 3.3 G/DL (ref 3.5–5.2)
ALP SERPL-CCNC: 65 U/L (ref 35–104)
ALT SERPL W P-5'-P-CCNC: 9 U/L (ref 10–35)
ANION GAP SERPL CALCULATED.3IONS-SCNC: 8 MMOL/L (ref 7–15)
AST SERPL W P-5'-P-CCNC: 17 U/L (ref 10–35)
BILIRUB SERPL-MCNC: 0.3 MG/DL
BUN SERPL-MCNC: 10.4 MG/DL (ref 8–23)
CALCIUM SERPL-MCNC: 8.7 MG/DL (ref 8.8–10.2)
CHLORIDE SERPL-SCNC: 106 MMOL/L (ref 98–107)
CREAT SERPL-MCNC: 0.83 MG/DL (ref 0.51–0.95)
DEPRECATED HCO3 PLAS-SCNC: 28 MMOL/L (ref 22–29)
ERYTHROCYTE [DISTWIDTH] IN BLOOD BY AUTOMATED COUNT: 14.4 % (ref 10–15)
GFR SERPL CREATININE-BSD FRML MDRD: 67 ML/MIN/1.73M2
GLUCOSE SERPL-MCNC: 88 MG/DL (ref 70–99)
HCT VFR BLD AUTO: 41.6 % (ref 35–47)
HGB BLD-MCNC: 12.7 G/DL (ref 11.7–15.7)
MCH RBC QN AUTO: 27.1 PG (ref 26.5–33)
MCHC RBC AUTO-ENTMCNC: 30.5 G/DL (ref 31.5–36.5)
MCV RBC AUTO: 89 FL (ref 78–100)
PLATELET # BLD AUTO: 246 10E3/UL (ref 150–450)
POTASSIUM SERPL-SCNC: 4.3 MMOL/L (ref 3.4–5.3)
PROT SERPL-MCNC: 5.3 G/DL (ref 6.4–8.3)
RBC # BLD AUTO: 4.68 10E6/UL (ref 3.8–5.2)
SODIUM SERPL-SCNC: 142 MMOL/L (ref 136–145)
TSH SERPL DL<=0.005 MIU/L-ACNC: 2.59 UIU/ML (ref 0.3–4.2)
WBC # BLD AUTO: 6.5 10E3/UL (ref 4–11)

## 2022-09-12 PROCEDURE — P9603 ONE-WAY ALLOW PRORATED MILES: HCPCS | Mod: ORL | Performed by: PHYSICIAN ASSISTANT

## 2022-09-12 PROCEDURE — 85027 COMPLETE CBC AUTOMATED: CPT | Mod: ORL | Performed by: PHYSICIAN ASSISTANT

## 2022-09-12 PROCEDURE — 36415 COLL VENOUS BLD VENIPUNCTURE: CPT | Mod: ORL | Performed by: PHYSICIAN ASSISTANT

## 2022-09-12 PROCEDURE — 84443 ASSAY THYROID STIM HORMONE: CPT | Mod: ORL | Performed by: PHYSICIAN ASSISTANT

## 2022-09-12 PROCEDURE — 80053 COMPREHEN METABOLIC PANEL: CPT | Mod: ORL | Performed by: PHYSICIAN ASSISTANT

## 2022-09-13 ENCOUNTER — LAB REQUISITION (OUTPATIENT)
Dept: LAB | Facility: CLINIC | Age: 87
End: 2022-09-13
Payer: COMMERCIAL

## 2022-09-13 DIAGNOSIS — R30.0 DYSURIA: ICD-10-CM

## 2022-09-13 LAB
ALBUMIN UR-MCNC: NEGATIVE MG/DL
APPEARANCE UR: ABNORMAL
BILIRUB UR QL STRIP: NEGATIVE
COLOR UR AUTO: YELLOW
GLUCOSE UR STRIP-MCNC: NEGATIVE MG/DL
HGB UR QL STRIP: NEGATIVE
KETONES UR STRIP-MCNC: NEGATIVE MG/DL
LEUKOCYTE ESTERASE UR QL STRIP: NEGATIVE
MUCOUS THREADS #/AREA URNS LPF: PRESENT /LPF
NITRATE UR QL: POSITIVE
PH UR STRIP: 7.5 [PH] (ref 5–7)
RBC URINE: <1 /HPF
SP GR UR STRIP: 1 (ref 1–1.03)
SQUAMOUS EPITHELIAL: 1 /HPF
UROBILINOGEN UR STRIP-MCNC: NORMAL MG/DL
WBC URINE: 14 /HPF
YEAST #/AREA URNS HPF: ABNORMAL /HPF

## 2022-09-13 PROCEDURE — 81001 URINALYSIS AUTO W/SCOPE: CPT | Mod: ORL | Performed by: PHYSICIAN ASSISTANT

## 2022-09-13 PROCEDURE — 87086 URINE CULTURE/COLONY COUNT: CPT | Mod: ORL | Performed by: PHYSICIAN ASSISTANT

## 2022-09-15 LAB
BACTERIA UR CULT: ABNORMAL
BACTERIA UR CULT: ABNORMAL

## 2022-10-02 NOTE — PATIENT INSTRUCTIONS
Plan:  - Today's Plan of Care:  Referral to an Orthopedic Surgeon  Inflammatory labs ordered  Continue with Rest, Ice, Compression, and Elevation.  Apply ice 10-15 minutes every 2-3 hours    Follow Up: will call with lab results    If you have any further questions for your physician or physician s care team you can call [phone] and use option 3 to leave a voice message. Calls received during business hours will be returned same day.      
[id number]:Routine|| ||00\01||False;

## 2023-01-31 ENCOUNTER — LAB REQUISITION (OUTPATIENT)
Dept: LAB | Facility: CLINIC | Age: 88
End: 2023-01-31
Payer: COMMERCIAL

## 2023-01-31 DIAGNOSIS — G30.9 ALZHEIMER'S DISEASE, UNSPECIFIED (CODE) (H): ICD-10-CM

## 2023-01-31 DIAGNOSIS — I10 ESSENTIAL (PRIMARY) HYPERTENSION: ICD-10-CM

## 2023-02-01 LAB
ALBUMIN SERPL BCG-MCNC: 4 G/DL (ref 3.5–5.2)
ALP SERPL-CCNC: 84 U/L (ref 35–104)
ALT SERPL W P-5'-P-CCNC: 12 U/L (ref 10–35)
ANION GAP SERPL CALCULATED.3IONS-SCNC: 13 MMOL/L (ref 7–15)
AST SERPL W P-5'-P-CCNC: 28 U/L (ref 10–35)
BILIRUB SERPL-MCNC: 0.4 MG/DL
BUN SERPL-MCNC: 9.9 MG/DL (ref 8–23)
CALCIUM SERPL-MCNC: 10.3 MG/DL (ref 8.8–10.2)
CHLORIDE SERPL-SCNC: 101 MMOL/L (ref 98–107)
CREAT SERPL-MCNC: 0.8 MG/DL (ref 0.51–0.95)
DEPRECATED HCO3 PLAS-SCNC: 25 MMOL/L (ref 22–29)
ERYTHROCYTE [DISTWIDTH] IN BLOOD BY AUTOMATED COUNT: 15.7 % (ref 10–15)
GFR SERPL CREATININE-BSD FRML MDRD: 70 ML/MIN/1.73M2
GLUCOSE SERPL-MCNC: 82 MG/DL (ref 70–99)
HCT VFR BLD AUTO: 47.3 % (ref 35–47)
HGB BLD-MCNC: 14.4 G/DL (ref 11.7–15.7)
MCH RBC QN AUTO: 27.1 PG (ref 26.5–33)
MCHC RBC AUTO-ENTMCNC: 30.4 G/DL (ref 31.5–36.5)
MCV RBC AUTO: 89 FL (ref 78–100)
PLATELET # BLD AUTO: 325 10E3/UL (ref 150–450)
POTASSIUM SERPL-SCNC: 4.8 MMOL/L (ref 3.4–5.3)
PROT SERPL-MCNC: 6.7 G/DL (ref 6.4–8.3)
RBC # BLD AUTO: 5.32 10E6/UL (ref 3.8–5.2)
SODIUM SERPL-SCNC: 139 MMOL/L (ref 136–145)
T4 FREE SERPL-MCNC: 1.19 NG/DL (ref 0.9–1.7)
TSH SERPL DL<=0.005 MIU/L-ACNC: 3.48 UIU/ML (ref 0.3–4.2)
WBC # BLD AUTO: 8 10E3/UL (ref 4–11)

## 2023-02-01 PROCEDURE — 36415 COLL VENOUS BLD VENIPUNCTURE: CPT | Mod: ORL

## 2023-02-01 PROCEDURE — 80053 COMPREHEN METABOLIC PANEL: CPT | Mod: ORL

## 2023-02-01 PROCEDURE — 85027 COMPLETE CBC AUTOMATED: CPT | Mod: ORL

## 2023-02-01 PROCEDURE — P9604 ONE-WAY ALLOW PRORATED TRIP: HCPCS | Mod: ORL

## 2023-02-01 PROCEDURE — 84439 ASSAY OF FREE THYROXINE: CPT | Mod: ORL

## 2023-02-01 PROCEDURE — 84443 ASSAY THYROID STIM HORMONE: CPT | Mod: ORL

## 2023-02-24 NOTE — NURSING NOTE
"Initial /82   Pulse 92   Ht 1.702 m (5' 7\")   BMI 25.45 kg/m   Estimated body mass index is 25.45 kg/m  as calculated from the following:    Height as of this encounter: 1.702 m (5' 7\").    Weight as of 12/19/18: 73.7 kg (162 lb 8 oz). .      " Body Location Override (Optional): Left jawline Which Doctor Performed Mohs? (Optional): Dalton Evans

## 2023-04-23 ENCOUNTER — HEALTH MAINTENANCE LETTER (OUTPATIENT)
Age: 88
End: 2023-04-23

## 2023-05-30 ENCOUNTER — APPOINTMENT (OUTPATIENT)
Dept: GENERAL RADIOLOGY | Facility: CLINIC | Age: 88
End: 2023-05-30
Attending: EMERGENCY MEDICINE
Payer: COMMERCIAL

## 2023-05-30 ENCOUNTER — APPOINTMENT (OUTPATIENT)
Dept: CT IMAGING | Facility: CLINIC | Age: 88
End: 2023-05-30
Attending: EMERGENCY MEDICINE
Payer: COMMERCIAL

## 2023-05-30 ENCOUNTER — HOSPITAL ENCOUNTER (EMERGENCY)
Facility: CLINIC | Age: 88
Discharge: ANOTHER HOSPITAL | End: 2023-05-30
Attending: EMERGENCY MEDICINE | Admitting: EMERGENCY MEDICINE
Payer: COMMERCIAL

## 2023-05-30 VITALS
OXYGEN SATURATION: 94 % | TEMPERATURE: 98.1 F | WEIGHT: 161 LBS | BODY MASS INDEX: 25.22 KG/M2 | DIASTOLIC BLOOD PRESSURE: 64 MMHG | HEART RATE: 76 BPM | SYSTOLIC BLOOD PRESSURE: 129 MMHG | RESPIRATION RATE: 18 BRPM

## 2023-05-30 DIAGNOSIS — S42.402A ELBOW FRACTURE, LEFT, CLOSED, INITIAL ENCOUNTER: ICD-10-CM

## 2023-05-30 DIAGNOSIS — S53.105A DISLOCATION OF LEFT ELBOW, INITIAL ENCOUNTER: ICD-10-CM

## 2023-05-30 LAB
ABO/RH(D): NORMAL
ANION GAP SERPL CALCULATED.3IONS-SCNC: 8 MMOL/L (ref 7–15)
ANTIBODY SCREEN: NEGATIVE
BUN SERPL-MCNC: 12.5 MG/DL (ref 8–23)
CALCIUM SERPL-MCNC: 8.8 MG/DL (ref 8.8–10.2)
CHLORIDE SERPL-SCNC: 106 MMOL/L (ref 98–107)
CREAT SERPL-MCNC: 0.68 MG/DL (ref 0.51–0.95)
DEPRECATED HCO3 PLAS-SCNC: 24 MMOL/L (ref 22–29)
ERYTHROCYTE [DISTWIDTH] IN BLOOD BY AUTOMATED COUNT: 14.2 % (ref 10–15)
GFR SERPL CREATININE-BSD FRML MDRD: 83 ML/MIN/1.73M2
GLUCOSE SERPL-MCNC: 117 MG/DL (ref 70–99)
HCT VFR BLD AUTO: 35.5 % (ref 35–47)
HGB BLD-MCNC: 11.5 G/DL (ref 11.7–15.7)
MCH RBC QN AUTO: 29.2 PG (ref 26.5–33)
MCHC RBC AUTO-ENTMCNC: 32.4 G/DL (ref 31.5–36.5)
MCV RBC AUTO: 90 FL (ref 78–100)
PLATELET # BLD AUTO: 220 10E3/UL (ref 150–450)
POTASSIUM SERPL-SCNC: 4.3 MMOL/L (ref 3.4–5.3)
RBC # BLD AUTO: 3.94 10E6/UL (ref 3.8–5.2)
SODIUM SERPL-SCNC: 138 MMOL/L (ref 136–145)
SPECIMEN EXPIRATION DATE: NORMAL
WBC # BLD AUTO: 12.3 10E3/UL (ref 4–11)

## 2023-05-30 PROCEDURE — 99285 EMERGENCY DEPT VISIT HI MDM: CPT | Performed by: EMERGENCY MEDICINE

## 2023-05-30 PROCEDURE — 36415 COLL VENOUS BLD VENIPUNCTURE: CPT | Performed by: EMERGENCY MEDICINE

## 2023-05-30 PROCEDURE — 73070 X-RAY EXAM OF ELBOW: CPT | Mod: LT

## 2023-05-30 PROCEDURE — 80048 BASIC METABOLIC PNL TOTAL CA: CPT | Performed by: EMERGENCY MEDICINE

## 2023-05-30 PROCEDURE — 96365 THER/PROPH/DIAG IV INF INIT: CPT

## 2023-05-30 PROCEDURE — 96366 THER/PROPH/DIAG IV INF ADDON: CPT

## 2023-05-30 PROCEDURE — 72125 CT NECK SPINE W/O DYE: CPT

## 2023-05-30 PROCEDURE — 96376 TX/PRO/DX INJ SAME DRUG ADON: CPT

## 2023-05-30 PROCEDURE — 73522 X-RAY EXAM HIPS BI 3-4 VIEWS: CPT

## 2023-05-30 PROCEDURE — 73020 X-RAY EXAM OF SHOULDER: CPT | Mod: LT

## 2023-05-30 PROCEDURE — 250N000013 HC RX MED GY IP 250 OP 250 PS 637: Performed by: EMERGENCY MEDICINE

## 2023-05-30 PROCEDURE — 96375 TX/PRO/DX INJ NEW DRUG ADDON: CPT

## 2023-05-30 PROCEDURE — 73100 X-RAY EXAM OF WRIST: CPT | Mod: LT

## 2023-05-30 PROCEDURE — 99285 EMERGENCY DEPT VISIT HI MDM: CPT | Mod: 25

## 2023-05-30 PROCEDURE — 70450 CT HEAD/BRAIN W/O DYE: CPT

## 2023-05-30 PROCEDURE — 86850 RBC ANTIBODY SCREEN: CPT | Performed by: EMERGENCY MEDICINE

## 2023-05-30 PROCEDURE — 85027 COMPLETE CBC AUTOMATED: CPT | Performed by: EMERGENCY MEDICINE

## 2023-05-30 PROCEDURE — 250N000011 HC RX IP 250 OP 636: Performed by: EMERGENCY MEDICINE

## 2023-05-30 RX ORDER — MULTIVIT-MIN/FA/LYCOPEN/LUTEIN .4-300-25
1 TABLET ORAL DAILY
COMMUNITY

## 2023-05-30 RX ORDER — ACETAMINOPHEN 500 MG
1000 TABLET ORAL 2 TIMES DAILY
COMMUNITY

## 2023-05-30 RX ORDER — LOSARTAN POTASSIUM 50 MG/1
50 TABLET ORAL DAILY
COMMUNITY
Start: 2023-05-11

## 2023-05-30 RX ORDER — ONDANSETRON 2 MG/ML
4 INJECTION INTRAMUSCULAR; INTRAVENOUS ONCE
Status: COMPLETED | OUTPATIENT
Start: 2023-05-30 | End: 2023-05-30

## 2023-05-30 RX ORDER — MORPHINE SULFATE 2 MG/ML
2 INJECTION, SOLUTION INTRAMUSCULAR; INTRAVENOUS
Status: DISCONTINUED | OUTPATIENT
Start: 2023-05-30 | End: 2023-05-31 | Stop reason: HOSPADM

## 2023-05-30 RX ORDER — MORPHINE SULFATE 2 MG/ML
2 INJECTION, SOLUTION INTRAMUSCULAR; INTRAVENOUS ONCE
Status: COMPLETED | OUTPATIENT
Start: 2023-05-30 | End: 2023-05-30

## 2023-05-30 RX ORDER — ONDANSETRON 2 MG/ML
4 INJECTION INTRAMUSCULAR; INTRAVENOUS EVERY 6 HOURS
Status: DISCONTINUED | OUTPATIENT
Start: 2023-05-30 | End: 2023-05-31 | Stop reason: HOSPADM

## 2023-05-30 RX ORDER — CITALOPRAM HYDROBROMIDE 20 MG/1
20 TABLET ORAL EVERY EVENING
COMMUNITY
Start: 2023-05-17

## 2023-05-30 RX ORDER — ACETAMINOPHEN 500 MG
500 TABLET ORAL ONCE
Status: COMPLETED | OUTPATIENT
Start: 2023-05-30 | End: 2023-05-30

## 2023-05-30 RX ADMIN — ONDANSETRON 4 MG: 2 INJECTION INTRAMUSCULAR; INTRAVENOUS at 17:23

## 2023-05-30 RX ADMIN — MORPHINE SULFATE 2 MG: 2 INJECTION, SOLUTION INTRAMUSCULAR; INTRAVENOUS at 17:23

## 2023-05-30 RX ADMIN — ACETAMINOPHEN 500 MG: 500 TABLET, FILM COATED ORAL at 17:23

## 2023-05-30 RX ADMIN — DEXTROSE AND SODIUM CHLORIDE: 5; 450 INJECTION, SOLUTION INTRAVENOUS at 17:40

## 2023-05-30 RX ADMIN — MORPHINE SULFATE 2 MG: 2 INJECTION, SOLUTION INTRAMUSCULAR; INTRAVENOUS at 19:29

## 2023-05-30 RX ADMIN — MORPHINE SULFATE 2 MG: 2 INJECTION, SOLUTION INTRAMUSCULAR; INTRAVENOUS at 22:56

## 2023-05-30 ASSESSMENT — ACTIVITIES OF DAILY LIVING (ADL)
ADLS_ACUITY_SCORE: 35

## 2023-05-30 NOTE — ED TRIAGE NOTES
"Patient fell today at [facility]. Patient has dementia, does not remember the fall. Staff at [facility] heard pt fall and scream around 3pm. Deformity to left elbow and \"crunching\" heard by staff. Patient complains of pain in the elbow. CMS intact.    Staff don't believe patient hit head. No loss of consciousness. Not on blood thinners.     Triage Assessment     Row Name 05/30/23 9935       Triage Assessment (Adult)    Airway WDL WDL       Respiratory WDL    Respiratory WDL WDL       Skin Circulation/Temperature WDL    Skin Circulation/Temperature WDL WDL       Cardiac WDL    Cardiac WDL WDL       Peripheral/Neurovascular WDL    Peripheral Neurovascular WDL X       Cognitive/Neuro/Behavioral WDL    Cognitive/Neuro/Behavioral WDL WDL              "

## 2023-05-30 NOTE — ED PROVIDER NOTES
"  History     Chief Complaint   Patient presents with     Fall     HPI  Gabriela Agrawal is a 89 year old female who comes with a fall.    Patient has dementia, does not remember the fall. Staff at [facility] heard pt fall and scream around 3pm. Deformity to left elbow noted by staff and \"crunching\" heard by staff. Patient complains of pain in the elbow. CMS intact.     Staff don't believe patient hit head. No loss of consciousness. Not on blood thinners.    Patient's  Otoniel Agrawal has POA and is at [phone].     I reviewed the medical documents that came from patient's nursing home.       Allergies:  Allergies   Allergen Reactions     Nkda [No Known Drug Allergy]        Problem List:    Patient Active Problem List    Diagnosis Date Noted     Closed displaced fracture of lateral condyle of left humerus, initial encounter 06/01/2023     Priority: Medium     Accidental fall, initial encounter 06/01/2023     Priority: Medium     Closed displaced fracture of lateral epicondyle of left humerus, unspecified fracture morphology, initial encounter 06/01/2023     Priority: Medium     Closed fracture dislocation of left elbow, initial encounter 05/31/2023     Priority: Medium     Senile dementia without behavioral disturbance (H) 06/02/2020     Priority: Medium     Neuropsych testing 6/2020.         Elevated fasting blood sugar 01/07/2019     Priority: Medium     Grief reaction 12/11/2015     Priority: Medium     Serum calcium elevated 10/07/2015     Priority: Medium     Osteopenia of multiple sites 10/06/2015     Priority: Medium     On calcium and vitamin D    Dexa 2015 - 1. The T-score of the lumbar spine in the region of L1-L4 is -0.5.  This correlates with normal bone mineral density. If one looks at the  L2 vertebral body alone the T score is -1.5 which correlates with  mild-moderate osteopenia.     2. The T-score of the right femoral neck is -2.4. This correlates with  severe osteopenia.     3. The T-score of the " left femoral neck is -2.3. This correlates with  severe osteopenia.       Essential hypertension, benign 10/02/2015     Priority: Medium     History of skin cancer 01/29/2013     Priority: Medium     Seborrheic keratosis 01/29/2013     Priority: Medium     Lentigo 01/29/2013     Priority: Medium     Angioma 01/29/2013     Priority: Medium     Dermal nevus 01/29/2013     Priority: Medium     AK (actinic keratosis) 01/29/2013     Priority: Medium     Hyperlipidemia LDL goal <130 10/17/2012     Priority: Medium     Stopped statin 6/9/2020 due to advanced age       Basal cell carcinoma, lip 09/24/2012     Priority: Medium     Squamous cell carcinoma in situ 09/24/2012     Priority: Medium     History of melanoma 09/24/2012     Priority: Medium     Melanoma (H) 02/10/2011     Priority: Medium     Seeing oncologist            Past Medical History:    Past Medical History:   Diagnosis Date     Basal cell carcinoma      Bronchiolitis obliterans (H)      Diffuse cystic mastopathy      Disorder of bone and cartilage, unspecified      Malignant melanoma (H)      Nonspecific (abnormal) findings on radiological and other examination of lung field      Other abnormal blood chemistry      Other specified alveolar and parietoalveolar pneumonopathies      Squamous cell carcinoma      Syncope and collapse        Past Surgical History:    Past Surgical History:   Procedure Laterality Date     APPENDECTOMY OPEN       BREAST SURGERY      biopsy     COLONOSCOPY  2/8/2013    Procedure: COLONOSCOPY;  Colonoscopy  ;  Surgeon: Norah Whaley MD;  Location: [facility]     EYE SURGERY      keratoplasty     HERNIA REPAIR      left inginal     HYSTERECTOMY      including ovaries     KNEE SURGERY      2010, 2009     LUNG SURGERY      biopsy     melanoma  resection         Family History:    Family History   Problem Relation Age of Onset     Cancer Other      Family History Negative Other        Social History:  Marital Status:    [5]  Social History     Tobacco Use     Smoking status: Former     Packs/day: 1.50     Years: 22.50     Pack years: 33.75     Types: Cigarettes     Quit date: 1982     Years since quittin.4     Smokeless tobacco: Never   Substance Use Topics     Alcohol use: Yes     Comment: 4-6 a week     Drug use: No        Medications:    No current outpatient medications on file.        Review of Systems   Unable to perform ROS: Dementia       Physical Exam   BP: (!) 146/78  Pulse: 64  Temp: 98  F (36.7  C)  Resp: 20  Weight: 73 kg (161 lb)  SpO2: 98 %      Physical Exam  Constitutional:       General: She is not in acute distress.     Appearance: She is not toxic-appearing.   HENT:      Right Ear: External ear normal.      Left Ear: External ear normal.      Nose: Nose normal.   Eyes:      General:         Right eye: No discharge.         Left eye: No discharge.   Pulmonary:      Effort: No respiratory distress.   Abdominal:      General: Abdomen is flat. There is no distension.   Musculoskeletal:      Comments: Left elbow with obvious swelling, no ecchymoses  Left had wwp, radial and ulnar pulses intact   Skin:     Capillary Refill: Capillary refill takes less than 2 seconds.      Coloration: Skin is not jaundiced.      Findings: No bruising.   Neurological:      General: No focal deficit present.         ED Course              ED Course as of 23 1547   Tue May 30, 2023   1747 I spoke to our on-call orthopedics.  The patient will need an elbow replacement.  This is probably not amenable to pinning.  There is nobody here capable of doing a elbow replacement so I think the patient will require transfer.    Spoke to patient's , Otoniel Agrawal. He is at [phone]. He has POA and agreed to transfer to any facility that can manage the patient.     Patient continues to have a large amount of pain.  I ordered standing morphine and Zofran for her.    Talked to the [facility].  The on-call trauma  orthopedic surgeon will review the imaging to determine if can be handled at the [facility].   1909 Dr. Pulido from [facility], ok to accept patient when a bed is accepted. Will speak to the hospitalist. Accepted by Dr. Pulido.    1920 Accepted by hospitalist.    1930 Splint to the patient's arm.  This is to provide comfort and protection prior to transport.  I did not try to reduce the fracture.  Her fingers are warm and well-perfused and she is moving all of them.  She has sensation throughout she says.  No numbness she says.  She is complaining of left leg pain though it is not shorter than the right, is not externally rotated, she does not appear to have any pain with rolling either leg.  I am going to get x-rays of her hip, left shoulder, left wrist.  These were not ordered on arrival unfortunately.   1959 Spoke to Dr. Vazquez again from the [facility]. Because Dr. Pulido is on call at the [facility], request transfer to the [facility] if no bed available at [facility] in 1 hour.   2115 I spoke to Dr. Javed at the [facility] ER. He accepted the patient for transfer from ER to ER.      Procedures                No results found for this or any previous visit (from the past 24 hour(s)).    Medications   acetaminophen (TYLENOL) tablet 500 mg (500 mg Oral $Given 5/30/23 1723)   morphine (PF) injection 2 mg (2 mg Intravenous $Given 5/30/23 1723)   ondansetron (ZOFRAN) injection 4 mg (4 mg Intravenous $Given 5/30/23 1723)       Assessments & Plan (with Medical Decision Making)       X-ray was ordered prior to my evaluation of the patient who arrived an hour ago. This shows:    IMPRESSION: Acute fracture, dislocation of the left elbow with dorsal and radial-sided dislocation of the proximal radius and ulna and a comminuted fracture of the lateral epicondyle and capitellum. In addition, there is approximately 90 degrees   resulting rotation of the elbow.    I independently reviewed and independently  interpreted this and I agree.  This is a complex fracture and is going to require surgery.  I will speak with orthopedics for guidance.    I will get a CT of her head and her neck as well given her dementia.  We will get labs and give her pain control.    I have reviewed the nursing notes.    I have reviewed the findings, diagnosis, plan and need for follow up with the patient.        Medical Decision Making  The patient's presentation was of high complexity (an acute health issue posing potential threat to life or bodily function).    The patient's evaluation involved:  review of external note(s) from 1 sources (see separate area of note for details)  ordering and/or review of 3+ test(s) in this encounter (see separate area of note for details)  independent interpretation of testing performed by another health professional (see separate area of note for details)  discussion of management or test interpretation with another health professional (see separate area of note for details)    The patient's management necessitated high risk (a parenteral controlled substance) and high risk (a decision regarding hospitalization).    This patient was seen in the setting of ER overcrowding, ER overuse due to inadequate primary care availability, chronic boarding, a national mental health crisis that has brought a record number of patient with mental health crises and anxiety to the ER, and understaffing of emergency medicine physicians, all of which increase risk to patients and decrease patient satisfaction. Every effort was made to provide optimum care to this patient despite the extreme, ongoing burden on the ER staff.     This note was in part created using dictation software in an effort to speed and improve patient care; any typos should be read with the frequent shortcomings of this technology in mind.        Discharge Medication List as of 5/30/2023 11:01 PM          Final diagnoses:   Elbow fracture, left, closed,  initial encounter   Dislocation of left elbow, initial encounter       5/30/2023   [facility] EMERGENCY DEPT     Wing Ny MD  06/02/23 9685

## 2023-05-31 ENCOUNTER — APPOINTMENT (OUTPATIENT)
Dept: GENERAL RADIOLOGY | Facility: CLINIC | Age: 88
End: 2023-05-31
Attending: STUDENT IN AN ORGANIZED HEALTH CARE EDUCATION/TRAINING PROGRAM
Payer: COMMERCIAL

## 2023-05-31 ENCOUNTER — APPOINTMENT (OUTPATIENT)
Dept: CT IMAGING | Facility: CLINIC | Age: 88
End: 2023-05-31
Attending: STUDENT IN AN ORGANIZED HEALTH CARE EDUCATION/TRAINING PROGRAM
Payer: COMMERCIAL

## 2023-05-31 ENCOUNTER — HOSPITAL ENCOUNTER (OUTPATIENT)
Facility: CLINIC | Age: 88
Setting detail: OBSERVATION
Discharge: NURSING, REHAB OR LONG-TERM CARE | End: 2023-06-02
Attending: EMERGENCY MEDICINE | Admitting: INTERNAL MEDICINE
Payer: COMMERCIAL

## 2023-05-31 DIAGNOSIS — F03.90 SENILE DEMENTIA WITHOUT BEHAVIORAL DISTURBANCE (H): Primary | ICD-10-CM

## 2023-05-31 DIAGNOSIS — S42.432A CLOSED DISPLACED FRACTURE OF LATERAL EPICONDYLE OF LEFT HUMERUS, UNSPECIFIED FRACTURE MORPHOLOGY, INITIAL ENCOUNTER: ICD-10-CM
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
## 2023-05-31 DIAGNOSIS — S42.402A CLOSED FRACTURE DISLOCATION OF LEFT ELBOW, INITIAL ENCOUNTER: ICD-10-CM

## 2023-05-31 DIAGNOSIS — S42.452A CLOSED DISPLACED FRACTURE OF LATERAL CONDYLE OF LEFT HUMERUS, INITIAL ENCOUNTER: ICD-10-CM

## 2023-05-31 LAB
ABO/RH(D): NORMAL
ANTIBODY SCREEN: NEGATIVE
GLUCOSE BLDC GLUCOMTR-MCNC: 112 MG/DL (ref 70–99)
SARS-COV-2 RNA RESP QL NAA+PROBE: NEGATIVE
SPECIMEN EXPIRATION DATE: NORMAL

## 2023-05-31 PROCEDURE — 999N000065 XR ELBOW LEFT 2 VIEWS: Mod: LT

## 2023-05-31 PROCEDURE — 87635 SARS-COV-2 COVID-19 AMP PRB: CPT | Performed by: STUDENT IN AN ORGANIZED HEALTH CARE EDUCATION/TRAINING PROGRAM

## 2023-05-31 PROCEDURE — 250N000011 HC RX IP 250 OP 636: Performed by: STUDENT IN AN ORGANIZED HEALTH CARE EDUCATION/TRAINING PROGRAM

## 2023-05-31 PROCEDURE — 86850 RBC ANTIBODY SCREEN: CPT

## 2023-05-31 PROCEDURE — 99285 EMERGENCY DEPT VISIT HI MDM: CPT | Mod: 25 | Performed by: EMERGENCY MEDICINE

## 2023-05-31 PROCEDURE — 99156 MOD SED OTH PHYS/QHP 5/>YRS: CPT | Performed by: EMERGENCY MEDICINE

## 2023-05-31 PROCEDURE — 120N000002 HC R&B MED SURG/OB UMMC

## 2023-05-31 PROCEDURE — 73200 CT UPPER EXTREMITY W/O DYE: CPT | Mod: LT

## 2023-05-31 PROCEDURE — 99222 1ST HOSP IP/OBS MODERATE 55: CPT | Mod: AI | Performed by: STUDENT IN AN ORGANIZED HEALTH CARE EDUCATION/TRAINING PROGRAM

## 2023-05-31 PROCEDURE — 250N000011 HC RX IP 250 OP 636

## 2023-05-31 PROCEDURE — 24565 CLTX HUM EPCNDYLR FX W/MNPJ: CPT | Mod: LT

## 2023-05-31 PROCEDURE — 36415 COLL VENOUS BLD VENIPUNCTURE: CPT

## 2023-05-31 PROCEDURE — 250N000009 HC RX 250: Performed by: EMERGENCY MEDICINE

## 2023-05-31 PROCEDURE — 250N000013 HC RX MED GY IP 250 OP 250 PS 637: Performed by: STUDENT IN AN ORGANIZED HEALTH CARE EDUCATION/TRAINING PROGRAM

## 2023-05-31 PROCEDURE — 73200 CT UPPER EXTREMITY W/O DYE: CPT | Mod: 26 | Performed by: RADIOLOGY

## 2023-05-31 RX ORDER — HYDROMORPHONE HYDROCHLORIDE 1 MG/ML
0.3 INJECTION, SOLUTION INTRAMUSCULAR; INTRAVENOUS; SUBCUTANEOUS
Status: DISCONTINUED | OUTPATIENT
Start: 2023-05-31 | End: 2023-05-31

## 2023-05-31 RX ORDER — DONEPEZIL HYDROCHLORIDE 10 MG/1
10 TABLET, FILM COATED ORAL EVERY MORNING
Status: DISCONTINUED | OUTPATIENT
Start: 2023-05-31 | End: 2023-06-02 | Stop reason: HOSPADM

## 2023-05-31 RX ORDER — HYDROMORPHONE HCL IN WATER/PF 6 MG/30 ML
0.2 PATIENT CONTROLLED ANALGESIA SYRINGE INTRAVENOUS EVERY 6 HOURS PRN
Status: DISCONTINUED | OUTPATIENT
Start: 2023-05-31 | End: 2023-06-02 | Stop reason: HOSPADM

## 2023-05-31 RX ORDER — NALOXONE HYDROCHLORIDE 0.4 MG/ML
0.4 INJECTION, SOLUTION INTRAMUSCULAR; INTRAVENOUS; SUBCUTANEOUS
Status: DISCONTINUED | OUTPATIENT
Start: 2023-05-31 | End: 2023-06-02 | Stop reason: HOSPADM

## 2023-05-31 RX ORDER — AMOXICILLIN 250 MG
2 CAPSULE ORAL 2 TIMES DAILY PRN
Status: DISCONTINUED | OUTPATIENT
Start: 2023-05-31 | End: 2023-06-02 | Stop reason: HOSPADM

## 2023-05-31 RX ORDER — ACETAMINOPHEN 500 MG
1000 TABLET ORAL 2 TIMES DAILY
Status: DISCONTINUED | OUTPATIENT
Start: 2023-05-31 | End: 2023-06-02 | Stop reason: HOSPADM

## 2023-05-31 RX ORDER — HYDROMORPHONE HYDROCHLORIDE 1 MG/ML
INJECTION, SOLUTION INTRAMUSCULAR; INTRAVENOUS; SUBCUTANEOUS
Status: COMPLETED
Start: 2023-05-31 | End: 2023-05-31

## 2023-05-31 RX ORDER — PROPOFOL 10 MG/ML
INJECTION, EMULSION INTRAVENOUS
Status: COMPLETED
Start: 2023-05-31 | End: 2023-05-31

## 2023-05-31 RX ORDER — CITALOPRAM HYDROBROMIDE 20 MG/1
20 TABLET ORAL EVERY EVENING
Status: DISCONTINUED | OUTPATIENT
Start: 2023-05-31 | End: 2023-06-02 | Stop reason: HOSPADM

## 2023-05-31 RX ORDER — NALOXONE HYDROCHLORIDE 0.4 MG/ML
0.2 INJECTION, SOLUTION INTRAMUSCULAR; INTRAVENOUS; SUBCUTANEOUS
Status: DISCONTINUED | OUTPATIENT
Start: 2023-05-31 | End: 2023-06-02 | Stop reason: HOSPADM

## 2023-05-31 RX ORDER — AMOXICILLIN 250 MG
1 CAPSULE ORAL 2 TIMES DAILY PRN
Status: DISCONTINUED | OUTPATIENT
Start: 2023-05-31 | End: 2023-06-02 | Stop reason: HOSPADM

## 2023-05-31 RX ORDER — LIDOCAINE 40 MG/G
CREAM TOPICAL
Status: DISCONTINUED | OUTPATIENT
Start: 2023-05-31 | End: 2023-06-02 | Stop reason: HOSPADM

## 2023-05-31 RX ORDER — ONDANSETRON 2 MG/ML
4 INJECTION INTRAMUSCULAR; INTRAVENOUS EVERY 6 HOURS PRN
Status: DISCONTINUED | OUTPATIENT
Start: 2023-05-31 | End: 2023-06-02 | Stop reason: HOSPADM

## 2023-05-31 RX ORDER — LANOLIN ALCOHOL/MO/W.PET/CERES
2 CREAM (GRAM) TOPICAL DAILY
COMMUNITY

## 2023-05-31 RX ORDER — ETOMIDATE 2 MG/ML
20 INJECTION INTRAVENOUS ONCE
Status: COMPLETED | OUTPATIENT
Start: 2023-05-31 | End: 2023-05-31

## 2023-05-31 RX ORDER — ONDANSETRON 4 MG/1
4 TABLET, ORALLY DISINTEGRATING ORAL EVERY 6 HOURS PRN
Status: DISCONTINUED | OUTPATIENT
Start: 2023-05-31 | End: 2023-06-02 | Stop reason: HOSPADM

## 2023-05-31 RX ORDER — BISACODYL 10 MG
10 SUPPOSITORY, RECTAL RECTAL DAILY PRN
Status: DISCONTINUED | OUTPATIENT
Start: 2023-05-31 | End: 2023-06-02 | Stop reason: HOSPADM

## 2023-05-31 RX ADMIN — PROPOFOL 20 MG: 10 INJECTION, EMULSION INTRAVENOUS at 01:33

## 2023-05-31 RX ADMIN — Medication 1 MG: at 23:40

## 2023-05-31 RX ADMIN — HYDROMORPHONE HYDROCHLORIDE 0.2 MG: 0.2 INJECTION, SOLUTION INTRAMUSCULAR; INTRAVENOUS; SUBCUTANEOUS at 23:36

## 2023-05-31 RX ADMIN — HYDROMORPHONE HYDROCHLORIDE 0.3 MG: 1 INJECTION, SOLUTION INTRAMUSCULAR; INTRAVENOUS; SUBCUTANEOUS at 02:49

## 2023-05-31 RX ADMIN — DONEPEZIL HYDROCHLORIDE 10 MG: 10 TABLET ORAL at 09:32

## 2023-05-31 RX ADMIN — ETOMIDATE 10 MG: 40 INJECTION, SOLUTION INTRAVENOUS at 01:23

## 2023-05-31 RX ADMIN — CITALOPRAM HYDROBROMIDE 20 MG: 20 TABLET ORAL at 20:35

## 2023-05-31 RX ADMIN — ETOMIDATE 10 MG: 40 INJECTION, SOLUTION INTRAVENOUS at 01:16

## 2023-05-31 RX ADMIN — ACETAMINOPHEN 1000 MG: 500 TABLET ORAL at 09:32

## 2023-05-31 RX ADMIN — ETOMIDATE 10 MG: 40 INJECTION, SOLUTION INTRAVENOUS at 01:12

## 2023-05-31 RX ADMIN — ACETAMINOPHEN 1000 MG: 500 TABLET ORAL at 20:35

## 2023-05-31 ASSESSMENT — ACTIVITIES OF DAILY LIVING (ADL)
ADLS_ACUITY_SCORE: 47
ADLS_ACUITY_SCORE: 35
ADLS_ACUITY_SCORE: 47
ADLS_ACUITY_SCORE: 37
ADLS_ACUITY_SCORE: 47
ADLS_ACUITY_SCORE: 37
ADLS_ACUITY_SCORE: 47

## 2023-05-31 NOTE — CONSULTS
[facility] Orthopedic Surgery Consultation    Gabriela Agrawal MRN# [id number]   Age: 89 year old YOB: 1933   Date of Admission: (Not on file)    Reason for consult: Left elbow fracture dislocation   Requesting physician: No att. providers found   Level of consult: Consult, follow and place orders          Assessment and Plan:   Assessment:  Gabriela Agrawal is a 89 year old female with PMH significant for dementia and HTN who orthopedic surgery was consulted on regarding a left elbow fracture dislocation. Skin intact on exam. NVI on exam. On the injury film there appeared to be shear fractures to the trochlear and capitellum. There appeared to be incarcerated fragments within the trochlear groove. The patient underwent an attempted sedated closed reduction and splinting of the left upper extremity. This was unsuccessful in obtaining concentric reduction.  The elbow was significantly unstable during reduction attempts.  The elbow was splinted in an area more near anatomic and attempt to take any tension off of soft tissue structures in the upper extremity.  After the reduction the patient was able to fire EPL, FPL and interossei.  Her fingers remained warm and well-perfused.  As the elbow is still dislocated we will plan for admission to the hospital and discussion with staff on possible surgical intervention.      Plan:  - Plan for OR: TBD, pending discussion with staff   - Anticoagulation/DVT prophylaxis: Hold in the setting of possible OR  - Antibiotics: Perioperative ancef ordered   - Imaging: Post splint CT of the elbow  - Activity: Sling for comfort  - Splint: Keep long arm splint clear and dry  - Weight bearing: NWB on the LUE.  - Pain control: IV and oral options. Avoid opioid medications as possible  - Diet: NPO pending staff discussion  - Follow-up: TBD   - Disposition: TBD    Discussed with Dr. Jp ST PGY4. Orthopedic surgery staff is Dr. Vazquez    --  Vadim Corral MD  Orthopedic Surgery  PGY-2           History of Present Illness:   History limited due to history of dementia. History obtained through chart review.     Gabriela Agrawal is a 89 year old female with PMH significant for significant dementia and HTN who orthopedic surgery was consulted on regarding a left elbow injury. The patient resides at [facility] and staff there heard the patient fall and a scream and found her laying on the floor around 1500. They noted a deformity to the left elbow. They did not believe that the patient struck her head or loss consciousness.     A 10 point review of systems was otherwise negative other than as noted in history above.         Past Medical History:     Past Medical History:   Diagnosis Date     Basal cell carcinoma      Bronchiolitis obliterans (H)      Diffuse cystic mastopathy     Bresat Fibrocystic Disease     Disorder of bone and cartilage, unspecified      Malignant melanoma (H)      Nonspecific (abnormal) findings on radiological and other examination of lung field      Other abnormal blood chemistry      Other specified alveolar and parietoalveolar pneumonopathies      Squamous cell carcinoma      Syncope and collapse     2007 holter neg       Patient denies any personal history of bleeding disorders, clotting disorders, or adverse reactions to anesthesia.         Past Surgical History:     Past Surgical History:   Procedure Laterality Date     APPENDECTOMY OPEN       BREAST SURGERY      biopsy     COLONOSCOPY  2/8/2013    Procedure: COLONOSCOPY;  Colonoscopy  ;  Surgeon: Norah Whaley MD;  Location: [facility]     EYE SURGERY      keratoplasty     HERNIA REPAIR      left inginal     HYSTERECTOMY      including ovaries     KNEE SURGERY      2010, 2009     LUNG SURGERY      biopsy     melanoma  resection              Social History:     Social History     Socioeconomic History     Marital status:      Spouse name: Not on file     Number of children: Not on file      Years of education: Not on file     Highest education level: Not on file   Occupational History     Occupation:      Employer: RETIRED   Tobacco Use     Smoking status: Former     Packs/day: 1.50     Years: 22.50     Pack years: 33.75     Types: Cigarettes     Quit date: 1982     Years since quittin.4     Smokeless tobacco: Never   Vaping Use     Vaping status: Not on file   Substance and Sexual Activity     Alcohol use: Yes     Comment: 4-6 a week     Drug use: No     Sexual activity: Not on file   Other Topics Concern     Parent/sibling w/ CABG, MI or angioplasty before 65F 55M? No   Social History Narrative     Not on file     Social Determinants of Health     Financial Resource Strain: Not on file   Food Insecurity: Not on file   Transportation Needs: Not on file   Physical Activity: Not on file   Stress: Not on file   Social Connections: Not on file   Intimate Partner Violence: Not on file   Housing Stability: Not on file     Living Situation: Lives in assisted living facility   Occupation: Retired   Tobacco: Former smoker - quit in   Alcohol: None          Family History:     Family History   Problem Relation Age of Onset     Cancer Other      Family History Negative Other        Patient denies known family history of bleeding, clotting, or anesthesia-related complications.            Allergies:     Allergies   Allergen Reactions     Nkda [No Known Drug Allergy]              Medications:     Prior to Admission medications    Medication Sig Last Dose Taking? Auth Provider Long Term End Date   acetaminophen (TYLENOL) 500 MG tablet Take 1,000 mg by mouth 2 times daily   Reported, Patient     Calcium Carbonate-Vitamin D (CALCIUM + D PO) Take 2 tablets by mouth daily Takes (1) 315-200 mg tablet once daily   Reported, Patient     cholecalciferol (VITAMIN D) 1000 UNIT tablet Take 1 tablet by mouth daily    Americo Mercado MD     citalopram (CELEXA) 20 MG tablet Take 20 mg by mouth every  evening   Reported, Patient Yes    donepezil (ARICEPT) 10 MG tablet Take 1 tablet (10 mg) by mouth At Bedtime  Patient taking differently: Take 10 mg by mouth every morning   Coco Mejia DO     losartan (COZAAR) 50 MG tablet Take 50 mg by mouth daily   Reported, Patient Yes    Multiple Vitamins-Minerals (CEROVITE SENIOR) TABS Take 1 tablet by mouth daily   Reported, Patient     vitamin E (TOCOPHEROL) 1000 units (900 mg) capsule Take 2 capsules (2,000 Units) by mouth daily   Coco Mejia DO       Anticoagulation noted: None           Physical Exam:   There were no vitals filed for this visit.    General: alert and oriented, answers questions appropriately,   Neuro: EOM grossly intact  HEENT: atraumatic  Lungs: breathing comfortably on RA  Heart/Cardiovascular: well perfused    Back: Nontender to palpation throughout, no step-offs or deformities appreciated. Bilateral SI joints non-tender.   Pelvis: Stable to AP and lateral compression, non-tender.    Right Upper Extremity:   - No gross deformity, skin intact.  - No significant tenderness to palpation over sternoclavicular joint, clavicle, acromioclavicular joint, shoulder, arm, elbow, forearm, wrist, hand, fingers.  - No pain with ROM shoulder, elbow, wrist.  - Motor intact distally with deltoid, FPL, EPL, and IO.  - SILT median, ulnar, radial, axillary nerve distributions.  - Radial pulse palpable, fingers warm and well perfused.    Left Upper Extremity:   - Deformity about the left elbow, palpable crepitous over the elbow  - ecchymosis over the medial aspect of the elbow  - Compartments of the arm and forearm and soft and compressible.   - no skin wounds. Superficial skin tears over the dorsum of the forearm occurred during reduction attempts.   - No significant tenderness to palpation over sternoclavicular joint, clavicle, acromioclavicular joint, shoulder, wrist, hand, fingers.  - No pain with ROM wrist.  - Motor intact distally with deltoid,  FPL, EPL, and IO  - SILT median, ulnar, radial, axillary nerve distributions.  - Radial pulse palpable, fingers warm and well perfused.    Right Lower Extremity:   - No gross deformity, skin intact.  - No significant tenderness to palpation over thigh, knee, leg, ankle, foot, toes.  - No pain with ROM hip, knee, ankle.   - Motor intact distally TA, GSC, EHL, FHL  - SILT superficial peroneal, deep peroneal, saphenous, sural, and tibial nerve distributions.   - DP/PT pulses palpable, toes warm and well perfused.      Left Lower Extremity:   - No gross deformity, skin intact.  - No significant tenderness to palpation over thigh, knee, leg, ankle, foot, toes.  - No pain with ROM hip, knee, ankle.   - Motor intact distally TA, GSC, EHL, FHL  - SILT superficial peroneal, deep peroneal, saphenous, sural, and tibial nerve distributions.   - DP/PT pulses palpable, toes warm and well perfused.            Imaging:   All imaging independently reviewed.     XR of the left elbow with a ulnohumeral dislocation. There appears to be shear fractures of the capitellum and the trochlea. There also appears to be a fragment without the trochlear fossa. Significant soft tissue swelling about the elbow     XR of the shoulder without fracture. AP pelvis and bilateral lateral hips without fracture or dislocatio.          Labs:   CBC:  Lab Results   Component Value Date    WBC 12.3 (H) 05/30/2023    HGB 11.5 (L) 05/30/2023     05/30/2023       BMP:  Lab Results   Component Value Date     05/30/2023    POTASSIUM 4.3 05/30/2023    CHLORIDE 106 05/30/2023    CO2 24 05/30/2023    BUN 12.5 05/30/2023    CR 0.68 05/30/2023    ANIONGAP 8 05/30/2023    TESFAYE 8.8 05/30/2023     (H) 05/30/2023       Inflammatory Markers:  Lab Results   Component Value Date    WBC 12.3 (H) 05/30/2023    CRP 66.5 (H) 09/05/2018    SED 71 (H) 09/05/2018          Closed reduction and splinting of the left elbow fracture/dislocation:   Consent obtained  from the patient's son. A etomidate sedation was provided by the emergency department. After adequate analgesia and sedation was provided in line traction was applied to the left upper extremity with varying amount of pronation and supination as well as manipulation with olecranon. Significant crepitous was felt. There was significant instability in the elbow both medially and longitudinally. Several skin tears occurred during reduction attempts. A well padded posterior long arm splint was applied to the left upper extremity with medial and lateral struts to provide additional support. Patient tolerated the procedure well without complications. Patient was neurovascularly intact before and after the procedure

## 2023-05-31 NOTE — MEDICATION SCRIBE - ADMISSION MEDICATION HISTORY
Medication Scribe Admission Medication History    Admission medication history is complete. The information provided in this note is only as accurate as the sources available at the time of the update.    Medication reconciliation/reorder completed by provider prior to medication history? No    Information Source(s): Patient via in-person    Pertinent Information: none    Changes made to PTA medication list:    Added: TYLENOL, CELEXA    Deleted: None    Changed: None    Medication Affordability:  Not including over the counter (OTC) medications, was there a time in the past 3 months when you did not take your medications as prescribed because of cost?: No    Allergies reviewed with patient and updates made in EHR: yes    Medication History Completed By: Caryn Williamson 5/30/2023 8:02 PM    Prior to Admission medications    Medication Sig Last Dose Taking? Auth Provider Long Term End Date   acetaminophen (TYLENOL) 500 MG tablet Take 1,000 mg by mouth 2 times daily 5/30/2023 at 0940 Yes Reported, Patient     Calcium Carbonate-Vitamin D (CALCIUM + D PO) Take 1 tablet by mouth daily Takes (1) 315-200 mg tablet once daily 5/30/2023 at 0940 Yes Reported, Patient     cholecalciferol (VITAMIN D) 1000 UNIT tablet Take 1 tablet by mouth daily  5/30/2023 at 0940 Yes Americo Mercado MD     citalopram (CELEXA) 20 MG tablet Take 20 mg by mouth daily 5/29/2023 at 2258 Yes Reported, Patient Yes    donepezil (ARICEPT) 10 MG tablet Take 1 tablet (10 mg) by mouth At Bedtime  Patient taking differently: Take 10 mg by mouth daily 5/30/2023 at 0940 Yes Coco Mejia DO     losartan (COZAAR) 50 MG tablet Take 50 mg by mouth daily 5/30/2023 at 0940 Yes Reported, Patient Yes    MULTI-VITAMIN OR TABS 1 TABLET DAILY 5/30/2023 at 0940 Yes Reported,      vitamin E (TOCOPHEROL) 1000 units (900 mg) capsule Take 2 capsules (2,000 Units) by mouth daily 5/30/2023 at 0940 Yes Coco Mejia DO

## 2023-05-31 NOTE — H&P
[facility]    History and Physical - Hospitalist Service, GOLD TEAM        Date of Admission:  5/31/2023    Assessment & Plan      Gabriela Agrawal is a 89 year old female admitted on 5/31/2023. She has history of dementia and HTN and presents after an unwitnessed fall with resultant left elbow fracture.     Acute fracture and dislocation of the left elbow  Unwitnessed fall  Patient does not recall events of fall. Staff at her facility heard her cry out and found her on the ground. Extensive imaging at the outside ED found only pathology to be an acute fracture and dislocation of the elbow. Head imaging negative for acute pathology. Attempted sedated closed reduction of elbow but unsuccessful in the ED. Currently splinted and awaiting Orthopedic surgery evaluation for possible surgical intervention.   - Orthopedics consulted, still deciding about surgery  - NPO in case of procedure  - Hold any ppx anticoagulation   - Will need PT/OT consults pending definitive management  - Oxycodone, IV dilaudid PRN for pain  - Scheduled tylenol   - NWB LUE     Dementia  - Continue PTA donepezil, citalopram  - At risk for delirium, delirium precautions   - Patient's son Kendall Agrawal is POA and point of contact    HTN  - Hold PTA losartan      Diet: NPO per Anesthesia Guidelines for Procedure/Surgery Except for: Meds    DVT Prophylaxis: Pneumatic Compression Devices  Rico Catheter: Not present  Lines: None     Cardiac Monitoring: None  Code Status: Full Code      Clinically Significant Risk Factors Present on Admission                  # Hypertension: Noted on problem list   # Dementia: noted on problem list             Disposition Plan      Expected Discharge Date: 06/02/2023                  Roxana Gomez MD  Hospitalist Service, GOLD TEAM   [facility]  Securely message with [employer] (more info)  Text page via [employer] Paging/Directory   See  signed in provider for up to date coverage information    ______________________________________________________________________    Chief Complaint   Fall    History is obtained from the patient and chart review      History of Present Illness   Gabriela Agrawal is a 89 year old female admitted on 5/31/2023. She has history of dementia and HTN and presents after an unwitnessed fall with resultant left elbow fracture. Per chart review, patient resides at a facility and staff heard her cry out and found her on the floor. Gabriela does not recall the events of the fall. She was seen at [facility] ED and had imaging which was significant for left elbow fracture and dislocation. She was transferred to [facility] for orthopedic consult and management.     Currently Gabriela is pleasantly confused but reports feeling sore in her left arm, otherwise no other pain. Denies any chest pain or shortness of breath. No headache.        Past Medical History    Past Medical History:   Diagnosis Date     Basal cell carcinoma      Bronchiolitis obliterans (H)      Diffuse cystic mastopathy     Bresat Fibrocystic Disease     Disorder of bone and cartilage, unspecified      Malignant melanoma (H)      Nonspecific (abnormal) findings on radiological and other examination of lung field      Other abnormal blood chemistry      Other specified alveolar and parietoalveolar pneumonopathies      Squamous cell carcinoma      Syncope and collapse     2007 holter neg       Past Surgical History   Past Surgical History:   Procedure Laterality Date     APPENDECTOMY OPEN       BREAST SURGERY      biopsy     COLONOSCOPY  2/8/2013    Procedure: COLONOSCOPY;  Colonoscopy  ;  Surgeon: Norah Whaley MD;  Location: [facility]     EYE SURGERY      keratoplasty     HERNIA REPAIR      left inginal     HYSTERECTOMY      including ovaries     KNEE SURGERY      2010, 2009     LUNG SURGERY      biopsy     melanoma  resection         Prior to Admission  Medications   Prior to Admission Medications   Prescriptions Last Dose Informant Patient Reported? Taking?   Calcium Carbonate-Vitamin D (CALCIUM + D PO)  Nursing Home Yes No   Sig: Take 2 tablets by mouth daily Takes (1) 315-200 mg tablet once daily   Multiple Vitamins-Minerals (CEROVITE SENIOR) TABS  Nursing Home Yes No   Sig: Take 1 tablet by mouth daily   acetaminophen (TYLENOL) 500 MG tablet  Nursing Home Yes No   Sig: Take 1,000 mg by mouth 2 times daily   cholecalciferol (VITAMIN D) 1000 UNIT tablet  Nursing Home Yes No   Sig: Take 1 tablet by mouth daily    citalopram (CELEXA) 20 MG tablet  Nursing Home Yes No   Sig: Take 20 mg by mouth every evening   donepezil (ARICEPT) 10 MG tablet  Nursing Home No No   Sig: Take 1 tablet (10 mg) by mouth At Bedtime   Patient taking differently: Take 10 mg by mouth every morning   losartan (COZAAR) 50 MG tablet  Nursing Home Yes No   Sig: Take 50 mg by mouth daily   vitamin E (TOCOPHEROL) 1000 units (900 mg) capsule  Nursing Home No No   Sig: Take 2 capsules (2,000 Units) by mouth daily      Facility-Administered Medications: None        Physical Exam   Vital Signs: Temp: 98.1  F (36.7  C) Temp src: Oral BP: (!) 140/62 Pulse: 66   Resp: 16 SpO2: 93 % O2 Device: None (Room air) Oxygen Delivery: 3 LPM  Weight: 0 lbs 0 oz    General: Awake, alert, no distress. Pleasant.  Eyes: Sclera anicteric. No conjunctival injection. PERRLA.   CV: Normal rate and rhythm, normal S1 and S2. No murmurs, rubs, gallops.  Respiratory: Lungs clear to auscultation bilaterally. No wheezing, rhonchi, or rales.  Abdomen: Soft, non-distended. Non-tender to palpation. No rebound tenderness or guarding. +BS.   MSK: Left arm splinted   Extremities: No lower extremity edema.   Skin: Warm, dry.   Neuro: Alert, oriented to self only. Answers questions appropriately, but often becomes tangential. Face symmetric, speech intact. Moves all extremities.         Medical Decision Making       60 MINUTES SPENT BY  ME on the date of service doing chart review, history, exam, documentation & further activities per the note.      Data     I have personally reviewed the following data over the past 24 hrs:    12.3 (H)  \   11.5 (L)   / 220     138 106 12.5 /  117 (H)   4.3 24 0.68 \       Imaging results reviewed over the past 24 hrs:   Recent Results (from the past 24 hour(s))   Elbow XR, 2 view, left    Narrative    EXAM: XR ELBOW LEFT 2 VIEWS  LOCATION: [facility]  DATE/TIME: 5/30/2023 4:53 PM CDT    INDICATION: Left elbow pain. Recent fall.  COMPARISON: None.      Impression    IMPRESSION: Acute fracture, dislocation of the left elbow with dorsal and radial-sided dislocation of the proximal radius and ulna and a comminuted fracture of the lateral epicondyle and capitellum. In addition, there is approximately 90 degrees   resulting rotation of the elbow.   Cervical spine CT w/o contrast    Narrative    EXAM: CT HEAD W/O CONTRAST, CT CERVICAL SPINE W/O CONTRAST  LOCATION: [facility]  DATE/TIME: 5/30/2023 6:32 PM CDT    INDICATION: fall, dementia, head injury, neck injury.  COMPARISON: None.  TECHNIQUE:   1) Routine CT Head without IV contrast. Multiplanar reformats. Dose reduction techniques were used.  2) Routine CT Cervical Spine without IV contrast. Multiplanar reformats. Dose reduction techniques were used.    FINDINGS:   HEAD CT:   INTRACRANIAL CONTENTS: No intracranial hemorrhage, extraaxial collection, or mass effect.  No CT evidence of acute infarct. Mild presumed chronic small vessel ischemic changes. Mild to moderate generalized volume loss. No hydrocephalus.     VISUALIZED ORBITS/SINUSES/MASTOIDS: No intraorbital abnormality. No paranasal sinus mucosal disease. No middle ear or mastoid effusion.    BONES/SOFT TISSUES: No acute abnormality.    CERVICAL SPINE CT:   VERTEBRA: No fracture or posttraumatic subluxation. Straightening of alignment. Mild anterolisthesis  C4-C5, 3 mm.    CANAL/FORAMINA: At C5-C6 there is severe left and moderate right foraminal stenosis.    PARASPINAL: No extraspinal abnormality. Visualized lung fields are clear.      Impression    IMPRESSION:  HEAD CT:  1.  No CT evidence for acute intracranial process.  2.  Brain atrophy and presumed chronic microvascular ischemic changes as above.    CERVICAL SPINE CT:  1.  No CT evidence for acute fracture or post traumatic subluxation.   Head CT w/o contrast    Narrative    EXAM: CT HEAD W/O CONTRAST, CT CERVICAL SPINE W/O CONTRAST  LOCATION: [facility]  DATE/TIME: 5/30/2023 6:32 PM CDT    INDICATION: fall, dementia, head injury, neck injury.  COMPARISON: None.  TECHNIQUE:   1) Routine CT Head without IV contrast. Multiplanar reformats. Dose reduction techniques were used.  2) Routine CT Cervical Spine without IV contrast. Multiplanar reformats. Dose reduction techniques were used.    FINDINGS:   HEAD CT:   INTRACRANIAL CONTENTS: No intracranial hemorrhage, extraaxial collection, or mass effect.  No CT evidence of acute infarct. Mild presumed chronic small vessel ischemic changes. Mild to moderate generalized volume loss. No hydrocephalus.     VISUALIZED ORBITS/SINUSES/MASTOIDS: No intraorbital abnormality. No paranasal sinus mucosal disease. No middle ear or mastoid effusion.    BONES/SOFT TISSUES: No acute abnormality.    CERVICAL SPINE CT:   VERTEBRA: No fracture or posttraumatic subluxation. Straightening of alignment. Mild anterolisthesis C4-C5, 3 mm.    CANAL/FORAMINA: At C5-C6 there is severe left and moderate right foraminal stenosis.    PARASPINAL: No extraspinal abnormality. Visualized lung fields are clear.      Impression    IMPRESSION:  HEAD CT:  1.  No CT evidence for acute intracranial process.  2.  Brain atrophy and presumed chronic microvascular ischemic changes as above.    CERVICAL SPINE CT:  1.  No CT evidence for acute fracture or post traumatic subluxation.   XR  Pelvis and Hip Bilateral 2 Views    Narrative    EXAM: XR PELVIS AND HIP BILATERAL 2 VIEWS  LOCATION: [facility]  DATE/TIME: 5/30/2023 9:41 PM CDT    INDICATION: Pelvic pain. Recent fall.  COMPARISON: None.      Impression    IMPRESSION: No fracture or dislocation. Mild degenerative changes in both hips slightly greater on the right. Surgical clips in the pelvis.   XR Shoulder Left 1 View    Narrative    EXAM: XR SHOULDER LEFT 1 VIEW  LOCATION: [facility]  DATE/TIME: 5/30/2023 9:42 PM CDT    INDICATION: Shoulder pain.  COMPARISON: None.      Impression    IMPRESSION: Normal left humerus. Partially visualized is a fracture-dislocation of the left elbow as better seen on today's dedicated radiographs of the elbow.   XR Wrist Left 2 Views    Narrative    EXAM: XR WRIST LEFT 2 VIEWS  LOCATION: [facility]  DATE/TIME: 5/30/2023 9:42 PM CDT    INDICATION: Elbow pain.  COMPARISON: None.      Impression    IMPRESSION: No fracture. Advanced degenerative changes at the STT and first CMC joints. Osteopenia.   XR Surgery LEONOR L/T 5 Min Fluoro    Narrative    This exam was marked as non-reportable because it will not be read by a   radiologist or a [place] non-radiologist provider.

## 2023-05-31 NOTE — ED PROVIDER NOTES
[facility] EMERGENCY DEPARTMENT ([facility])    5/31/23      ED PROVIDER NOTE  ED01   History     Chief Complaint   Patient presents with     Elbow Injury     Fell around 1500, no LOC head CT clear, broken L elbow. Hx of dementia.      The history is provided by medical records. History limited by: dementia.     Gabriela Agrawal is a 89 year old female with a past medical history of dementia and HTN who was transferred from [facility] for further evaluation and management of left elbow injury and fracture after an unwitnessed fall.  Patient does not remember how she fell but staff at her assisted living facility heard her fall and scream at around 3pm earlier today. She was brought to [facility] for initial evaluation.  At that encounter staff reported that they did not think she hit her head or lost consciousness.  She was evaluated with CT of the head, C-spine, left elbow x-ray, x-ray of the left shoulder, hip, pelvis and left wrist.  X-rays were notable for acute fracture and dislocation of the left elbow with dorsal and radial-sided dislocation of the proximal radius and ulna and a comminuted fracture of the lateral epicondyle and capitellum.  In addition, there is approximately 90 degrees resulting rotation of the elbow. Case was discussed with orthopedics, plan was made for transfer here for further evaluation and management with Ortho consult.  She was splinted to provide comfort and protection prior to transport.  Patient tolerated transfer well.  Here patient denies any pain or shortness of breath at this time. Patient is not on anticoagulants.     XR ELBOW LEFT 2 VIEWS 5/30/2023 4:53 PM CDT  Acute fracture, dislocation of the left elbow with dorsal and radial-sided dislocation of the proximal radius and ulna and a comminuted fracture of the lateral epicondyle and capitellum. In addition, there is approximately 90 degrees resulting rotation of the elbow.    Past Medical History  Past  Medical History:   Diagnosis Date     Basal cell carcinoma      Bronchiolitis obliterans (H)      Diffuse cystic mastopathy     Bresat Fibrocystic Disease     Disorder of bone and cartilage, unspecified      Malignant melanoma (H)      Nonspecific (abnormal) findings on radiological and other examination of lung field      Other abnormal blood chemistry      Other specified alveolar and parietoalveolar pneumonopathies      Squamous cell carcinoma      Syncope and collapse      holter neg     Past Surgical History:   Procedure Laterality Date     APPENDECTOMY OPEN       BREAST SURGERY      biopsy     COLONOSCOPY  2013    Procedure: COLONOSCOPY;  Colonoscopy  ;  Surgeon: Norah Whaley MD;  Location: [facility]     EYE SURGERY      keratoplasty     HERNIA REPAIR      left inginal     HYSTERECTOMY      including ovaries     KNEE SURGERY      ,      LUNG SURGERY      biopsy     melanoma  resection       acetaminophen (TYLENOL) 500 MG tablet  Calcium Carbonate-Vitamin D (CALCIUM + D PO)  cholecalciferol (VITAMIN D) 1000 UNIT tablet  citalopram (CELEXA) 20 MG tablet  donepezil (ARICEPT) 10 MG tablet  losartan (COZAAR) 50 MG tablet  Multiple Vitamins-Minerals (CEROVITE SENIOR) TABS  vitamin E (TOCOPHEROL) 1000 units (900 mg) capsule      Allergies   Allergen Reactions     Nkda [No Known Drug Allergy]      Family History  Family History   Problem Relation Age of Onset     Cancer Other      Family History Negative Other      Social History   Social History     Tobacco Use     Smoking status: Former     Packs/day: 1.50     Years: 22.50     Pack years: 33.75     Types: Cigarettes     Quit date: 1982     Years since quittin.4     Smokeless tobacco: Never   Substance Use Topics     Alcohol use: Yes     Comment: 4-6 a week     Drug use: No         A medically appropriate review of systems was performed with pertinent positives and negatives noted in the HPI, and all other systems  negative.    Physical Exam   BP: (!) 141/69  Pulse: 67  Temp: 98.2  F (36.8  C)  Resp: 18  SpO2: 91 %  Physical Exam  Constitutional:       General: She is not in acute distress.     Appearance: She is not diaphoretic.   HENT:      Head: Normocephalic and atraumatic.      Right Ear: External ear normal.      Left Ear: External ear normal.      Nose: Nose normal.   Eyes:      Extraocular Movements: Extraocular movements intact.      Conjunctiva/sclera: Conjunctivae normal.   Cardiovascular:      Rate and Rhythm: Normal rate and regular rhythm.      Heart sounds: Normal heart sounds.   Pulmonary:      Effort: Pulmonary effort is normal. No respiratory distress.      Breath sounds: Normal breath sounds.   Abdominal:      General: There is no distension.      Palpations: Abdomen is soft.      Tenderness: There is no abdominal tenderness.   Musculoskeletal:         General: No swelling or deformity.      Cervical back: Normal range of motion and neck supple.      Comments: Left elbow in a splint.  There is significant swelling of the elbow joint.  Multiple skin tears of the left forearm   Skin:     General: Skin is warm and dry.   Neurological:      Mental Status: Mental status is at baseline.      Comments: Alert, oriented to person   Psychiatric:         Mood and Affect: Mood normal.         Behavior: Behavior normal.           ED Course, Procedures, & Data      [facility]    Procedure: Procedural sedation    Date/Time: 5/31/2023 3:01 AM    Performed by: Clifford Aguillon DO  Authorized by: Clifford Aguillon DO    Risks, benefits and alternatives discussed.    ED EVALUATION:      Assessment Time: 5/31/2023 1:02 AM      I have performed an Emergency Department Evaluation including taking a history and physical examination, this evaluation will be documented in the electronic medical record for this ED encounter.     Indication: Fracture dislocation  reduction    ASA Class: Class 2- mild systemic disease, no acute problems, no functional limitations    Mallampati: Grade 1- soft palate, uvula, tonsillar pillars, and posterior pharyngeal wall visible    NPO Status: appropriately NPO for procedure    UNIVERSAL PROTOCOL   Site Marked: Yes  Prior Images Obtained and Reviewed:  Yes  Required items: Required blood products, implants, devices and special equipment available    Patient identity confirmed:  Verbally with patient and arm band  Patient was reevaluated immediately before administering moderate or deep sedation or anesthesia  Confirmation Checklist:  Patient's identity using two indicators, relevant allergies, procedure was appropriate and matched the consent or emergent situation and correct equipment/implants were available  Time out: Immediately prior to the procedure a time out was called    Universal Protocol: the Joint Commission Universal Protocol was followed    Preparation: Patient was prepped and draped in usual sterile fashion      SEDATION  Patient Sedated: Yes    Sedation:  See MAR for details, etomidate and propofol  Vital signs: Vital signs monitored during sedation      PROCEDURE  Describe Procedure: Sedation was maintained until the procedure was complete. The patient was monitored by staff until recovered.  Length of time physician/provider present for 1:1 monitoring during sedation: 20                       No results found for any visits on 05/31/23.  Medications   HYDROmorphone (PF) (DILAUDID) injection 0.3 mg (has no administration in time range)   etomidate (AMIDATE) injection 20 mg (10 mg Intravenous $Given 5/31/23 0123)   propofol (DIPRIVAN) 200 MG/20ML injection (20 mg  $Given 5/31/23 0133)   HYDROmorphone (PF) (DILAUDID) 0.5 MG/0.5 ML injection (0.3 mg  $Given 5/31/23 0249)     Labs Ordered and Resulted from Time of ED Arrival to Time of ED Departure - No data to display  XR Surgery LEONOR L/T 5 Min Fluoro    (Results Pending)   XR  Elbow Left 2 Views    (Results Pending)            Medical Decision Making  The patient's presentation is strongly suggestive of high complexity (an acute health issue posing potential threat to life or bodily function).    The patient's evaluation involved:  review of external note(s) from 3+ sources (Most recent H&P in addition to clinic and ED note)  review of 2 test result(s) ordered prior to this encounter (Most recent BMP and CBC)  Management discussed with another healthcare provider orthopedic surgery resident    The patient's management involved high risk (a parenteral controlled substance), high risk (drug therapy requiring intensive monitoring (see separate area of note for details)) and high risk (a decision regarding hospitalization).      Assessment & Plan    89-year-old female presents to us with a chief complaint of elbow fracture dislocation from a previous institution.  Patient underwent procedural sedation for fracture dislocation reduction.  This was performed by the orthopedic surgery team.  Alignment improved but due to fracture fragments the reduction was not optimal.  Patient will need to be admitted to the hospital and will likely require operative intervention.    I have reviewed the nursing notes. I have reviewed the findings, diagnosis, plan and need for follow up with the patient.    New Prescriptions    No medications on file       Final diagnoses:   Closed fracture dislocation of left elbow, initial encounter     Clifford Aguillon DO    [facility] EMERGENCY DEPARTMENT  5/30/2023     Clifford Aguillon DO  05/31/23 0305

## 2023-05-31 NOTE — ED NOTES
Writer contacted pt son, Otoniel at [phone] in regards of transfer. Writer stated pt is transferring to [facility] for ortho. Per Otoniel, he would like [facility] to contact him for updates.

## 2023-05-31 NOTE — ED TRIAGE NOTES
Pt fell on 5/30 at approximately 1500. No LOC.   Pt has history of dementia     Triage Assessment     Row Name 05/31/23 0015       Triage Assessment (Adult)    Airway WDL WDL       Respiratory WDL    Respiratory WDL WDL       Skin Circulation/Temperature WDL    Skin Circulation/Temperature WDL WDL       Cardiac WDL    Cardiac WDL WDL       Peripheral/Neurovascular WDL    Peripheral Neurovascular WDL WDL       Cognitive/Neuro/Behavioral WDL    Cognitive/Neuro/Behavioral WDL WDL

## 2023-05-31 NOTE — PHARMACY-ADMISSION MEDICATION HISTORY
Pharmacist Admission Medication History    Admission medication history is complete. The information provided in this note is only as accurate as the sources available at the time of the update.    Medication reconciliation/reorder completed by provider prior to medication history? Yes    Information Source(s): Facility ([facility]/) medication list/MAR via N/A    Pertinent Information: None    Changes made to PTA medication list:    Added: None    Deleted: None    Changed: None      Allergies reviewed with patient and updates made in EHR: by RN     Medication History Completed By: Yamil Tapia RPH 5/31/2023 5:45 PM    Prior to Admission medications    Medication Sig Last Dose Taking? Auth Provider Long Term End Date   acetaminophen (TYLENOL) 500 MG tablet Take 1,000 mg by mouth 2 times daily 5/30/2023 at AM Yes Reported, Patient     calcium citrate-vitamin D (CITRACAL) 315-5 MG-MCG TABS per tablet Take 2 tablets by mouth daily 5/30/2023 at AM Yes Unknown, Entered By History     citalopram (CELEXA) 20 MG tablet Take 20 mg by mouth every evening 5/29/2023 at 8pm Yes Reported, Patient Yes    donepezil (ARICEPT) 10 MG tablet Take 1 tablet (10 mg) by mouth At Bedtime  Patient taking differently: Take 10 mg by mouth every morning 5/30/2023 at 8am Yes Coco Mejia DO     losartan (COZAAR) 50 MG tablet Take 50 mg by mouth daily 5/30/2023 at AM Yes Reported, Patient Yes    Multiple Vitamins-Minerals (CEROVITE SENIOR) TABS Take 1 tablet by mouth daily 5/30/2023 at AM Yes Reported, Patient     Vitamin D3 (CHOLECALCIFEROL) 25 mcg (1000 units) tablet Take 1 tablet by mouth daily  5/30/2023 at AM Yes Americo Mercado MD     vitamin E (TOCOPHEROL) 1000 units (900 mg) capsule Take 2 capsules (2,000 Units) by mouth daily 5/30/2023 at AM Yes Coco Mejia DO

## 2023-05-31 NOTE — PROGRESS NOTES
Orthopaedic Surgery Progress Note 05/31/2023    Interval Events  - Transferred from [facility] with left elbow fracture-dislocation  - Closed reduction attempt in ED, gross instability, fleeting reduction.  NV intact  - Post reduction XR and CT pending    Subjective  No acute events overnight.  Pain controlled in splint. Denies new numbness, tingling, or weakness.  NPO.  Pleasantly demented this AM    Objective  Temp: 98.1  F (36.7  C) Temp src: Oral BP: (!) 140/62 Pulse: 66   Resp: 16 SpO2: 93 % O2 Device: None (Room air) Oxygen Delivery: 3 LPM    Exam:  Gen: No acute distress, resting comfortably in bed.  Resp: Non-labored breathing  Cardio: Regular rate via peripheral pulse  MSK:  LUE:  - Splint/dressing c/d/I  - Fires EPL, FPL, Intrinsics  - SILT median/radial/ulnar nerves  - Hand wwp    Recent Labs   Lab 05/30/23  1738   WBC 12.3*   HGB 11.5*          Imaging: Post reduction XR and CT pending    Assessment: Gabriela Agrawal is a 89 year old female with PMH significant for dementia and HTN who transferred to [facility] ED from [facility] with left elbow fracture dislocation. Skin intact on exam. NVI on exam.     On the injury film there appeared to be shear fractures to the trochlear and capitellum. There appeared to be incarcerated fragments within the trochlear groove. The patient underwent an attempted sedated closed reduction and splinting of the left upper extremity. This was unsuccessful in obtaining concentric reduction.  The elbow was significantly unstable during reduction attempts.  The elbow was splinted in an area more near anatomic and attempt to take any tension off of soft tissue structures in the upper extremity.  After the reduction the patient was able to fire EPL, FPL and interossei.  Her fingers remained warm and well-perfused.      As the elbow is still dislocated we will plan for admission to the hospital and discussion with staff on possible surgical intervention.    Plan:  Medicine  Primary, Orthopaedics Consulting  - Plan for OR: TBD, pending discussion with staff   - Anticoagulation/DVT prophylaxis: Hold in the setting of possible OR  - Antibiotics: Perioperative ancef ordered   - Imaging: Post splint XR/CT of the elbow ordered  - Activity: Sling for comfort  - Splint: Keep long arm splint clear and dry  - Weight bearing: NWB on the LUE.  - Pain control: IV and oral options. Avoid opioid medications as possible  - Diet: NPO pending staff discussion    - Follow-up: TBD   - Disposition: TBD    Kip Trammell MD  Orthopaedic Surgery PGY-4  [phone]    Please page me at [phone] with any questions/concerns. If there is no response, if it is a weekend, or if it is during evening hours then please page the orthopaedic surgery resident on call.       No future appointments.    Addendum:   A call was received by patient's son.  He was calling to request an update on the status of his family member and what the current plan was.  It was discussed with the patient that the patient has been discussed with a couple of the faculty and at this current time the recommendation is to proceed forward with a a nonoperative strategy.  Patient's fracture pattern alongside patient's medical status makes it difficult for her to proceed forward with surgical interventions that would provide significant improvement.  At this time patient is thought to be best served with continuing immobilization and attempting to allow fracture to consolidate in his current position.  At future time further intervention can be considered.  Plan will be for Dr. Hassan to take a additional look at patient's imaging and provide insight for potential additional interventions.  Patient's son expressed understanding.     Thank you,    Farhad Fay MD   PGY1  Department of Orthopaedic Surgery  Pager [phone]

## 2023-05-31 NOTE — PROGRESS NOTES
Patient seen and examined.  She was transferred from [facility] ED where a closed reduction of left elbow fracture dislocation was attempted.    Orthopedic surgery consulted.  Patient remains n.p.o. for possible operative reduction internal fixation.  X-ray of the left  elbow and CT of the left upper extremity has been completed.  Patient has a splint.  She did receive Tylenol since morning and was not having a lot of pain complaints.  She is from a memory care.  She is pleasantly confused    History of dementia    Denies any dizziness, shortness of breath, chest pain.  On exam vital signs are stable.  Lungs clear to auscultation.  ROM regular rhythm, no any murmur or gallop  No edema on the lower extremities  /52 (BP Location: Right arm)   Pulse 64   Temp 98.2  F (36.8  C) (Oral)   Resp 16   SpO2 96%   Last Comprehensive Metabolic Panel:  Lab Results   Component Value Date     05/30/2023    POTASSIUM 4.3 05/30/2023    CHLORIDE 106 05/30/2023    CO2 24 05/30/2023    ANIONGAP 8 05/30/2023     (H) 05/30/2023    BUN 12.5 05/30/2023    CR 0.68 05/30/2023    GFRESTIMATED 83 05/30/2023    TESFAYE 8.8 05/30/2023       Lab Results   Component Value Date    WBC 12.3 05/30/2023    WBC 8.5 03/03/2020     Lab Results   Component Value Date    RBC 3.94 05/30/2023    RBC 5.40 03/03/2020     Lab Results   Component Value Date    HGB 11.5 05/30/2023    HGB 15.5 03/03/2020     Lab Results   Component Value Date    HCT 35.5 05/30/2023    HCT 48.1 03/03/2020     No components found for: MCT  Lab Results   Component Value Date    MCV 90 05/30/2023    MCV 89 03/03/2020     Lab Results   Component Value Date    MCH 29.2 05/30/2023    MCH 28.7 03/03/2020     Lab Results   Component Value Date    MCHC 32.4 05/30/2023    MCHC 32.2 03/03/2020     Lab Results   Component Value Date    RDW 14.2 05/30/2023    RDW 14.7 03/03/2020     Lab Results   Component Value Date     05/30/2023     03/03/2020

## 2023-05-31 NOTE — PLAN OF CARE
VS: BP (!) 140/62   Pulse 66   Temp 98.1  F (36.7  C)   Resp 16   SpO2 93%     O2: Stable on room air>90%. Denies SOB and Chest pain   Output: Incontinent of bladder. Brief in place   Last BM: Unknown.pt can not remember   Activity: Not OOB.    Skin: Intact expect  left elbow fracture. Unable to assess, it all wrapped up with ACE wrap   Pain: Minimal pain   CMS: AXO to self only.    Dressing: LLE  on splint and ACE wrap   Diet: NPO   LDA: Right PIV-SL   Equipment: PT Belongings   Plan: TBD   Additional Info: Patient has Dementia  And Confused. Bed alarm on.    Patient needs CT and X-ray done. Pt is refusing.  COVID culture colleted at 0700  .

## 2023-06-01 ENCOUNTER — APPOINTMENT (OUTPATIENT)
Dept: OCCUPATIONAL THERAPY | Facility: CLINIC | Age: 88
End: 2023-06-01
Attending: INTERNAL MEDICINE
Payer: COMMERCIAL
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
## 2023-06-01 PROBLEM — S42.432A: Status: ACTIVE | Noted: 2023-06-01

## 2023-06-01 PROBLEM — S42.452A CLOSED DISPLACED FRACTURE OF LATERAL CONDYLE OF LEFT HUMERUS, INITIAL ENCOUNTER: Status: ACTIVE | Noted: 2023-06-01

## 2023-06-01 LAB
ANION GAP SERPL CALCULATED.3IONS-SCNC: 11 MMOL/L (ref 7–15)
BUN SERPL-MCNC: 12.7 MG/DL (ref 8–23)
CALCIUM SERPL-MCNC: 9.1 MG/DL (ref 8.8–10.2)
CHLORIDE SERPL-SCNC: 103 MMOL/L (ref 98–107)
CREAT SERPL-MCNC: 0.66 MG/DL (ref 0.51–0.95)
DEPRECATED HCO3 PLAS-SCNC: 23 MMOL/L (ref 22–29)
ERYTHROCYTE [DISTWIDTH] IN BLOOD BY AUTOMATED COUNT: 14.4 % (ref 10–15)
GFR SERPL CREATININE-BSD FRML MDRD: 83 ML/MIN/1.73M2
GLUCOSE SERPL-MCNC: 98 MG/DL (ref 70–99)
HCT VFR BLD AUTO: 38.7 % (ref 35–47)
HGB BLD-MCNC: 12.1 G/DL (ref 11.7–15.7)
MCH RBC QN AUTO: 28.8 PG (ref 26.5–33)
MCHC RBC AUTO-ENTMCNC: 31.3 G/DL (ref 31.5–36.5)
MCV RBC AUTO: 92 FL (ref 78–100)
PLATELET # BLD AUTO: 229 10E3/UL (ref 150–450)
POTASSIUM SERPL-SCNC: 4 MMOL/L (ref 3.4–5.3)
RBC # BLD AUTO: 4.2 10E6/UL (ref 3.8–5.2)
SODIUM SERPL-SCNC: 137 MMOL/L (ref 136–145)
WBC # BLD AUTO: 7.6 10E3/UL (ref 4–11)

## 2023-06-01 PROCEDURE — 82310 ASSAY OF CALCIUM: CPT | Performed by: STUDENT IN AN ORGANIZED HEALTH CARE EDUCATION/TRAINING PROGRAM

## 2023-06-01 PROCEDURE — 250N000013 HC RX MED GY IP 250 OP 250 PS 637: Performed by: STUDENT IN AN ORGANIZED HEALTH CARE EDUCATION/TRAINING PROGRAM

## 2023-06-01 PROCEDURE — G0378 HOSPITAL OBSERVATION PER HR: HCPCS

## 2023-06-01 PROCEDURE — 97530 THERAPEUTIC ACTIVITIES: CPT | Mod: GO

## 2023-06-01 PROCEDURE — 36415 COLL VENOUS BLD VENIPUNCTURE: CPT | Performed by: STUDENT IN AN ORGANIZED HEALTH CARE EDUCATION/TRAINING PROGRAM

## 2023-06-01 PROCEDURE — 99231 SBSQ HOSP IP/OBS SF/LOW 25: CPT | Performed by: INTERNAL MEDICINE

## 2023-06-01 PROCEDURE — 97165 OT EVAL LOW COMPLEX 30 MIN: CPT | Mod: GO

## 2023-06-01 PROCEDURE — 85027 COMPLETE CBC AUTOMATED: CPT | Performed by: STUDENT IN AN ORGANIZED HEALTH CARE EDUCATION/TRAINING PROGRAM

## 2023-06-01 RX ADMIN — OXYCODONE HYDROCHLORIDE 2.5 MG: 5 TABLET ORAL at 21:26

## 2023-06-01 RX ADMIN — ACETAMINOPHEN 1000 MG: 500 TABLET ORAL at 09:37

## 2023-06-01 RX ADMIN — DONEPEZIL HYDROCHLORIDE 10 MG: 10 TABLET ORAL at 09:37

## 2023-06-01 RX ADMIN — Medication 1 MG: at 21:32

## 2023-06-01 RX ADMIN — ACETAMINOPHEN 1000 MG: 500 TABLET ORAL at 19:55

## 2023-06-01 RX ADMIN — CITALOPRAM HYDROBROMIDE 20 MG: 20 TABLET ORAL at 19:55

## 2023-06-01 ASSESSMENT — ACTIVITIES OF DAILY LIVING (ADL)
ADLS_ACUITY_SCORE: 47

## 2023-06-01 NOTE — PROGRESS NOTES
[facility]  OUTPATIENT OCCUPATIONAL THERAPY  EVALUATION  PLAN OF TREATMENT FOR OUTPATIENT REHABILITATION  (COMPLETE FOR INITIAL CLAIMS ONLY)  Patient's Last Name, First Name, M.I.  YOB: 1933  Gabriela Agrawal                          Provider's Name  [facility] Medical Record No.  [id number]                             Onset Date:  05/31/23   Start of Care Date:  06/01/23   Type:     ___PT   _X_OT   ___SLP Medical Diagnosis:  s/p L elbow fracture dislocation                    OT Diagnosis:  Decreased IND with ADL Visits from SOC:  1     See note for plan of treatment, functional goals and certification details    I CERTIFY THE NEED FOR THESE SERVICES FURNISHED UNDER        THIS PLAN OF TREATMENT AND WHILE UNDER MY CARE     (Physician co-signature of this document indicates review and certification of the therapy plan).                               06/01/23 1000   Appointment Info   Signing Clinician's Name / Credentials (OT) Bruna Kowalski MA OTR/L   Rehab Comments (OT) L elbow splinted, NWB L UE   Living Environment   People in Home facility resident   Current Living Arrangements assisted living  (memory care)   Living Environment Comments Poor historian due to known dementia. per chart, pt resides in memory care.   Self-Care   Fall history within last six months yes   Number of times patient has fallen within last six months 1   General Information   Onset of Illness/Injury or Date of Surgery 05/31/23   Referring Physician Claus Bruno MD   Patient/Family Therapy Goal Statement (OT) Did not endorse   Additional Occupational Profile Info/Pertinent History of Current Problem pt with left elbow fracture dislocation   Existing Precautions/Restrictions fall;weight bearing   Left Upper Extremity (Weight-bearing Status) non weight-bearing (NWB)   General Observations and Info Pt with L long arm splint, confused   Cognitive Status Examination    Orientation Status not oriented to person, place or time   Cognitive Status Comments pt with hx of dementia, resides in memory care facility.   Visual Perception   Visual Impairment/Limitations WNL   Pain Assessment   Patient Currently in Pain Yes, see Vital Sign flowsheet   Range of Motion Comprehensive   Comment, General Range of Motion R UE WFL, L UE not tested secondary to splint   Strength Comprehensive (MMT)   Comment, General Manual Muscle Testing (MMT) Assessment R UE WFL, L UE not tested secondary to precautions   Coordination   Upper Extremity Coordination Left UE impaired   Functional Limitations Impaired ability to perform bilateral tasks   Bed Mobility   Comment (Bed Mobility) Overall mod A   Transfers   Transfer Comments Pt declined despite encouragement and multiple attempts/strategies. Per RN report, pt has been up and walking around.   Bathing Assessment/Intervention   [place] Level (Bathing) maximum assist (25% patient effort)   Upper Body Dressing Assessment/Training   [place] Level (Upper Body Dressing) maximum assist (25% patient effort)   Lower Body Dressing Assessment/Training   [place] Level (Lower Body Dressing) maximum assist (25% patient effort)   Grooming Assessment/Training   [place] Level (Grooming) moderate assist (50% patient effort)   Toileting   [place] Level (Toileting) maximum assist (25% patient effort)   Clinical Impression   Criteria for Skilled Therapeutic Interventions Met (OT) Yes, treatment indicated   OT Diagnosis Decreased IND with ADL   OT Problem List-Impairments impacting ADL problems related to;activity tolerance impaired;cognition;pain;post-surgical precautions   Assessment of Occupational Performance 3-5 Performance Deficits   Identified Performance Deficits dressing, bathing, toileting, transfers, home mgmt   Planned Therapy Interventions (OT) ADL retraining   Clinical Decision Making Complexity (OT) low complexity   Risk & Benefits of  therapy have been explained evaluation/treatment results reviewed;care plan/treatment goals reviewed;patient   OT Total Evaluation Time   OT Eval, Low Complexity Minutes (41268) 6   Therapy Certification   Start of Care Date 06/01/23   Certification date from 06/01/23   Certification date to 06/08/23   Medical Diagnosis s/p L elbow fracture dislocation   OT Goals   Therapy Frequency (OT) Daily   OT Predicted Duration/Target Date for Goal Attainment 06/04/23   OT Goals Upper Body Dressing;Lower Body Dressing   OT: Upper Body Dressing Minimal assist;within precautions   OT: Lower Body Dressing Minimal assist;within precautions   OT: Toilet Transfer/Toileting Minimal assist;toilet transfer;cleaning and garment management;using adaptive equipment;within precautions   Therapeutic Activities   Therapeutic Activity Minutes (40687) 10   Symptoms noted during/after treatment fatigue;increased pain   Treatment Detail/Skilled Intervention Patient resting in bed upon arrival. Pt not oriented to person, place, time. Follows one step commands, but pt very hesitant, not wanting to do much during session. With encouragement, pt agreeable to sitting EOB. Required overall mod A (min A at trunk, min A at LE's). Once sitting, attempted to get pt to stand to assess safe mobility prior to dc. Attempted multiple times with various strategies, pt declined and wanted to return to bed. Min A sit>supine. Bed alarm on. Spoke with RN to encouarge pt mobilize to commode/bathroom with staff assist.   OT Discharge Planning   OT Plan OT: ambulate to BR, dressing tasks   OT Discharge Recommendation (DC Rec) home with assist;home with home care occupational therapy   OT Rationale for DC Rec Very limited eval as pt unwilling to mobilize beyond sitting EOB. Patient resides in memory care facility and has baseline cognitive deficits. Pt will need assist from staff for ADLs/IADLs. Pt will need to demonstrate ability to safely mobilize whether with  therapy or RN staff prior to dc.   OT Brief overview of current status Mod A bed mobility   Total Session Time   Timed Code Treatment Minutes 10   Total Session Time (sum of timed and untimed services) 16

## 2023-06-01 NOTE — PROGRESS NOTES
VS: /66 (BP Location: Right arm)   Pulse 71   Temp (!) 96.3  F (35.7  C) (Oral)   Resp 16   SpO2 95%    O2: RA   Output: incont of urine   Last BM: ?   Activity: SBA with walker & gait belt   Skin: Intact except for LUE incision   Pain: getting scheduled tylenol   CMS: intact   Dressing: LUE ace wrap, CDI   Diet: reg   LDA: None   Equipment: Walker and gait belt   Plan: Discharge back to memory care facility   Additional Info:      NEEDS BED ALARM

## 2023-06-01 NOTE — PROGRESS NOTES
Orthopaedic Surgery Progress Note 06/01/2023    Interval Events  - Patient's fracture pattern alongside patient's medical status makes it difficult for her to proceed forward with surgical interventions that would provide significant improvement.  At this time patient is thought to be best served with continuing immobilization and attempting to allow fracture to consolidate in his current position.  At future time further intervention can be considered.  - AFVSS    Subjective  No acute events overnight.  Pleasantly demented, A&O to person only.  Pain seemingly controlled. Denies new numbness, tingling, or weakness.     Objective  Temp: 97.5  F (36.4  C) Temp src: Oral BP: 115/57 Pulse: 74   Resp: 16 SpO2: 94 % O2 Device: None (Room air)      Exam:  Gen: No acute distress, resting comfortably in bed.  Resp: Non-labored breathing  Cardio: Regular rate via peripheral pulse  MSK:  LUE:  - Splint/dressing c/d/I  - Fires EPL, FPL, Intrinsics  - SILT median/radial/ulnar nerves  - Hand wwp    Recent Labs   Lab 05/30/23  1738   WBC 12.3*   HGB 11.5*          Assessment: Garbiela Agrawal is a 89 year old female with PMH significant for dementia and HTN who transferred to [facility] ED from [facility] with left elbow fracture dislocation. Skin intact on exam. NVI on exam.     Patient's fracture pattern alongside patient's medical status makes it difficult for her to proceed forward with surgical interventions that would provide significant improvement.  At this time patient is thought to be best served with continuing immobilization and attempting to allow fracture to consolidate in his current position.  At future time further intervention can be considered.    Dr. Montes ([facility] shoulder/upper extremity) has conversed with other upper extremity specialists and feel that there are no good surgical options and agree with current nonoperative management.  Will plan for follow-up with Dr. Hassan (hand/upper extremity)  within 1-2 weeks    Plan:  Medicine Primary, Orthopaedics Consulting  - Plan for OR: Nonop for now  - Anticoagulation/DVT prophylaxis: Per primary  - Antibiotics: None from orthopaedics perspective   - Imaging: Complete  - Activity: Sling for comfort  - Splint: Keep long arm splint clear and dry  - Weight bearing: NWB on the LUE.  - Pain control: IV and oral options. Avoid opioid medications as possible  - Diet: Regular diet     - Follow-up: Dr. Hassan in 1-2 weeks (schedulers messaged)   - Disposition: Okay to discharge from orthopaedics perspective    Kip Trammell MD  Orthopaedic Surgery PGY-4  [phone]    Please page me at [phone] with any questions/concerns. If there is no response, if it is a weekend, or if it is during evening hours then please page the orthopaedic surgery resident on call.       No future appointments.

## 2023-06-01 NOTE — PROGRESS NOTES
Patient seen and examined.  She was transferred from [facility] ED where a closed reduction of left elbow fracture dislocation was attempted.    Orthopedic surgery consulted.   No plan for surgery this hospitalization  Need to follow up as OP with ortho    X-ray of the left  elbow and CT of the left upper extremity has been completed.  Patient has a splint.  She did receive Tylenol since morning and was not having a lot of pain complaints.  She is from a memory care.  She is pleasantly confused    History of dementia    Denies any dizziness, shortness of breath, chest pain.  On exam vital signs are stable.  Lungs clear to auscultation.  ROM regular rhythm, no any murmur or gallop  No edema on the lower extremities  /66 (BP Location: Right arm)   Pulse 71   Temp (!) 96.3  F (35.7  C) (Oral)   Resp 16   SpO2 95%   Last Comprehensive Metabolic Panel:  Lab Results   Component Value Date     06/01/2023    POTASSIUM 4.0 06/01/2023    CHLORIDE 103 06/01/2023    CO2 23 06/01/2023    ANIONGAP 11 06/01/2023    GLC 98 06/01/2023    BUN 12.7 06/01/2023    CR 0.66 06/01/2023    GFRESTIMATED 83 06/01/2023    TESFAYE 9.1 06/01/2023       Lab Results   Component Value Date    WBC 12.3 05/30/2023    WBC 8.5 03/03/2020     Lab Results   Component Value Date    RBC 3.94 05/30/2023    RBC 5.40 03/03/2020     Lab Results   Component Value Date    HGB 11.5 05/30/2023    HGB 15.5 03/03/2020     Lab Results   Component Value Date    HCT 35.5 05/30/2023    HCT 48.1 03/03/2020     No components found for: MCT  Lab Results   Component Value Date    MCV 90 05/30/2023    MCV 89 03/03/2020     Lab Results   Component Value Date    MCH 29.2 05/30/2023    MCH 28.7 03/03/2020     Lab Results   Component Value Date    MCHC 32.4 05/30/2023    MCHC 32.2 03/03/2020     Lab Results   Component Value Date    RDW 14.2 05/30/2023    RDW 14.7 03/03/2020     Lab Results   Component Value Date     05/30/2023      03/03/2020

## 2023-06-01 NOTE — CARE PLAN
VS:    O2: Sating >90% on RA. Lung sounds clear. Denies chest pain and SOB.   Output: Voids spontaneously and adequately.    Last BM: Bowel sounds active x4. Passing flatus.    Activity: Up with 1 assist, FWW, and gait belt.    Skin: Intact   Pain: Pain was managed with Dilaudid, oxycodone    CMS: A&Ox4. Denies N/T.    Dressing: Dressing to  Left elbow C/D/I.   Diet: Regular. Appetite was good.  Denies N/V.    LDA: PIV to R  is S/L.    Equipment: IV pole, FWW, gait belt, and personal belongings. Call light within reach and uses appropriately.   Plan:  TBD   Additional Info: 2300 pt was very active, unable describe, multiple rediretions, pain med and sleeping med PRN administered. Slept rest of the night.

## 2023-06-01 NOTE — CARE PLAN
Shift: 1900 - 2300    VS:    O2: SpO2 > 90% and stable on RA. LS clear and equal bilaterally. Denies chest pain and SOB.    Output: Voids spontaneously without difficulty to bathroom / using beside urinal / BSC.   Last BM: No BM this shift, denies abdominal discomfort. BS active and  passing flatus.    Activity: Up with Assist of 1 with gait belt and walker.   Skin: Visible skin intact, dislocated elbow in soft cast and ace wrapped   Pain:  Denies Pain    CMS: Pt. Is disoriented to time, place and situation. Denies numbness and tingling.   Dressing: Left arm   Diet: Regular diet. Denies nausea/vomiting.    LDA: Right PIV SL    Equipment: IV pole, personal belongings,    Plan:  Continue with plan of care. Call light within reach but patient is confused. Bed alarm on.   Additional Info:

## 2023-06-01 NOTE — UTILIZATION REVIEW
"  Admission Status; Secondary Review Determination         Under the authority of the Utilization Management Committee, the utilization review process indicated a secondary review on the above patient.  The review outcome is based on review of the medical records, discussions with staff, and applying clinical experience noted on the date of the review.          (x) Observation Status Appropriate - This patient does not meet hospital inpatient criteria and is placed in observation status. If this patient's primary payer is Medicare and was admitted as an inpatient, Condition Code 44 should be used and patient status changed to \"observation\".     RATIONALE FOR DETERMINATION   89-year-old female with a past medical history of dementia and hypertension was transferred from [facility] to [facility] Emergency Department with a left elbow fracture dislocation. Despite the severity of the fracture, her overall medical status and the nature of the fracture deemed surgical interventions as not the most beneficial course of action. Instead, the decision was made to continue with immobilization and allow the fracture to consolidate in its current position.  In the meantime, the patient is to remain non-weight bearing on the left upper extremity, with a long arm splint kept clear and dry, and a sling for comfort. Pain control measures include both IV and oral options, with avoidance of opioid medications as much as possible. The patient is okay to be discharged from an orthopaedic perspective    The severity of illness, intensity of service provided, expected LOS and risk for adverse outcome make the care appropriate for further observation; however, doesn't meet criteria for hospital inpatient admission. Dr Gomez notified of this determination.    This document was produced using voice recognition software.      The information on this document is developed by the utilization review team in order for the business " office to ensure compliance.  This only denotes the appropriateness of proper admission status and does not reflect the quality of care rendered.         The definitions of Inpatient Status and Observation Status used in making the determination above are those provided in the CMS Coverage Manual, Chapter 1 and Chapter 6, section 70.4.      Sincerely,     NATASHA PEÑALOZA MD    System Medical Director  Utilization Management  [facility].

## 2023-06-02 ENCOUNTER — APPOINTMENT (OUTPATIENT)
Dept: OCCUPATIONAL THERAPY | Facility: CLINIC | Age: 88
End: 2023-06-02
Payer: COMMERCIAL

## 2023-06-02 ENCOUNTER — HEALTH MAINTENANCE LETTER (OUTPATIENT)
Age: 88
End: 2023-06-02

## 2023-06-02 VITALS
SYSTOLIC BLOOD PRESSURE: 136 MMHG | TEMPERATURE: 96.5 F | OXYGEN SATURATION: 94 % | RESPIRATION RATE: 17 BRPM | DIASTOLIC BLOOD PRESSURE: 62 MMHG | HEART RATE: 74 BPM

## 2023-06-02 PROCEDURE — 250N000013 HC RX MED GY IP 250 OP 250 PS 637: Performed by: STUDENT IN AN ORGANIZED HEALTH CARE EDUCATION/TRAINING PROGRAM

## 2023-06-02 PROCEDURE — G0378 HOSPITAL OBSERVATION PER HR: HCPCS

## 2023-06-02 PROCEDURE — 97530 THERAPEUTIC ACTIVITIES: CPT | Mod: GO

## 2023-06-02 PROCEDURE — 99231 SBSQ HOSP IP/OBS SF/LOW 25: CPT | Performed by: INTERNAL MEDICINE

## 2023-06-02 RX ADMIN — DONEPEZIL HYDROCHLORIDE 10 MG: 10 TABLET ORAL at 09:44

## 2023-06-02 RX ADMIN — ACETAMINOPHEN 1000 MG: 500 TABLET ORAL at 09:44

## 2023-06-02 RX ADMIN — OXYCODONE HYDROCHLORIDE 2.5 MG: 5 TABLET ORAL at 13:40

## 2023-06-02 RX ADMIN — OXYCODONE HYDROCHLORIDE 2.5 MG: 5 TABLET ORAL at 09:55

## 2023-06-02 ASSESSMENT — ACTIVITIES OF DAILY LIVING (ADL)
ADLS_ACUITY_SCORE: 48
ADLS_ACUITY_SCORE: 47
ADLS_ACUITY_SCORE: 48
ADLS_ACUITY_SCORE: 47
ADLS_ACUITY_SCORE: 48
ADLS_ACUITY_SCORE: 47

## 2023-06-02 NOTE — PROGRESS NOTES
Orthopaedic Surgery Progress Note 06/02/2023    Interval Events  - AFVSS    Subjective  No acute events overnight.  Pleasantly demented, A&O to person only this AM.  Follows commands but otherwise not able to meaningfully participate with history or exam    Objective  Temp: (!) 96.5  F (35.8  C) Temp src: Oral BP: (!) 158/77 Pulse: 84   Resp: 16 SpO2: 96 % O2 Device: None (Room air)      Exam:  Gen: No acute distress, resting comfortably in bed.  Resp: Non-labored breathing  Cardio: Regular rate via peripheral pulse  MSK:  LUE:  - Splint/dressing c/d/I  - Fires EPL, FPL, Intrinsics  - SILT median/radial/ulnar nerves  - Hand wwp    Recent Labs   Lab 06/01/23  0738 05/30/23  1738   WBC 7.6 12.3*   HGB 12.1 11.5*    220       Assessment: Gabriela Agrawal is a 89 year old female with PMH significant for dementia and HTN who transferred to [facility] ED from [facility] with left elbow fracture dislocation. Skin intact on exam. NVI on exam.      Patient's fracture pattern alongside patient's medical status makes it difficult for her to proceed forward with surgical interventions that would provide significant improvement.  At this time patient is thought to be best served with continuing immobilization and attempting to allow fracture to consolidate in his current position.  At future time further intervention can be considered.     Dr. Montes ([facility] shoulder/upper extremity) has conversed with other upper extremity specialists and feel that there are no good surgical options and agree with current nonoperative management.  Will plan for follow-up with Dr. Hassan (hand/upper extremity) within 1-2 weeks     Plan:  Medicine Primary, Orthopaedics Consulting  - Plan for OR: Nonop for now  - Anticoagulation/DVT prophylaxis: Per primary  - Antibiotics: None from orthopaedics perspective   - Imaging: Complete  - Activity: Sling for comfort  - Splint: Keep long arm splint clear and dry  - Weight bearing: NWB on the  LAMINE.  - Pain control: IV and oral options. Avoid opioid medications as possible  - Diet: Regular diet     - Follow-up: Dr. Hassan in 1-2 weeks (schedulers messaged)   - Disposition: Okay to discharge from orthopaedics perspective    Kip Trammell MD  Orthopaedic Surgery PGY-4  [phone]    Please page me at [phone] with any questions/concerns. If there is no response, if it is a weekend, or if it is during evening hours then please page the orthopaedic surgery resident on call.       Future Appointments   Date Time Provider Department Center   6/2/2023  7:00 PM Christal Alex, PT D[facility] [place]

## 2023-06-02 NOTE — PLAN OF CARE
VS: BP (!) 154/70 (BP Location: Right arm)   Pulse 81   Temp (!) 96.2  F (35.7  C) (Oral)   Resp 16   SpO2 94%      O2: Stable on room air   Output: Intermittent incontinence- up to bathroom. Brief on    Last BM: Unknown- pt does not remember and there is no documentation in flowsheets. Active bowel sounds   Activity: Ax2 with gait belt   Skin: Visible skin intact  L arm splint   Pain: C/o L arm pain managed with PRN and scheduled medications   CMS: Intact; tenderness to L arm   Dressing: L arm splint   Diet: Regular diet, thin liquids, takes pills whole one at a times   LDA: N/a   Equipment: Gait belt, personal belongings   Plan: Discharge back to memory care facility   Additional Info: Confused; intermittently alert to self. Denies headache, chest pain, SOB, numbness or tingling. BED AND CHAIR ALARM. Anticipate needs and round frequently.      Pt discharged at 1530 back to memory care facility via EMS. Personal belongings return. Pt had no further questions at this time.

## 2023-06-02 NOTE — PROGRESS NOTES
Patient seen and examined.  Subjectively: No any acute events overnight.  She is pleasantly confused.  Vital signs stable.  Pain is tolerable.  Plan to discharge back to the memory care when bed is available.    Assessment and plan    She was transferred from [facility] ED where a closed reduction of left elbow fracture dislocation was attempted.    Orthopedic surgery consulted.   No plan for surgery this hospitalization  Need to follow up as OP with ortho    X-ray of the left  elbow and CT of the left upper extremity has been completed.  Patient has a splint.  She did receive Tylenol since morning and was not having a lot of pain complaints.  She is from a memory care.  She is pleasantly confused    History of dementia    Denies any dizziness, shortness of breath, chest pain.     Physical exam  General appearance: Comfortable in no apparent distress.  BP (!) 154/70 (BP Location: Right arm)   Pulse 81   Temp (!) 96.2  F (35.7  C) (Oral)   Resp 16   SpO2 94%     Neck supple   Lungs clear to auscultation.    CVS:regular rhythm, no any murmur or gallop  Musculoskeletal: Left arm is splinted and Ace wrapped.  Can wiggle fingers  No edema on the lower extremities    Last Comprehensive Metabolic Panel:  Lab Results   Component Value Date     06/01/2023    POTASSIUM 4.0 06/01/2023    CHLORIDE 103 06/01/2023    CO2 23 06/01/2023    ANIONGAP 11 06/01/2023    GLC 98 06/01/2023    BUN 12.7 06/01/2023    CR 0.66 06/01/2023    GFRESTIMATED 83 06/01/2023    TESFAYE 9.1 06/01/2023       Lab Results   Component Value Date    WBC 12.3 05/30/2023    WBC 8.5 03/03/2020     Lab Results   Component Value Date    RBC 3.94 05/30/2023    RBC 5.40 03/03/2020     Lab Results   Component Value Date    HGB 11.5 05/30/2023    HGB 15.5 03/03/2020     Lab Results   Component Value Date    HCT 35.5 05/30/2023    HCT 48.1 03/03/2020     No components found for: MCT  Lab Results   Component Value Date    MCV 90 05/30/2023    MCV  89 03/03/2020     Lab Results   Component Value Date    MCH 29.2 05/30/2023    MCH 28.7 03/03/2020     Lab Results   Component Value Date    MCHC 32.4 05/30/2023    MCHC 32.2 03/03/2020     Lab Results   Component Value Date    RDW 14.2 05/30/2023    RDW 14.7 03/03/2020     Lab Results   Component Value Date     05/30/2023     03/03/2020

## 2023-06-03 NOTE — PLAN OF CARE
Occupational Therapy Discharge Summary    Reason for therapy discharge:    Discharged to long term care facility.    Progress towards therapy goal(s). See goals on Care Plan in [facility] electronic health record for goal details.  Goals partially met.  Barriers to achieving goals:   discharge from facility.    Therapy recommendation(s):    Continued therapy is recommended.  Rationale/Recommendations:  Pt may benefit from HH OT to promote participation in ADL following injury.  Continue home exercise program.

## 2023-06-04 NOTE — DISCHARGE SUMMARY
ORTHOPAEDIC SURGERY DISCHARGE SUMMARY     Date of Admission: 5/31/2023  Date of Discharge: 6/2/2023  3:30 PM  Disposition: Memory care facility  Staff Physician: Kristina Montes MD  Primary Care Provider: Coco Mejia      ADMISSION DIAGNOSIS:  Left elbow fracture-dislocation    DISCHARGE DIAGNOSIS:  Left elbow fracture-dislocation    PROCEDURES:  None    BRIEF HISTORY:  Gabriela Agrawal is a 89 year old female with PMH significant for dementia and HTN who transferred to [facility] ED from [facility] with left elbow fracture dislocation. Skin intact on exam. NVI on exam.      Patient's fracture pattern alongside patient's medical status makes it difficult for her to proceed forward with surgical interventions that would provide significant improvement.  At this time patient is thought to be best served with continuing immobilization and attempting to allow fracture to consolidate in his current position.  At future time further intervention can be considered.     Dr. Montes ([facility] shoulder/upper extremity) has conversed with other upper extremity specialists and feel that there are no good surgical options and agree with current nonoperative management.    HOSPITAL COURSE:    The patient was admitted following the above for pain control and rehabilitation. Gabriela Agrawal did well. Medicine was consulted post operatively to aid in management of medical co-morbidities. The patient received routine nursing cares and at the time of discharge was medically stable. Vital signs were stable throughout admission. The patient is tolerating a regular diet and is voiding spontaneously. All PT/OT goals have been met for safe mobility. Pain is now controlled on oral medications which will be available on discharge. Stool softeners have been used while taking pain medications to help prevent constipation. Gabriela Agrawal is deemed medically safe to discharge on hospital day 3    Antibiotics:  None  DVT prophylaxis:  Per  primary  PT Progress:  Has met PT/OT goals for safe mobility.    Pain Meds:  Weaned off all IV pain meds by discharge.  Inpatient Events: No significant events or complications.     PHYSICAL EXAM:    Gen: No acute distress, resting comfortably in bed.  Resp: Non-labored breathing  Cardio: Regular rate via peripheral pulse  MSK:  LUE:  - Splint/dressing c/d/I  - Fires EPL, FPL, Intrinsics  - SILT median/radial/ulnar nerves  - Hand wwp    FOLLOWUP:    Follow up with Dr. Hassan on 6/14/2023    Future Appointments   Date Time Provider Department Center   6/14/2023  8:10 AM Wade Hassan MD [facility]       Orthopaedic Surgery appointments are at the [facility] ([address] [place] [address]). Call [phone] to schedule a follow-up appointment at this location with your provider.     PLANNED DISCHARGE ORDERS:     DVT Prophylaxis: Per primary     Activity: NWB LUE in splint, sling for comfort     Wound Care: see Below      Discharge Medication List as of 6/2/2023  3:20 PM      CONTINUE these medications which have NOT CHANGED    Details   acetaminophen (TYLENOL) 500 MG tablet Take 1,000 mg by mouth 2 times daily, Historical      calcium citrate-vitamin D (CITRACAL) 315-5 MG-MCG TABS per tablet Take 2 tablets by mouth daily, Historical      citalopram (CELEXA) 20 MG tablet Take 20 mg by mouth every evening, Historical      donepezil (ARICEPT) 10 MG tablet Take 1 tablet (10 mg) by mouth At Bedtime, Disp-90 tablet, R-3, E-PrescribePatient will call to fill      losartan (COZAAR) 50 MG tablet Take 50 mg by mouth daily, Historical      Multiple Vitamins-Minerals (CEROVITE SENIOR) TABS Take 1 tablet by mouth daily, Historical      Vitamin D3 (CHOLECALCIFEROL) 25 mcg (1000 units) tablet Take 1 tablet by mouth daily , Disp-100 tablet, R-12, Historical      vitamin E (TOCOPHEROL) 1000 units (900 mg) capsule Take 2 capsules (2,000 Units) by mouth daily, Disp-180 capsule, R-3,  E-Prescribe               Discharge Procedure Orders   Home Care Referral   Referral Priority: Routine: Next available opening Referral Type: Home Health Therapies & Aides   Number of Visits Requested: 1     Reason for your hospital stay   Order Comments: Fracture dislocation of the left elbow     Activity   Order Comments: Your activity upon discharge: activity as tolerated     Order Specific Question Answer Comments   Is discharge order? Yes      Adult [facility] Follow-up and recommended labs and tests   Order Comments: Follow up with primary care provider, Coco Mejia, within 7 days for hospital follow- up.  No follow up labs or test are needed.      Appointments on [facility] and/or [facility] (with [facility] or [facility] provider or service). Call [phone] if you haven't heard regarding these appointments within 7 days of discharge.     Follow Up and recommended labs and tests   Order Comments: Follow up with Dr. Nj in 1-2 weeks     Wound care and dressings   Order Comments: Sling on left upper extremity to remain in place as much as tolerated. No weight bearing on left upper extremity. Sling is for comfort. Keep sling clean and dry. To be reassess as patient's follow up appointment.     Cane Order   Order Comments: I, the undersigned, certify that the above prescribed supplies are medically necessary for this patient and is both reasonable and necessary in reference to accepted standards of medical and necessary in reference to accepted standards of medical practice in the treatment of this patient's condition and is not prescribed as a convenience.      Order Specific Question Answer Comments   DME Provider: [facility]    Start Date: 6/2/2023    Cane Type: Single Tip    Diagnosis: R26.89 - Impaired Gait and Mobility    Reminder: Patient can typically get 1 every 5 years      Diet   Order Comments: Follow this diet upon discharge: Orders Placed This Encounter      Regular Diet Adult     Order  Specific Question Answer Comments   Is discharge order? Yes        Kip Trammell MD  Orthopaedic Surgery PGY-4  [phone]

## 2023-06-05 ENCOUNTER — TELEPHONE (OUTPATIENT)
Dept: FAMILY MEDICINE | Facility: CLINIC | Age: 88
End: 2023-06-05
Payer: COMMERCIAL

## 2023-06-05 NOTE — TELEPHONE ENCOUNTER
Yessy from [facility] home care called for Verbal order given for Skilled nurse visit one time week for 2 weeks then every other week for 6 weeks mireya Arroyo RN

## 2023-06-05 NOTE — PROGRESS NOTES
----- Message from GLORIA Mi sent at 2/14/2023 12:24 PM CST -----  Hep c negative  CBC normal no anemia or concerns Care Management Discharge Note    Discharge Date: 06/02/2023       Discharge Disposition:  Home   [facility] [place]  [facility]  [address]   [address]   [facility] [address]    Discharge Services:  Home Care, skilled nursing, PT and OT    [facility]  [address]   [address]  [place] [address]  Phone [phone]  Fax [phone]    Discharge DME:  NA    Discharge Transportation:  [facility], Stretcher    Private pay costs discussed: transportation costs    Does the patient's insurance plan have a 3 day qualifying hospital stay waiver?  No    PAS Confirmation Code:  NA  Patient/family educated on Medicare website which has current facility and service quality ratings:  NA    Education Provided on the Discharge Plan:  Yes  Persons Notified of Discharge Plans: Patient's sonKendall  Patient/Family in Agreement with the Plan:  tobias    Handoff Referral Completed: No    Additional Information:  Spoke with patient's sonKendall regarding discharge back to patient's memory care, [facility].Plan for home care with [facility], skilled nursing, PT and OT. Stretcher transport with [facility]. Discussed possible out of pocket expense for stretcher transport with Kendall and he stated his mom has money to cover the transport if needed. Discharge orders faxed to [facility] [place]. No further discharge concerns. RNCC available as needed.    Mari Patel RN, BSN  Care Coordinator, 5 Ortho  Phone [phone]  Pager [phone]

## 2023-06-08 ENCOUNTER — TELEPHONE (OUTPATIENT)
Dept: FAMILY MEDICINE | Facility: CLINIC | Age: 88
End: 2023-06-08
Payer: COMMERCIAL

## 2023-06-08 DIAGNOSIS — M25.522 ELBOW PAIN, LEFT: Primary | ICD-10-CM

## 2023-06-08 NOTE — TELEPHONE ENCOUNTER
Home Health Care    Reason for call: Angely from [facility]    Orders are needed for this patient.  OT: 1 x a week for 2   1 x a week for 4 weeks    transfers working with her left arm  Pt Provider: sadie    Phone Number Homecare Nurse can be reached at: [phone]    Can we leave a detailed message on this number? YES

## 2023-06-09 NOTE — TELEPHONE ENCOUNTER
Angely OT with [facility] Home care called for authorization of home care orders for OT 1x week for 2 weeks, then 1x week for 4 weeks, for transfers and working with her L arm. Message forwarded to PCP for authorization per home care protocol. Char Vogt RN

## 2023-06-12 ENCOUNTER — TRANSFERRED RECORDS (OUTPATIENT)
Dept: HEALTH INFORMATION MANAGEMENT | Facility: CLINIC | Age: 88
End: 2023-06-12
Payer: COMMERCIAL

## 2023-06-13 ENCOUNTER — TELEPHONE (OUTPATIENT)
Dept: FAMILY MEDICINE | Facility: CLINIC | Age: 88
End: 2023-06-13
Payer: COMMERCIAL

## 2023-06-13 NOTE — TELEPHONE ENCOUNTER
Home Health Care    Reason for call:  mari rn with [facility] called stating that patient had a fall resulting in a left humerus and elbow fracture 5/30. Mari reports patient was taking scheduled oxycodone Hcl 5 mg 3 x a day  for pain every 6 hours. Patient is also taking tylenol twice daily. RN feels she needs something more for pain.     Patient was in a tcu moved to [facility].      Pharmacy: [facility]    [place] [facility] [place]  Ph. [phone].      Pt Provider: sadei    Phone Number Homecare Nurse can be reached at: [phone]      Can we leave a detailed message on this number? YES      Could we send this information to you in [employer] or would you prefer to receive a phone call?:   Patient would prefer a phone call   Okay to leave a detailed message?: Yes at Home number on file [phone] (home)

## 2023-06-13 NOTE — TELEPHONE ENCOUNTER
Angely OT with [facility] care is reached and given the message that Dr. Mejia agrees with plan for homecare. Char ALDANA RN

## 2023-06-13 NOTE — TELEPHONE ENCOUNTER
I have not seen patient since hospital stay, so difficult for me to recommend another pain regimen.  Is she seeing Orthopedics?  Otherwise, recommend face to face visit with me this week, or another provider if I do not have same day appointment open

## 2023-06-20 DIAGNOSIS — Z53.9 DIAGNOSIS NOT YET DEFINED: Primary | ICD-10-CM

## 2023-06-20 PROCEDURE — G0180 MD CERTIFICATION HHA PATIENT: HCPCS | Performed by: INTERNAL MEDICINE

## 2023-10-15 ENCOUNTER — APPOINTMENT (OUTPATIENT)
Dept: GENERAL RADIOLOGY | Facility: CLINIC | Age: 88
End: 2023-10-15
Attending: EMERGENCY MEDICINE
Payer: COMMERCIAL

## 2023-10-15 ENCOUNTER — HOSPITAL ENCOUNTER (EMERGENCY)
Facility: CLINIC | Age: 88
Discharge: HOME | End: 2023-10-15
Attending: EMERGENCY MEDICINE | Admitting: EMERGENCY MEDICINE
Payer: COMMERCIAL

## 2023-10-15 VITALS
DIASTOLIC BLOOD PRESSURE: 83 MMHG | TEMPERATURE: 98.5 F | HEIGHT: 67 IN | WEIGHT: 161 LBS | OXYGEN SATURATION: 95 % | BODY MASS INDEX: 25.27 KG/M2 | SYSTOLIC BLOOD PRESSURE: 119 MMHG | RESPIRATION RATE: 16 BRPM | HEART RATE: 79 BPM
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
## 2023-10-15 DIAGNOSIS — M25.522 LEFT ELBOW PAIN: ICD-10-CM

## 2023-10-15 PROCEDURE — 71045 X-RAY EXAM CHEST 1 VIEW: CPT

## 2023-10-15 PROCEDURE — 99284 EMERGENCY DEPT VISIT MOD MDM: CPT | Performed by: EMERGENCY MEDICINE

## 2023-10-15 PROCEDURE — 99284 EMERGENCY DEPT VISIT MOD MDM: CPT | Mod: 25 | Performed by: EMERGENCY MEDICINE

## 2023-10-15 PROCEDURE — 73070 X-RAY EXAM OF ELBOW: CPT | Mod: LT

## 2023-10-15 PROCEDURE — 250N000013 HC RX MED GY IP 250 OP 250 PS 637: Performed by: EMERGENCY MEDICINE

## 2023-10-15 PROCEDURE — 73030 X-RAY EXAM OF SHOULDER: CPT | Mod: LT

## 2023-10-15 RX ORDER — ACETAMINOPHEN 500 MG
1000 TABLET ORAL ONCE
Status: COMPLETED | OUTPATIENT
Start: 2023-10-15 | End: 2023-10-15

## 2023-10-15 RX ADMIN — ACETAMINOPHEN 1000 MG: 500 TABLET, FILM COATED ORAL at 22:39

## 2023-10-15 ASSESSMENT — ACTIVITIES OF DAILY LIVING (ADL)
ADLS_ACUITY_SCORE: 35
ADLS_ACUITY_SCORE: 35

## 2023-10-16 NOTE — ED PROVIDER NOTES
History     Chief Complaint   Patient presents with    Fall     HPI  Gabriela Agrawal is a 89 year old female who has a history of dementia and presents for evaluation after being found down.  She was apparently complaining of pain in the hips prior to being transported.  Upon arrival here the patient is denying any complaints.  She is pleasant and is not able to provide any further history.  I did speak on the phone with the patient's son who says that the patient is significantly demented and will not be able to provide any history and this is her baseline.  He reports that she had a significant elbow injury several months ago that was treated nonoperatively.  We discussed whether advanced imaging would be beneficial for her such as CT head or cervical spine.  After discussion of the patient's goals of care it was decided not obtain CT imaging as it would be unlikely we would do anything different besides pain control or symptom management if these are positive.    I reviewed the patient's clinic visit with [facility] orthopedics on 6/12/2023, follow-up of her elbow fracture, they were planning on taking her out of her splint in 4 weeks if the x-ray was stable.  I reviewed her emergency department visit from 5/30/2023 with an she initially fractured her elbow and reviewed the images from this, significantly displaced elbow fracture dislocation.    Allergies:  Allergies   Allergen Reactions    Nkda [No Known Drug Allergy]        Problem List:    Patient Active Problem List    Diagnosis Date Noted    Closed displaced fracture of lateral condyle of left humerus, initial encounter 06/01/2023     Priority: Medium    Accidental fall, initial encounter 06/01/2023     Priority: Medium    Closed displaced fracture of lateral epicondyle of left humerus, unspecified fracture morphology, initial encounter 06/01/2023     Priority: Medium    Closed fracture dislocation of left elbow, initial encounter 05/31/2023     Priority:  Medium    Senile dementia without behavioral disturbance (H) 06/02/2020     Priority: Medium     Neuropsych testing 6/2020.        Elevated fasting blood sugar 01/07/2019     Priority: Medium    Grief reaction 12/11/2015     Priority: Medium    Serum calcium elevated 10/07/2015     Priority: Medium    Osteopenia of multiple sites 10/06/2015     Priority: Medium     On calcium and vitamin D    Dexa 2015 - 1. The T-score of the lumbar spine in the region of L1-L4 is -0.5.  This correlates with normal bone mineral density. If one looks at the  L2 vertebral body alone the T score is -1.5 which correlates with  mild-moderate osteopenia.     2. The T-score of the right femoral neck is -2.4. This correlates with  severe osteopenia.     3. The T-score of the left femoral neck is -2.3. This correlates with  severe osteopenia.      Essential hypertension, benign 10/02/2015     Priority: Medium    History of skin cancer 01/29/2013     Priority: Medium    Seborrheic keratosis 01/29/2013     Priority: Medium    Lentigo 01/29/2013     Priority: Medium    Angioma 01/29/2013     Priority: Medium    Dermal nevus 01/29/2013     Priority: Medium    AK (actinic keratosis) 01/29/2013     Priority: Medium    Hyperlipidemia LDL goal <130 10/17/2012     Priority: Medium     Stopped statin 6/9/2020 due to advanced age      Basal cell carcinoma, lip 09/24/2012     Priority: Medium    Squamous cell carcinoma in situ 09/24/2012     Priority: Medium    History of melanoma 09/24/2012     Priority: Medium    Melanoma (H) 02/10/2011     Priority: Medium     Seeing oncologist            Past Medical History:    Past Medical History:   Diagnosis Date    Basal cell carcinoma     Bronchiolitis obliterans (H)     Diffuse cystic mastopathy     Disorder of bone and cartilage, unspecified     Malignant melanoma (H)     Nonspecific (abnormal) findings on radiological and other examination of lung field     Other abnormal blood chemistry     Other specified  "alveolar and parietoalveolar pneumonopathies     Squamous cell carcinoma     Syncope and collapse        Past Surgical History:    Past Surgical History:   Procedure Laterality Date    APPENDECTOMY OPEN      BREAST SURGERY      biopsy    COLONOSCOPY  2013    Procedure: COLONOSCOPY;  Colonoscopy  ;  Surgeon: Norah Whaley MD;  Location: [facility]    EYE SURGERY      keratoplasty    HERNIA REPAIR      left inginal    HYSTERECTOMY      including ovaries    KNEE SURGERY      ,     LUNG SURGERY      biopsy    melanoma  resection         Family History:    Family History   Problem Relation Age of Onset    Cancer Other     Family History Negative Other        Social History:  Marital Status:   [5]  Social History     Tobacco Use    Smoking status: Former     Packs/day: 1.50     Years: 22.50     Additional pack years: 0.00     Total pack years: 33.75     Types: Cigarettes     Quit date: 1982     Years since quittin.8    Smokeless tobacco: Never   Substance Use Topics    Alcohol use: Yes     Comment: 4-6 a week    Drug use: No        Medications:    acetaminophen (TYLENOL) 500 MG tablet  calcium citrate-vitamin D (CITRACAL) 315-5 MG-MCG TABS per tablet  citalopram (CELEXA) 20 MG tablet  donepezil (ARICEPT) 10 MG tablet  losartan (COZAAR) 50 MG tablet  Multiple Vitamins-Minerals (CEROVITE SENIOR) TABS  Vitamin D3 (CHOLECALCIFEROL) 25 mcg (1000 units) tablet  vitamin E (TOCOPHEROL) 1000 units (900 mg) capsule          Review of Systems    Physical Exam   BP: (!) 153/70  Pulse: 80  Temp: 98.5  F (36.9  C)  Resp: 16  Height: 170.2 cm (5' 7\")  Weight: 73 kg (161 lb)  SpO2: 94 %      Physical Exam  BP (!) 153/70   Pulse 80   Temp 98.5  F (36.9  C) (Oral)   Resp 16   Ht 1.702 m (5' 7\")   Wt 73 kg (161 lb)   SpO2 94%   BMI 25.22 kg/m     GENERAL: Alert, cooperative, no distress  HEAD: No scalp laceration, hematoma, or abrasion.  No tenderness.  EYES: Conjunctivae clear, EOM intact. PERLL, " Pupils are 3 mm.  HEENT:  Normal external ears.  No nasal discharge or evidence of septal hematoma. No facial instability or asymmetry. No evidence of intraoral trauma.  Intact bite without malalignment.  NECK: No midline tenderness, no lateral tenderness.  CHEST:  No crepitus or tenderness with AP or lateral compression  CARDIOVASCULAR : Nml rate, distal pulses are normal and symmetric  LUNGS: No respiratory distress.  GI: Soft, ND, NT.   PELVIS: No crepitus or tenderness with AP or lateral compression  EXTREMITIES: Deformity of the left wrist, nontender.  Swelling and deformity with crepitus and tenderness of the left elbow.  Tenderness to the left shoulder.  No tenderness and normal range of motion's of the right upper extremity and bilateral lower extremities.  NEUROLOGICAL : Awake, alert, and demented.  Pleasant, moving all hour extremities.   SKIN : Normal color, no jaundice or rash. No abrasions.    ED Course                 Procedures              Critical Care time:  none               Results for orders placed or performed during the hospital encounter of 10/15/23 (from the past 24 hour(s))   XR Chest 1 View    Narrative    EXAM: XR CHEST 1 VIEW  LOCATION: [facility]  DATE: 10/15/2023    INDICATION: left sided chest pain, found down  COMPARISON: 04/13/2021      Impression    IMPRESSION: Stable cardiomediastinal silhouette. Postsurgical change left lung. Atherosclerotic aorta. No focal consolidation or pleural effusion. Mild basilar fibroatelectasis. Nodular density left lower lung unchanged.   XR Shoulder Left 2 Views    Narrative    EXAM: XR SHOULDER LEFT 2 VIEWS  LOCATION: [facility]  DATE: 10/15/2023    INDICATION: shoulder pain after a fall  COMPARISON: 05/30/2023      Impression    IMPRESSION: Degenerative change. No visible fracture or dislocation. Postsurgical change left lung.   Elbow XR, 2 view, left    Narrative    EXAM: XR ELBOW LEFT 2  VIEWS  LOCATION: [facility]  DATE: 10/15/2023    INDICATION: Elbow pain after a fall.  COMPARISON:   1. X-ray left elbow 2 views 05/31/2023 at 0915 hours.  2. CT left elbow without IV contrast 05/31/2023.      Impression    IMPRESSION: Previously seen cast material has been removed. Considerably comminuted fracture dislocation involving the distal left humeral articular surface. Multiple osseous fragments medially with slight periosteal reaction. Moderate joint effusion.   Associated soft tissue swelling and deformity.       Medications   acetaminophen (TYLENOL) tablet 1,000 mg (1,000 mg Oral $Given 10/15/23 2237)       Assessments & Plan (with Medical Decision Making)   89-year-old female presents for evaluation after being found down at her memory care unit.  Multiple images obtained.  All images independently interpreted, I also read the radiology reads.  No signs of rib fracture or pneumothorax or hemothorax.  No signs of shoulder fracture.  She does have comminuted displaced fracture of the left elbow that looks similar to prior.  She is given acetaminophen for pain.  On recheck she is still using that elbow, using it to hold the papers and to feed herself.  I suspect that this is her old fracture, unchanged, no new injury.  I believe given her significant dementia and age it is likely better to keep the elbow unsplinted at this time and allow her to use it as she is able to tolerate.  It has been decided that she will be treated nonoperatively already and I do not believe that surgery is a beneficial thing for this patient given her age and comorbidities.  She will be discharged back to her memory care unit with instructions return if worse, otherwise follow-up in clinic.     I have reviewed the nursing notes.    I have reviewed the findings, diagnosis, plan and need for follow up with the patient.           New Prescriptions    No medications on file       Final diagnoses:   Fall,  initial encounter   Left elbow pain       10/15/2023   [facility] EMERGENCY DEPT       Moiz Fitzpatrick MD  10/15/23 [id number]

## 2023-10-16 NOTE — ED TRIAGE NOTES
Patient had unwitnessed fall at memory care unit. Staff heard patient calling out for help. Patient has hx of dementia alert to self only. Unable to specify location of pain.     Triage Assessment (Adult)       Row Name 10/15/23 2047          Triage Assessment    Airway WDL WDL        Respiratory WDL    Respiratory WDL WDL        Skin Circulation/Temperature WDL    Skin Circulation/Temperature WDL WDL        Cardiac WDL    Cardiac WDL WDL        Peripheral/Neurovascular WDL    Peripheral Neurovascular WDL WDL        Cognitive/Neuro/Behavioral WDL    Cognitive/Neuro/Behavioral WDL WDL

## 2023-10-16 NOTE — ED NOTES
Bed: ED02  Expected date:   Expected time:   Means of arrival:   Comments:  EMS   Problem: Labor (Cervical Ripen, Induct, Augment) (Adult,Obstetrics,Pediatric)  Intervention: Monitor/Manage Labor Pain  1530. To tub. Dr johnson called for statis update. 1545 Pt requesting pain meds. Dr Johnson called. Order obtained. 1555 Assisted out of tub to left side in bed. Oxygen donned. Medicated.

## 2023-10-16 NOTE — DISCHARGE INSTRUCTIONS
Continue home medications.  Return for worsening symptoms or concerns.  Otherwise follow-up in clinic.

## 2023-10-17 ENCOUNTER — LAB REQUISITION (OUTPATIENT)
Dept: LAB | Facility: CLINIC | Age: 88
End: 2023-10-17
Payer: COMMERCIAL

## 2023-10-17 DIAGNOSIS — F33.9 MAJOR DEPRESSIVE DISORDER, RECURRENT, UNSPECIFIED (H): ICD-10-CM

## 2023-10-17 DIAGNOSIS — I10 ESSENTIAL (PRIMARY) HYPERTENSION: ICD-10-CM

## 2023-10-17 DIAGNOSIS — F03.90 UNSPECIFIED DEMENTIA, UNSPECIFIED SEVERITY, WITHOUT BEHAVIORAL DISTURBANCE, PSYCHOTIC DISTURBANCE, MOOD DISTURBANCE, AND ANXIETY (H): ICD-10-CM

## 2023-10-18 LAB
ANION GAP SERPL CALCULATED.3IONS-SCNC: 11 MMOL/L (ref 7–15)
BUN SERPL-MCNC: 22.7 MG/DL (ref 8–23)
CALCIUM SERPL-MCNC: 9.2 MG/DL (ref 8.2–9.6)
CHLORIDE SERPL-SCNC: 102 MMOL/L (ref 98–107)
CREAT SERPL-MCNC: 0.76 MG/DL (ref 0.51–0.95)
DEPRECATED HCO3 PLAS-SCNC: 25 MMOL/L (ref 22–29)
EGFRCR SERPLBLD CKD-EPI 2021: 74 ML/MIN/1.73M2
ERYTHROCYTE [DISTWIDTH] IN BLOOD BY AUTOMATED COUNT: 14.3 % (ref 10–15)
GLUCOSE SERPL-MCNC: 79 MG/DL (ref 70–99)
HCT VFR BLD AUTO: 35.5 % (ref 35–47)
HGB BLD-MCNC: 11 G/DL (ref 11.7–15.7)
MCH RBC QN AUTO: 26.5 PG (ref 26.5–33)
MCHC RBC AUTO-ENTMCNC: 31 G/DL (ref 31.5–36.5)
MCV RBC AUTO: 86 FL (ref 78–100)
PLATELET # BLD AUTO: 307 10E3/UL (ref 150–450)
POTASSIUM SERPL-SCNC: 4.4 MMOL/L (ref 3.4–5.3)
RBC # BLD AUTO: 4.15 10E6/UL (ref 3.8–5.2)
SODIUM SERPL-SCNC: 138 MMOL/L (ref 135–145)
TSH SERPL DL<=0.005 MIU/L-ACNC: 3.46 UIU/ML (ref 0.3–4.2)
WBC # BLD AUTO: 7.4 10E3/UL (ref 4–11)

## 2023-10-18 PROCEDURE — 84443 ASSAY THYROID STIM HORMONE: CPT | Mod: ORL

## 2023-10-18 PROCEDURE — 80048 BASIC METABOLIC PNL TOTAL CA: CPT | Mod: ORL

## 2023-10-18 PROCEDURE — 36415 COLL VENOUS BLD VENIPUNCTURE: CPT | Mod: ORL

## 2023-10-18 PROCEDURE — 85027 COMPLETE CBC AUTOMATED: CPT | Mod: ORL

## 2023-10-18 PROCEDURE — P9603 ONE-WAY ALLOW PRORATED MILES: HCPCS | Mod: ORL

## 2023-12-03 ENCOUNTER — HEALTH MAINTENANCE LETTER (OUTPATIENT)
Age: 88
End: 2023-12-03

## 2024-04-02 ENCOUNTER — LAB REQUISITION (OUTPATIENT)
Dept: LAB | Facility: CLINIC | Age: 89
End: 2024-04-02
Payer: COMMERCIAL

## 2024-04-02 DIAGNOSIS — F03.90 UNSPECIFIED DEMENTIA, UNSPECIFIED SEVERITY, WITHOUT BEHAVIORAL DISTURBANCE, PSYCHOTIC DISTURBANCE, MOOD DISTURBANCE, AND ANXIETY (H): ICD-10-CM

## 2024-04-02 DIAGNOSIS — I10 ESSENTIAL (PRIMARY) HYPERTENSION: ICD-10-CM

## 2024-04-02 DIAGNOSIS — E55.9 VITAMIN D DEFICIENCY, UNSPECIFIED: ICD-10-CM

## 2024-04-03 LAB
ALBUMIN SERPL BCG-MCNC: 3.6 G/DL (ref 3.5–5.2)
ALP SERPL-CCNC: 69 U/L (ref 40–150)
ALT SERPL W P-5'-P-CCNC: <5 U/L (ref 0–50)
ANION GAP SERPL CALCULATED.3IONS-SCNC: 10 MMOL/L (ref 7–15)
AST SERPL W P-5'-P-CCNC: 10 U/L (ref 0–45)
BILIRUB SERPL-MCNC: 0.2 MG/DL
BUN SERPL-MCNC: 19.4 MG/DL (ref 8–23)
CALCIUM SERPL-MCNC: 8.9 MG/DL (ref 8.2–9.6)
CHLORIDE SERPL-SCNC: 104 MMOL/L (ref 98–107)
CREAT SERPL-MCNC: 0.69 MG/DL (ref 0.51–0.95)
DEPRECATED HCO3 PLAS-SCNC: 27 MMOL/L (ref 22–29)
EGFRCR SERPLBLD CKD-EPI 2021: 82 ML/MIN/1.73M2
ERYTHROCYTE [DISTWIDTH] IN BLOOD BY AUTOMATED COUNT: 14.3 % (ref 10–15)
GLUCOSE SERPL-MCNC: 78 MG/DL (ref 70–99)
HCT VFR BLD AUTO: 39.3 % (ref 35–47)
HGB BLD-MCNC: 11.9 G/DL (ref 11.7–15.7)
MCH RBC QN AUTO: 27 PG (ref 26.5–33)
MCHC RBC AUTO-ENTMCNC: 30.3 G/DL (ref 31.5–36.5)
MCV RBC AUTO: 89 FL (ref 78–100)
PLATELET # BLD AUTO: 278 10E3/UL (ref 150–450)
POTASSIUM SERPL-SCNC: 4.4 MMOL/L (ref 3.4–5.3)
PROT SERPL-MCNC: 6.1 G/DL (ref 6.4–8.3)
RBC # BLD AUTO: 4.41 10E6/UL (ref 3.8–5.2)
SODIUM SERPL-SCNC: 141 MMOL/L (ref 135–145)
TSH SERPL DL<=0.005 MIU/L-ACNC: 2.64 UIU/ML (ref 0.3–4.2)
VIT D+METAB SERPL-MCNC: 48 NG/ML (ref 20–50)
WBC # BLD AUTO: 9.3 10E3/UL (ref 4–11)

## 2024-04-03 PROCEDURE — 85027 COMPLETE CBC AUTOMATED: CPT | Mod: ORL | Performed by: PHYSICIAN ASSISTANT

## 2024-04-03 PROCEDURE — 80053 COMPREHEN METABOLIC PANEL: CPT | Mod: ORL | Performed by: PHYSICIAN ASSISTANT

## 2024-04-03 PROCEDURE — 36415 COLL VENOUS BLD VENIPUNCTURE: CPT | Mod: ORL | Performed by: PHYSICIAN ASSISTANT

## 2024-04-03 PROCEDURE — P9604 ONE-WAY ALLOW PRORATED TRIP: HCPCS | Mod: ORL | Performed by: PHYSICIAN ASSISTANT

## 2024-04-03 PROCEDURE — 84443 ASSAY THYROID STIM HORMONE: CPT | Mod: ORL | Performed by: PHYSICIAN ASSISTANT

## 2024-04-03 PROCEDURE — 82306 VITAMIN D 25 HYDROXY: CPT | Mod: ORL | Performed by: PHYSICIAN ASSISTANT

## 2024-05-12 ENCOUNTER — LAB REQUISITION (OUTPATIENT)
Dept: LAB | Facility: CLINIC | Age: 89
End: 2024-05-12
Payer: COMMERCIAL

## 2024-05-12 DIAGNOSIS — I10 ESSENTIAL (PRIMARY) HYPERTENSION: ICD-10-CM

## 2024-05-12 DIAGNOSIS — F03.90 UNSPECIFIED DEMENTIA, UNSPECIFIED SEVERITY, WITHOUT BEHAVIORAL DISTURBANCE, PSYCHOTIC DISTURBANCE, MOOD DISTURBANCE, AND ANXIETY (H): ICD-10-CM

## 2024-05-12 DIAGNOSIS — E55.9 VITAMIN D DEFICIENCY, UNSPECIFIED: ICD-10-CM

## 2024-05-12 DIAGNOSIS — E46 UNSPECIFIED PROTEIN-CALORIE MALNUTRITION (H): ICD-10-CM

## 2024-05-13 LAB
ALBUMIN SERPL BCG-MCNC: 4.1 G/DL (ref 3.5–5.2)
ALP SERPL-CCNC: 69 U/L (ref 40–150)
ALT SERPL W P-5'-P-CCNC: <5 U/L (ref 0–50)
ANION GAP SERPL CALCULATED.3IONS-SCNC: 8 MMOL/L (ref 7–15)
AST SERPL W P-5'-P-CCNC: 15 U/L (ref 0–45)
BILIRUB SERPL-MCNC: 0.2 MG/DL
BUN SERPL-MCNC: 21.8 MG/DL (ref 8–23)
CALCIUM SERPL-MCNC: 9.7 MG/DL (ref 8.2–9.6)
CHLORIDE SERPL-SCNC: 104 MMOL/L (ref 98–107)
CREAT SERPL-MCNC: 0.72 MG/DL (ref 0.51–0.95)
DEPRECATED HCO3 PLAS-SCNC: 28 MMOL/L (ref 22–29)
EGFRCR SERPLBLD CKD-EPI 2021: 79 ML/MIN/1.73M2
ERYTHROCYTE [DISTWIDTH] IN BLOOD BY AUTOMATED COUNT: 15.3 % (ref 10–15)
GLUCOSE SERPL-MCNC: 85 MG/DL (ref 70–99)
HCT VFR BLD AUTO: 42.7 % (ref 35–47)
HGB BLD-MCNC: 13 G/DL (ref 11.7–15.7)
MCH RBC QN AUTO: 26.9 PG (ref 26.5–33)
MCHC RBC AUTO-ENTMCNC: 30.4 G/DL (ref 31.5–36.5)
MCV RBC AUTO: 88 FL (ref 78–100)
PLATELET # BLD AUTO: 622 10E3/UL (ref 150–450)
POTASSIUM SERPL-SCNC: 3.9 MMOL/L (ref 3.4–5.3)
PROT SERPL-MCNC: 6.9 G/DL (ref 6.4–8.3)
RBC # BLD AUTO: 4.84 10E6/UL (ref 3.8–5.2)
SODIUM SERPL-SCNC: 140 MMOL/L (ref 135–145)
TSH SERPL DL<=0.005 MIU/L-ACNC: 2.41 UIU/ML (ref 0.3–4.2)
VIT B12 SERPL-MCNC: 303 PG/ML (ref 232–1245)
VIT D+METAB SERPL-MCNC: 47 NG/ML (ref 20–50)
WBC # BLD AUTO: 7.4 10E3/UL (ref 4–11)

## 2024-05-13 PROCEDURE — 82607 VITAMIN B-12: CPT | Mod: ORL | Performed by: PHYSICIAN ASSISTANT

## 2024-05-13 PROCEDURE — 84443 ASSAY THYROID STIM HORMONE: CPT | Mod: ORL | Performed by: PHYSICIAN ASSISTANT

## 2024-05-13 PROCEDURE — 82306 VITAMIN D 25 HYDROXY: CPT | Mod: ORL | Performed by: PHYSICIAN ASSISTANT

## 2024-05-13 PROCEDURE — P9603 ONE-WAY ALLOW PRORATED MILES: HCPCS | Mod: ORL | Performed by: PHYSICIAN ASSISTANT

## 2024-05-13 PROCEDURE — 80053 COMPREHEN METABOLIC PANEL: CPT | Mod: ORL | Performed by: PHYSICIAN ASSISTANT

## 2024-05-13 PROCEDURE — 36415 COLL VENOUS BLD VENIPUNCTURE: CPT | Mod: ORL | Performed by: PHYSICIAN ASSISTANT

## 2024-05-13 PROCEDURE — 85027 COMPLETE CBC AUTOMATED: CPT | Mod: ORL | Performed by: PHYSICIAN ASSISTANT

## 2024-07-02 ENCOUNTER — LAB REQUISITION (OUTPATIENT)
Dept: LAB | Facility: CLINIC | Age: 89
End: 2024-07-02
Payer: COMMERCIAL

## 2024-07-02 DIAGNOSIS — I10 ESSENTIAL (PRIMARY) HYPERTENSION: ICD-10-CM

## 2024-07-02 DIAGNOSIS — F03.90 UNSPECIFIED DEMENTIA, UNSPECIFIED SEVERITY, WITHOUT BEHAVIORAL DISTURBANCE, PSYCHOTIC DISTURBANCE, MOOD DISTURBANCE, AND ANXIETY (H): ICD-10-CM

## 2024-07-02 DIAGNOSIS — E46 UNSPECIFIED PROTEIN-CALORIE MALNUTRITION (H): ICD-10-CM

## 2024-07-02 DIAGNOSIS — E55.9 VITAMIN D DEFICIENCY, UNSPECIFIED: ICD-10-CM

## 2024-07-03 LAB
ALBUMIN SERPL BCG-MCNC: 3.9 G/DL (ref 3.5–5.2)
ALP SERPL-CCNC: 69 U/L (ref 40–150)
ALT SERPL W P-5'-P-CCNC: 7 U/L (ref 0–50)
ANION GAP SERPL CALCULATED.3IONS-SCNC: 9 MMOL/L (ref 7–15)
AST SERPL W P-5'-P-CCNC: 15 U/L (ref 0–45)
BILIRUB SERPL-MCNC: 0.2 MG/DL
BUN SERPL-MCNC: 23.1 MG/DL (ref 8–23)
CALCIUM SERPL-MCNC: 9.5 MG/DL (ref 8.2–9.6)
CHLORIDE SERPL-SCNC: 103 MMOL/L (ref 98–107)
CREAT SERPL-MCNC: 0.73 MG/DL (ref 0.51–0.95)
DEPRECATED HCO3 PLAS-SCNC: 25 MMOL/L (ref 22–29)
EGFRCR SERPLBLD CKD-EPI 2021: 78 ML/MIN/1.73M2
ERYTHROCYTE [DISTWIDTH] IN BLOOD BY AUTOMATED COUNT: 14.6 % (ref 10–15)
GLUCOSE SERPL-MCNC: 86 MG/DL (ref 70–99)
HCT VFR BLD AUTO: 41.4 % (ref 35–47)
HGB BLD-MCNC: 12.9 G/DL (ref 11.7–15.7)
MCH RBC QN AUTO: 27.8 PG (ref 26.5–33)
MCHC RBC AUTO-ENTMCNC: 31.2 G/DL (ref 31.5–36.5)
MCV RBC AUTO: 89 FL (ref 78–100)
PLATELET # BLD AUTO: 481 10E3/UL (ref 150–450)
POTASSIUM SERPL-SCNC: 4.4 MMOL/L (ref 3.4–5.3)
PROT SERPL-MCNC: 6.7 G/DL (ref 6.4–8.3)
RBC # BLD AUTO: 4.64 10E6/UL (ref 3.8–5.2)
SODIUM SERPL-SCNC: 137 MMOL/L (ref 135–145)
TSH SERPL DL<=0.005 MIU/L-ACNC: 2.5 UIU/ML (ref 0.3–4.2)
VIT B12 SERPL-MCNC: 259 PG/ML (ref 232–1245)
VIT D+METAB SERPL-MCNC: 45 NG/ML (ref 20–50)
WBC # BLD AUTO: 9.1 10E3/UL (ref 4–11)

## 2024-07-03 PROCEDURE — 80053 COMPREHEN METABOLIC PANEL: CPT | Mod: ORL

## 2024-07-03 PROCEDURE — 85027 COMPLETE CBC AUTOMATED: CPT | Mod: ORL

## 2024-07-03 PROCEDURE — P9604 ONE-WAY ALLOW PRORATED TRIP: HCPCS | Mod: ORL

## 2024-07-03 PROCEDURE — 36415 COLL VENOUS BLD VENIPUNCTURE: CPT | Mod: ORL

## 2024-07-03 PROCEDURE — 84443 ASSAY THYROID STIM HORMONE: CPT | Mod: ORL

## 2024-07-03 PROCEDURE — 82306 VITAMIN D 25 HYDROXY: CPT | Mod: ORL

## 2024-07-03 PROCEDURE — 82607 VITAMIN B-12: CPT | Mod: ORL

## 2025-01-05 ENCOUNTER — HEALTH MAINTENANCE LETTER (OUTPATIENT)
Age: OVER 89
End: 2025-01-05

## 2025-02-03 ENCOUNTER — APPOINTMENT (OUTPATIENT)
Dept: CT IMAGING | Facility: CLINIC | Age: OVER 89
DRG: 193 | End: 2025-02-03
Attending: EMERGENCY MEDICINE
Payer: COMMERCIAL

## 2025-02-03 ENCOUNTER — HOSPITAL ENCOUNTER (INPATIENT)
Facility: CLINIC | Age: OVER 89
LOS: 3 days | Discharge: NURSING, REHAB OR LONG-TERM CARE | End: 2025-02-06
Attending: EMERGENCY MEDICINE | Admitting: INTERNAL MEDICINE
Payer: COMMERCIAL

## 2025-02-03 DIAGNOSIS — R82.90 ABNORMAL URINALYSIS: ICD-10-CM

## 2025-02-03 DIAGNOSIS — J96.01 ACUTE RESPIRATORY FAILURE WITH HYPOXIA (H): ICD-10-CM

## 2025-02-03 DIAGNOSIS — R93.89 ABNORMAL CT SCAN: ICD-10-CM

## 2025-02-03 DIAGNOSIS — J18.9 PNEUMONIA OF RIGHT LOWER LOBE DUE TO INFECTIOUS ORGANISM: Primary | ICD-10-CM

## 2025-02-03 PROBLEM — F03.90 SENILE DEMENTIA WITHOUT BEHAVIORAL DISTURBANCE (H): Status: ACTIVE | Noted: 2020-06-02

## 2025-02-03 PROBLEM — E27.8 ADRENAL MASS: Status: ACTIVE | Noted: 2025-02-03

## 2025-02-03 LAB
ALBUMIN SERPL BCG-MCNC: 3.6 G/DL (ref 3.5–5.2)
ALBUMIN UR-MCNC: 20 MG/DL
ALP SERPL-CCNC: 80 U/L (ref 40–150)
ALT SERPL W P-5'-P-CCNC: 8 U/L (ref 0–50)
ANION GAP SERPL CALCULATED.3IONS-SCNC: 8 MMOL/L (ref 7–15)
APPEARANCE UR: ABNORMAL
AST SERPL W P-5'-P-CCNC: 14 U/L (ref 0–45)
BACTERIA #/AREA URNS HPF: ABNORMAL /HPF
BASOPHILS # BLD AUTO: 0.1 10E3/UL (ref 0–0.2)
BASOPHILS NFR BLD AUTO: 1 %
BILIRUB SERPL-MCNC: 0.2 MG/DL
BILIRUB UR QL STRIP: NEGATIVE
BUN SERPL-MCNC: 20.3 MG/DL (ref 8–23)
CALCIUM SERPL-MCNC: 9.5 MG/DL (ref 8.8–10.4)
CHLORIDE SERPL-SCNC: 102 MMOL/L (ref 98–107)
COLOR UR AUTO: YELLOW
CREAT SERPL-MCNC: 0.77 MG/DL (ref 0.51–0.95)
EGFRCR SERPLBLD CKD-EPI 2021: 72 ML/MIN/1.73M2
EOSINOPHIL # BLD AUTO: 0.2 10E3/UL (ref 0–0.7)
EOSINOPHIL NFR BLD AUTO: 2 %
ERYTHROCYTE [DISTWIDTH] IN BLOOD BY AUTOMATED COUNT: 13.5 % (ref 10–15)
FLUAV RNA SPEC QL NAA+PROBE: NEGATIVE
FLUBV RNA RESP QL NAA+PROBE: NEGATIVE
GLUCOSE SERPL-MCNC: 96 MG/DL (ref 70–99)
GLUCOSE UR STRIP-MCNC: NEGATIVE MG/DL
HCO3 SERPL-SCNC: 27 MMOL/L (ref 22–29)
HCT VFR BLD AUTO: 41.7 % (ref 35–47)
HGB BLD-MCNC: 12.6 G/DL (ref 11.7–15.7)
HGB UR QL STRIP: ABNORMAL
HOLD SPECIMEN: NORMAL
IMM GRANULOCYTES # BLD: 0.1 10E3/UL
IMM GRANULOCYTES NFR BLD: 1 %
KETONES UR STRIP-MCNC: NEGATIVE MG/DL
LEUKOCYTE ESTERASE UR QL STRIP: ABNORMAL
LYMPHOCYTES # BLD AUTO: 2.3 10E3/UL (ref 0.8–5.3)
LYMPHOCYTES NFR BLD AUTO: 21 %
MCH RBC QN AUTO: 27.2 PG (ref 26.5–33)
MCHC RBC AUTO-ENTMCNC: 30.2 G/DL (ref 31.5–36.5)
MCV RBC AUTO: 90 FL (ref 78–100)
MONOCYTES # BLD AUTO: 1 10E3/UL (ref 0–1.3)
MONOCYTES NFR BLD AUTO: 9 %
MUCOUS THREADS #/AREA URNS LPF: PRESENT /LPF
NEUTROPHILS # BLD AUTO: 7.3 10E3/UL (ref 1.6–8.3)
NEUTROPHILS NFR BLD AUTO: 67 %
NITRATE UR QL: NEGATIVE
NRBC # BLD AUTO: 0 10E3/UL
NRBC BLD AUTO-RTO: 0 /100
PH UR STRIP: 6.5 [PH] (ref 5–7)
PLATELET # BLD AUTO: 307 10E3/UL (ref 150–450)
POTASSIUM SERPL-SCNC: 4.7 MMOL/L (ref 3.4–5.3)
PROCALCITONIN SERPL IA-MCNC: 0.09 NG/ML
PROT SERPL-MCNC: 6.8 G/DL (ref 6.4–8.3)
RADIOLOGIST FLAGS: ABNORMAL
RBC # BLD AUTO: 4.63 10E6/UL (ref 3.8–5.2)
RBC URINE: 24 /HPF
RSV RNA SPEC NAA+PROBE: NEGATIVE
SARS-COV-2 RNA RESP QL NAA+PROBE: NEGATIVE
SODIUM SERPL-SCNC: 137 MMOL/L (ref 135–145)
SP GR UR STRIP: 1.03 (ref 1–1.03)
SQUAMOUS EPITHELIAL: 7 /HPF
UROBILINOGEN UR STRIP-MCNC: NORMAL MG/DL
WBC # BLD AUTO: 10.9 10E3/UL (ref 4–11)
WBC URINE: 56 /HPF

## 2025-02-03 PROCEDURE — 36415 COLL VENOUS BLD VENIPUNCTURE: CPT | Performed by: EMERGENCY MEDICINE

## 2025-02-03 PROCEDURE — 82040 ASSAY OF SERUM ALBUMIN: CPT | Performed by: EMERGENCY MEDICINE

## 2025-02-03 PROCEDURE — 85041 AUTOMATED RBC COUNT: CPT | Performed by: EMERGENCY MEDICINE

## 2025-02-03 PROCEDURE — 258N000003 HC RX IP 258 OP 636: Performed by: EMERGENCY MEDICINE

## 2025-02-03 PROCEDURE — 99223 1ST HOSP IP/OBS HIGH 75: CPT

## 2025-02-03 PROCEDURE — 99285 EMERGENCY DEPT VISIT HI MDM: CPT | Mod: 25 | Performed by: EMERGENCY MEDICINE

## 2025-02-03 PROCEDURE — 250N000009 HC RX 250: Performed by: EMERGENCY MEDICINE

## 2025-02-03 PROCEDURE — 71260 CT THORAX DX C+: CPT

## 2025-02-03 PROCEDURE — 81001 URINALYSIS AUTO W/SCOPE: CPT | Performed by: EMERGENCY MEDICINE

## 2025-02-03 PROCEDURE — 250N000011 HC RX IP 250 OP 636: Performed by: EMERGENCY MEDICINE

## 2025-02-03 PROCEDURE — 120N000001 HC R&B MED SURG/OB

## 2025-02-03 PROCEDURE — 85025 COMPLETE CBC W/AUTO DIFF WBC: CPT | Performed by: EMERGENCY MEDICINE

## 2025-02-03 PROCEDURE — 84145 PROCALCITONIN (PCT): CPT | Performed by: EMERGENCY MEDICINE

## 2025-02-03 PROCEDURE — 87637 SARSCOV2&INF A&B&RSV AMP PRB: CPT | Performed by: EMERGENCY MEDICINE

## 2025-02-03 PROCEDURE — 87086 URINE CULTURE/COLONY COUNT: CPT | Performed by: EMERGENCY MEDICINE

## 2025-02-03 PROCEDURE — 80053 COMPREHEN METABOLIC PANEL: CPT | Performed by: EMERGENCY MEDICINE

## 2025-02-03 PROCEDURE — 96374 THER/PROPH/DIAG INJ IV PUSH: CPT

## 2025-02-03 PROCEDURE — 96361 HYDRATE IV INFUSION ADD-ON: CPT

## 2025-02-03 PROCEDURE — 85018 HEMOGLOBIN: CPT | Performed by: EMERGENCY MEDICINE

## 2025-02-03 PROCEDURE — 99285 EMERGENCY DEPT VISIT HI MDM: CPT | Mod: 25

## 2025-02-03 RX ORDER — HYOSCYAMINE SULFATE 0.12 MG/1
0.12 TABLET SUBLINGUAL EVERY 4 HOURS PRN
COMMUNITY

## 2025-02-03 RX ORDER — HALOPERIDOL 5 MG/ML
2 INJECTION INTRAMUSCULAR
Status: COMPLETED | OUTPATIENT
Start: 2025-02-03 | End: 2025-02-03

## 2025-02-03 RX ORDER — ACETAMINOPHEN 650 MG/1
650 SUPPOSITORY RECTAL EVERY 4 HOURS PRN
COMMUNITY

## 2025-02-03 RX ORDER — POLYETHYLENE GLYCOL 3350 17 G/17G
1 POWDER, FOR SOLUTION ORAL DAILY
COMMUNITY

## 2025-02-03 RX ORDER — AZITHROMYCIN 250 MG/1
500 TABLET, FILM COATED ORAL ONCE
Status: ON HOLD | COMMUNITY
Start: 2025-02-03 | End: 2025-02-05

## 2025-02-03 RX ORDER — SODIUM CHLORIDE 9 MG/ML
1000 INJECTION, SOLUTION INTRAVENOUS CONTINUOUS
Status: DISCONTINUED | OUTPATIENT
Start: 2025-02-03 | End: 2025-02-04

## 2025-02-03 RX ORDER — AMOXICILLIN AND CLAVULANATE POTASSIUM 500; 125 MG/1; MG/1
1 TABLET, FILM COATED ORAL 3 TIMES DAILY
Status: ON HOLD | COMMUNITY
Start: 2025-02-03 | End: 2025-02-05

## 2025-02-03 RX ORDER — ALBUTEROL SULFATE 90 UG/1
2 AEROSOL, METERED RESPIRATORY (INHALATION) EVERY 4 HOURS PRN
COMMUNITY
Start: 2025-01-31

## 2025-02-03 RX ORDER — IOPAMIDOL 755 MG/ML
79 INJECTION, SOLUTION INTRAVASCULAR ONCE
Status: COMPLETED | OUTPATIENT
Start: 2025-02-03 | End: 2025-02-03

## 2025-02-03 RX ORDER — POLYETHYLENE GLYCOL 3350 17 G/17G
1 POWDER, FOR SOLUTION ORAL DAILY PRN
Status: ON HOLD | COMMUNITY
End: 2025-02-05

## 2025-02-03 RX ORDER — BISACODYL 10 MG
10 SUPPOSITORY, RECTAL RECTAL DAILY PRN
COMMUNITY
Start: 2023-08-16

## 2025-02-03 RX ORDER — OXYCODONE HYDROCHLORIDE 5 MG/1
2.5 TABLET ORAL 2 TIMES DAILY PRN
COMMUNITY
Start: 2024-12-17

## 2025-02-03 RX ORDER — CEFTRIAXONE 2 G/1
2 INJECTION, POWDER, FOR SOLUTION INTRAMUSCULAR; INTRAVENOUS EVERY 24 HOURS
Status: DISCONTINUED | OUTPATIENT
Start: 2025-02-03 | End: 2025-02-05

## 2025-02-03 RX ORDER — LOSARTAN POTASSIUM 25 MG/1
25 TABLET ORAL DAILY
COMMUNITY
Start: 2025-01-16

## 2025-02-03 RX ORDER — BENZONATATE 100 MG/1
100 CAPSULE ORAL EVERY 8 HOURS PRN
COMMUNITY
Start: 2025-01-31

## 2025-02-03 RX ORDER — LORAZEPAM 0.5 MG/1
0.5 TABLET ORAL AT BEDTIME
COMMUNITY

## 2025-02-03 RX ADMIN — HALOPERIDOL LACTATE 2 MG: 5 INJECTION, SOLUTION INTRAMUSCULAR at 18:10

## 2025-02-03 RX ADMIN — IOPAMIDOL 79 ML: 755 INJECTION, SOLUTION INTRAVENOUS at 18:38

## 2025-02-03 RX ADMIN — AZITHROMYCIN MONOHYDRATE 500 MG: 500 INJECTION, POWDER, LYOPHILIZED, FOR SOLUTION INTRAVENOUS at 21:52

## 2025-02-03 RX ADMIN — SODIUM CHLORIDE 1000 ML: 9 INJECTION, SOLUTION INTRAVENOUS at 20:54

## 2025-02-03 RX ADMIN — CEFTRIAXONE SODIUM 2 G: 2 INJECTION, POWDER, FOR SOLUTION INTRAMUSCULAR; INTRAVENOUS at 20:50

## 2025-02-03 RX ADMIN — SODIUM CHLORIDE 60 ML: 9 INJECTION, SOLUTION INTRAVENOUS at 18:39

## 2025-02-03 RX ADMIN — SODIUM CHLORIDE 500 ML: 9 INJECTION, SOLUTION INTRAVENOUS at 18:15

## 2025-02-03 ASSESSMENT — ACTIVITIES OF DAILY LIVING (ADL)
ADLS_ACUITY_SCORE: 58

## 2025-02-03 ASSESSMENT — ENCOUNTER SYMPTOMS
GASTROINTESTINAL NEGATIVE: 1
CONSTITUTIONAL NEGATIVE: 1
ALLERGIC/IMMUNOLOGIC NEGATIVE: 1
ENDOCRINE NEGATIVE: 1
SHORTNESS OF BREATH: 1
HEMATOLOGIC/LYMPHATIC NEGATIVE: 1
MUSCULOSKELETAL NEGATIVE: 1
EYES NEGATIVE: 1
WEAKNESS: 1
PSYCHIATRIC NEGATIVE: 1

## 2025-02-03 NOTE — ED PROVIDER NOTES
History     Chief Complaint   Patient presents with    Wheezing     HPI  Gabriela Agrawal is a 89 year old female who presents for evaluation with   Concern about hypoxia at her memory care unit with recent assessment for possible pneumonia.  History is limited from the patient due to underlying dementia EMS providers report patient was hypoxic 88 to 91% on room air, normal blood pressure. EMS providers report patient had an x-ray that did not show any pneumonia care team     Reviewed hospital course and 5/31/23.  Patient has a history of hyperlipidemia, hypertension, squamous cell carcinoma, basal cell carcinoma, history of senile dementia.    On examination patient was not able to provide detail of her presentation.  Patient was noted to be 80% on room air.  With 2 L she was 94%.  However blood pressure was 87/66.  Discussion with family after review of her POLST form dated on 4/1/21 where it was noted that the patient is to have selective treatment and DNR. Spoke with Son- Otoniel Agrawal by phone who confirmed patient's CODE STATUS he is currently in [place], [place].     Allergies:  Allergies   Allergen Reactions    Nkda [No Known Drug Allergy]        Problem List:    Patient Active Problem List    Diagnosis Date Noted    Acute respiratory failure with hypoxia (H) 02/03/2025     Priority: Medium    Adrenal mass 02/03/2025     Priority: Medium    Pneumonia 02/03/2025     Priority: Medium    Closed displaced fracture of lateral condyle of left humerus, initial encounter 06/01/2023     Priority: Medium    Accidental fall, initial encounter 06/01/2023     Priority: Medium    Closed displaced fracture of lateral epicondyle of left humerus, unspecified fracture morphology, initial encounter 06/01/2023     Priority: Medium    Closed fracture dislocation of left elbow, initial encounter 05/31/2023     Priority: Medium    Senile dementia without behavioral disturbance (H) 06/02/2020     Priority: Medium     Neuropsych  testing 6/2020.        Elevated fasting blood sugar 01/07/2019     Priority: Medium    Grief reaction 12/11/2015     Priority: Medium    Serum calcium elevated 10/07/2015     Priority: Medium    Osteopenia of multiple sites 10/06/2015     Priority: Medium     On calcium and vitamin D    Dexa 2015 - 1. The T-score of the lumbar spine in the region of L1-L4 is -0.5.  This correlates with normal bone mineral density. If one looks at the  L2 vertebral body alone the T score is -1.5 which correlates with  mild-moderate osteopenia.     2. The T-score of the right femoral neck is -2.4. This correlates with  severe osteopenia.     3. The T-score of the left femoral neck is -2.3. This correlates with  severe osteopenia.      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     Priority: Medium    Dermal nevus 01/29/2013     Priority: Medium    AK (actinic keratosis) 01/29/2013     Priority: Medium    Hyperlipidemia LDL goal <130 10/17/2012     Priority: Medium     Stopped statin 6/9/2020 due to advanced age      Basal cell carcinoma, lip 09/24/2012     Priority: Medium    Squamous cell carcinoma in situ 09/24/2012     Priority: Medium    History of melanoma 09/24/2012     Priority: Medium        Past Medical History:    Past Medical History:   Diagnosis Date    Basal cell carcinoma     Bronchiolitis obliterans (H)     Diffuse cystic mastopathy     Disorder of bone and cartilage, unspecified     Malignant melanoma (H)     Nonspecific (abnormal) findings on radiological and other examination of lung field     Other abnormal blood chemistry     Other specified alveolar and parietoalveolar pneumonopathies     Squamous cell carcinoma     Syncope and collapse        Past Surgical History:    Past Surgical History:   Procedure Laterality Date    APPENDECTOMY OPEN      BREAST SURGERY      biopsy     COLONOSCOPY  2013    Procedure: COLONOSCOPY;  Colonoscopy  ;  Surgeon: Norah Whaley MD;  Location: [facility]    EYE SURGERY      keratoplasty    HERNIA REPAIR      left inginal    HYSTERECTOMY      including ovaries    KNEE SURGERY      ,     LUNG SURGERY      biopsy    melanoma  resection         Family History:    Family History   Problem Relation Age of Onset    Cancer Other     Family History Negative Other        Social History:  Marital Status:   [5]  Social History     Tobacco Use    Smoking status: Former     Current packs/day: 0.00     Average packs/day: 1.5 packs/day for 22.5 years (33.7 ttl pk-yrs)     Types: Cigarettes     Start date: 7/3/1959     Quit date: 1982     Years since quittin.1    Smokeless tobacco: Never   Substance Use Topics    Alcohol use: Yes     Comment: 4-6 a week    Drug use: No        Medications:    acetaminophen (TYLENOL) 500 MG tablet  acetaminophen (TYLENOL) 650 MG suppository  amoxicillin-clavulanate (AUGMENTIN) 500-125 MG tablet  azithromycin (ZITHROMAX) 250 MG tablet  benzonatate (TESSALON) 100 MG capsule  bisacodyl (DULCOLAX) 10 MG suppository  citalopram (CELEXA) 20 MG tablet  hyoscyamine (LEVSIN/SL) 0.125 MG sublingual tablet  LORazepam (ATIVAN) 0.5 MG tablet  losartan (COZAAR) 25 MG tablet  oxyCODONE (ROXICODONE) 5 MG tablet  polyethylene glycol (MIRALAX) 17 GM/Dose powder  polyethylene glycol (MIRALAX) 17 GM/Dose powder  VENTOLIN  (90 Base) MCG/ACT inhaler          Review of Systems   Constitutional: Negative.    HENT: Negative.     Eyes: Negative.    Respiratory:  Positive for shortness of breath.    Gastrointestinal: Negative.    Endocrine: Negative.    Genitourinary: Negative.    Musculoskeletal: Negative.    Skin: Negative.    Allergic/Immunologic: Negative.    Neurological:  Positive for weakness.   Hematological: Negative.    Psychiatric/Behavioral: Negative.     All other systems reviewed and are negative.      Physical  Exam   BP: (!) 87/66  Pulse: 88  Temp: 98.5  F (36.9  C)  Resp: 15  SpO2: (!) 90 %      Physical Exam  Constitutional:       Appearance: She is ill-appearing.   HENT:      Head: Normocephalic and atraumatic.      Nose: Nose normal.   Eyes:      Extraocular Movements: Extraocular movements intact.      Pupils: Pupils are equal, round, and reactive to light.   Skin:     Capillary Refill: Capillary refill takes less than 2 seconds.      Coloration: Skin is not jaundiced or pale.      Findings: No bruising, erythema, lesion or rash.   Neurological:      General: No focal deficit present.      Mental Status: She is oriented to person, place, and time.   Psychiatric:         Mood and Affect: Mood normal.         Behavior: Behavior normal.         ED Course        Procedures              Critical Care time:  none          ED medications:  Medications   sodium chloride 0.9 % infusion (1,000 mLs Intravenous $New Bag 2/3/25 2054)   cefTRIAXone (ROCEPHIN) 2 g vial to attach to  ml bag for ADULTS or NS 50 ml bag for PEDS (0 g Intravenous Stopped 2/3/25 2312)   azithromycin (ZITHROMAX) 250 mg in sodium chloride 0.9 % 250 mL intermittent infusion (has no administration in time range)   sodium chloride 0.9% BOLUS 500 mL (0 mLs Intravenous Stopped 2/3/25 2047)   haloperidol lactate (HALDOL) injection 2 mg (2 mg Intravenous $Given 2/3/25 1810)   iopamidol (ISOVUE-370) solution 79 mL (79 mLs Intravenous $Given 2/3/25 1838)   sodium chloride 0.9 % bag 500mL for CT scan flush use (60 mLs As instructed $Given 2/3/25 1839)   azithromycin (ZITHROMAX) 500 mg in sodium chloride 0.9 % 250 mL intermittent infusion (0 mg Intravenous Stopped 2/3/25 2312)     ED Vitals:  Vitals:    02/03/25 1745 02/03/25 1800 02/03/25 1815 02/03/25 2115   BP: 132/74 117/87 126/62 (!) 159/79   Pulse: 82 82 79    Resp:       Temp:       TempSrc:       SpO2: (!) 91%  (!) 90% (!) 91%        ED labs and imaging:  Results for orders placed or performed during  the hospital encounter of 02/03/25   CT Chest w Contrast     Status: Abnormal   Result Value Ref Range    Radiologist flags (Urgent)      Probably benign 5.8 cm left adrenal mass may benefit from outpatient specialist consultation if not previously performed.    Narrative    EXAM: CT CHEST W CONTRAST  LOCATION: [facility]  DATE: 2/3/2025    INDICATION: Hypoxia, hypotension, cough, new oxygen requirement. Advanced age, Hx of Dementia. Concern for pneumonia. Evaluate for acute cardiopulmonary process  COMPARISON: 04/19/2021  TECHNIQUE: CT chest with IV contrast. Multiplanar reformats were obtained. Dose reduction techniques were used. Motion artifact limits evaluation.    CONTRAST: 79 ml Isovue 370    FINDINGS:   LUNGS AND PLEURA: Scattered linear atelectasis in the lungs. There are few scattered solid noncalcified and calcified probable granulomas in the lungs. Nodules measure up to 1.0 cm in the left lower lobe series 4, image 199. There is mild to moderate   consolidation in the right lower lobe. This is somewhat peripheral. No pleural effusions or pneumothorax.    MEDIASTINUM/AXILLAE: Small low-density nodules in the thyroid gland. No thoracic adenopathy. No   cardiomegaly. No pericardial effusion. Normal caliber thoracic aorta. Aortic atherosclerosis. No acute abnormality. Although not a dedicated PE study, no gross PE.    CORONARY ARTERY CALCIFICATION: Mild.    UPPER ABDOMEN: There are low-density lesions in the liver, measuring up to 4.6 cm in size. There is scattered renal cortical thinning and low-density lesions in the kidneys, measuring up to 3.2 cm in size. In the left adrenal gland, there is a 5.8 x 3.5   cm soft tissue mass with a central portion that measures 2.8 x 3.0 cm and is fat density. No calcification. This has been present since 2010, slightly smaller at that time.    MUSCULOSKELETAL: Normal.      Impression    IMPRESSION:   1.  Findings consistent with acute right  lower lobe pneumonia. Please note that this is not a PE study. If there is clinical concern for PE and pulmonary infarction, then PE study is recommended.    2.  There are bilateral pulmonary nodules. Some are noncalcified and measure up to 1.0 cm in the left lower lobe. These are similar to priors, including in 2010. There are likely benign, and do not require additional follow-up imaging.    3.  Relatively stable or slow growing 5.8 cm left adrenal mass, which may represent an adrenal adenoma, with an additional myelolipoma. There can be an increased risk of spontaneous hemorrhage with large adrenal masses. Outpatient specialist consultation   is recommended if not previously performed.    4.  Probable cysts in the liver and kidneys do not require additional follow-up imaging.      [Access Center: Probably benign 5.8 cm left adrenal mass may benefit from outpatient specialist consultation if not previously performed.]    This report will be copied to the [facility] to ensure a provider acknowledges the finding. Access Center is available Monday through Friday 8am-3:30 pm.   Comprehensive metabolic panel     Status: Normal   Result Value Ref Range    Sodium 137 135 - 145 mmol/L    Potassium 4.7 3.4 - 5.3 mmol/L    Carbon Dioxide (CO2) 27 22 - 29 mmol/L    Anion Gap 8 7 - 15 mmol/L    Urea Nitrogen 20.3 8.0 - 23.0 mg/dL    Creatinine 0.77 0.51 - 0.95 mg/dL    GFR Estimate 72 >60 mL/min/1.73m2    Calcium 9.5 8.8 - 10.4 mg/dL    Chloride 102 98 - 107 mmol/L    Glucose 96 70 - 99 mg/dL    Alkaline Phosphatase 80 40 - 150 U/L    AST 14 0 - 45 U/L    ALT 8 0 - 50 U/L    Protein Total 6.8 6.4 - 8.3 g/dL    Albumin 3.6 3.5 - 5.2 g/dL    Bilirubin Total 0.2 <=1.2 mg/dL   Procalcitonin     Status: Normal   Result Value Ref Range    Procalcitonin 0.09 <0.50 ng/mL   Influenza A/B, RSV and SARS-CoV2 PCR (COVID-19) Nasopharyngeal     Status: Normal    Specimen: Nasopharyngeal; Swab   Result Value Ref Range     Influenza A PCR Negative Negative    Influenza B PCR Negative Negative    RSV PCR Negative Negative    SARS CoV2 PCR Negative Negative    Narrative    Testing was performed using the Xpert Xpress CoV2/Flu/RSV Assay on the Cepheid GeneXpert Instrument. This test should be ordered for the detection of SARS-CoV2, influenza, and RSV viruses in individuals with signs and symptoms of respiratory tract infection. This test is for in vitro diagnostic use under the [place] FDA for laboratories certified under CLIA to perform high or moderate complexity testing. This test has been [place] FDA cleared. A negative result does not rule out the presence of PCR inhibitors in the specimen or target RNA in concentration below the limit of detection for the assay. If only one viral target is positive but coinfection with multiple targets is suspected, the sample should be re-tested with another FDA cleared, approved, or authorized test, if coninfection would change clinical management. This test was validated by the [facility]. These laboratories are certified under the Clinical Laboratory Improvement Amendments of 1988 (CLIA-88) as qualified to perfom high complexity laboratory testing.   CBC with platelets and differential     Status: Abnormal   Result Value Ref Range    WBC Count 10.9 4.0 - 11.0 10e3/uL    RBC Count 4.63 3.80 - 5.20 10e6/uL    Hemoglobin 12.6 11.7 - 15.7 g/dL    Hematocrit 41.7 35.0 - 47.0 %    MCV 90 78 - 100 fL    MCH 27.2 26.5 - 33.0 pg    MCHC 30.2 (L) 31.5 - 36.5 g/dL    RDW 13.5 10.0 - 15.0 %    Platelet Count 307 150 - 450 10e3/uL    % Neutrophils 67 %    % Lymphocytes 21 %    % Monocytes 9 %    % Eosinophils 2 %    % Basophils 1 %    % Immature Granulocytes 1 %    NRBCs per 100 WBC 0 <1 /100    Absolute Neutrophils 7.3 1.6 - 8.3 10e3/uL    Absolute Lymphocytes 2.3 0.8 - 5.3 10e3/uL    Absolute Monocytes 1.0 0.0 - 1.3 10e3/uL    Absolute Eosinophils 0.2 0.0 - 0.7 10e3/uL    Absolute Basophils 0.1  0.0 - 0.2 10e3/uL    Absolute Immature Granulocytes 0.1 <=0.4 10e3/uL    Absolute NRBCs 0.0 10e3/uL   [place] Draw     Status: None    Narrative    The following orders were created for panel order [place] Draw.  Procedure                               Abnormality         Status                     ---------                               -----------         ------                     Extra Blue Top Tube[[id number]]                              Final result                 Please view results for these tests on the individual orders.   Extra Blue Top Tube     Status: None   Result Value Ref Range    Hold Specimen JI    UA with Microscopic reflex to Culture     Status: Abnormal    Specimen: Urine, Catheter   Result Value Ref Range    Color Urine Yellow Colorless, Straw, Light Yellow, Yellow    Appearance Urine Cloudy (A) Clear    Glucose Urine Negative Negative mg/dL    Bilirubin Urine Negative Negative    Ketones Urine Negative Negative mg/dL    Specific Gravity Urine 1.033 1.003 - 1.035    Blood Urine Small (A) Negative    pH Urine 6.5 5.0 - 7.0    Protein Albumin Urine 20 (A) Negative mg/dL    Urobilinogen Urine Normal Normal, 2.0 mg/dL    Nitrite Urine Negative Negative    Leukocyte Esterase Urine Trace (A) Negative    Bacteria Urine Many (A) None Seen /HPF    Mucus Urine Present (A) None Seen /LPF    RBC Urine 24 (H) <=2 /HPF    WBC Urine 56 (H) <=5 /HPF    Squamous Epithelials Urine 7 (H) <=1 /HPF    Narrative    Urine Culture ordered based on laboratory criteria   CBC with platelets differential     Status: Abnormal    Narrative    The following orders were created for panel order CBC with platelets differential.  Procedure                               Abnormality         Status                     ---------                               -----------         ------                     CBC with platelets and d...[[id number]]  Abnormal            Final result                 Please view results for these tests on  the individual orders.     Assessments & Plan (with Medical Decision Making)   Assessment Summary and clinical Impression: 89-year-old female who presented by EMS from her memory care unit with concern about hypoxia and recent workup for pneumonia.  Patient was noted by EMS providers to be hypoxic 88% on room air.  She is not able to provide details of her presentation due to dementia.  Additional history was obtained by phone from her son who is the POA who confirmed patient's CODE STATUS as DNR selective treatment but okay with hospitalization for empiric treatment for infectious etiology.  Patient was 90% on room air.  Improved to 94% on 2 L.  Blood pressure on arrival was 87/66.  Patient was afebrile.  Workup  revealed concern for right lower lobe pneumonia.  Additional findings outlined in radiology report.  Outpatient follow-up was recommended for what appears to be a stable slow-growing 5.8 cm left adrenal mass which could represent an adrenal adenoma.  With new oxygen requirement and right lower lobe pneumonia patient is admitted to medicine for further care and started empirically on IV antibiotics for acute respiratory failure with hypoxia due to right lower lobe pneumonia and abnormal urinalysis was obtained and urine cultures pending.     ED course and plan:  Reviewed the medical record.  Reviewed visit on 5/31/23.  Spoke with patient's son by phone at 3.30pm at [phone]-confirm patient's CODE STATUS and patient's presentation to the department.  We agreed to broaden the workup to manage infectious etiologies and treat accordingly. To help facilitate cares due to patient hitting members with the care team she received intravenous haloperidol  Blood work today on arrival revealed normal white count.  Hemoglobin 12.6. Normal procalcitonin.  Normal white count.  Hemoglobin 12.6.  Negative viral respiratory panel test.  CT chest revealed acute right lower lobe pneumonia with additional findings outlined  radiology report.  See details in radiology report.    Given patient's CT findings with new right lower lobe pneumonia she was started on IV antibiotics and admitted to medicine for further care due to new oxygen requirement.  Urinalysis today did reveal 56 white cells per high-power field, trace leukocyte esterase.  Urine culture pending.    Spoke with PARIS Sanchez PA-C- admitting provider at 8.25pm who accepted patient for further care. We reviewed POLST record, discussion with son by phone.     Updated son by phone after completion of workup and plan of care. At shift end patient was awaiting transfer to the medical floor for on-going care.      Disclaimer: This note consists of symbols derived from keyboarding, dictation and/or voice recognition software. As a result, there may be errors in the script that have gone undetected. Please consider this when interpreting information found in this chart.   I have reviewed the nursing notes.    I have reviewed the findings, diagnosis, plan and need for follow up with the patient.           Medical Decision Making  The patient's presentation was of high complexity (history of dementia, hypoxia at rest, hypotension,).    The patient's evaluation involved:  ordering and/or review of 2 test(s) in this encounter (oxygen supplementation, chest imaging, blood work, intravenous antibiotics )    The patient's management necessitated high risk (inpatient care).        New Prescriptions    No medications on file       Final diagnoses:   Acute respiratory failure with hypoxia (H)   Abnormal CT scan - IMPRESSION:  1.  Findings consistent with acute right lower lobe pneumonia. Please note that this is not a PE study. If there is clinical concern for PE and pulmonary infarction, then PE study is recommended.   2.  There are bilateral pulmonary nodules. Some are noncalcified and measure up to 1.0 cm in the left lower lobe. These are similar to priors, including in 2010. There are likely  benign, and do not require additional follow-up imaging.   3.  Relatively stable or slow growing 5.8 cm left adrenal mass, which may represent an adrenal adenoma, with an additional myelolipoma. There can be an increased risk of spontaneous hemorrhage with large adrenal masses. Outpatient specialist consultation  is recommended if not previously performed.   4.  Probable cysts in the liver and kidneys do not require additional follow-up imaging.     [Access Center: Probably benign 5.8 cm left adrenal mass may benefit from outpatient specialist consultation if not previously performed.]   Abnormal urinalysis - trace leukocyte esterase, 56 white cells, 7 squamous cells,        2/3/2025   [facility] EMERGENCY DEPT       Alireza Masterson MD  02/03/25 [id number]

## 2025-02-04 PROBLEM — N30.00 ACUTE CYSTITIS: Status: ACTIVE | Noted: 2025-02-04

## 2025-02-04 LAB
ANION GAP SERPL CALCULATED.3IONS-SCNC: 12 MMOL/L (ref 7–15)
BACTERIA UR CULT: ABNORMAL
BUN SERPL-MCNC: 14.1 MG/DL (ref 8–23)
CALCIUM SERPL-MCNC: 8.9 MG/DL (ref 8.8–10.4)
CHLORIDE SERPL-SCNC: 106 MMOL/L (ref 98–107)
CREAT SERPL-MCNC: 0.63 MG/DL (ref 0.51–0.95)
EGFRCR SERPLBLD CKD-EPI 2021: 83 ML/MIN/1.73M2
ERYTHROCYTE [DISTWIDTH] IN BLOOD BY AUTOMATED COUNT: 13.5 % (ref 10–15)
GLUCOSE SERPL-MCNC: 100 MG/DL (ref 70–99)
HCO3 SERPL-SCNC: 23 MMOL/L (ref 22–29)
HCT VFR BLD AUTO: 38.5 % (ref 35–47)
HGB BLD-MCNC: 11.9 G/DL (ref 11.7–15.7)
MCH RBC QN AUTO: 27 PG (ref 26.5–33)
MCHC RBC AUTO-ENTMCNC: 30.9 G/DL (ref 31.5–36.5)
MCV RBC AUTO: 88 FL (ref 78–100)
PLATELET # BLD AUTO: 295 10E3/UL (ref 150–450)
POTASSIUM SERPL-SCNC: 4.3 MMOL/L (ref 3.4–5.3)
RBC # BLD AUTO: 4.4 10E6/UL (ref 3.8–5.2)
SODIUM SERPL-SCNC: 141 MMOL/L (ref 135–145)
WBC # BLD AUTO: 10.6 10E3/UL (ref 4–11)

## 2025-02-04 PROCEDURE — 250N000013 HC RX MED GY IP 250 OP 250 PS 637

## 2025-02-04 PROCEDURE — 85048 AUTOMATED LEUKOCYTE COUNT: CPT

## 2025-02-04 PROCEDURE — 36415 COLL VENOUS BLD VENIPUNCTURE: CPT

## 2025-02-04 PROCEDURE — 258N000003 HC RX IP 258 OP 636: Performed by: STUDENT IN AN ORGANIZED HEALTH CARE EDUCATION/TRAINING PROGRAM

## 2025-02-04 PROCEDURE — 250N000013 HC RX MED GY IP 250 OP 250 PS 637: Performed by: STUDENT IN AN ORGANIZED HEALTH CARE EDUCATION/TRAINING PROGRAM

## 2025-02-04 PROCEDURE — 250N000011 HC RX IP 250 OP 636: Performed by: STUDENT IN AN ORGANIZED HEALTH CARE EDUCATION/TRAINING PROGRAM

## 2025-02-04 PROCEDURE — 120N000001 HC R&B MED SURG/OB

## 2025-02-04 PROCEDURE — 250N000011 HC RX IP 250 OP 636: Performed by: EMERGENCY MEDICINE

## 2025-02-04 PROCEDURE — 99232 SBSQ HOSP IP/OBS MODERATE 35: CPT | Performed by: STUDENT IN AN ORGANIZED HEALTH CARE EDUCATION/TRAINING PROGRAM

## 2025-02-04 PROCEDURE — 80048 BASIC METABOLIC PNL TOTAL CA: CPT

## 2025-02-04 PROCEDURE — 85014 HEMATOCRIT: CPT

## 2025-02-04 RX ORDER — ONDANSETRON 4 MG/1
4 TABLET, ORALLY DISINTEGRATING ORAL EVERY 6 HOURS PRN
Status: DISCONTINUED | OUTPATIENT
Start: 2025-02-04 | End: 2025-02-06 | Stop reason: HOSPADM

## 2025-02-04 RX ORDER — AMOXICILLIN 250 MG
2 CAPSULE ORAL 2 TIMES DAILY PRN
Status: DISCONTINUED | OUTPATIENT
Start: 2025-02-04 | End: 2025-02-06 | Stop reason: HOSPADM

## 2025-02-04 RX ORDER — LIDOCAINE 40 MG/G
CREAM TOPICAL
Status: DISCONTINUED | OUTPATIENT
Start: 2025-02-04 | End: 2025-02-06 | Stop reason: HOSPADM

## 2025-02-04 RX ORDER — AMOXICILLIN 250 MG
1 CAPSULE ORAL 2 TIMES DAILY PRN
Status: DISCONTINUED | OUTPATIENT
Start: 2025-02-04 | End: 2025-02-06 | Stop reason: HOSPADM

## 2025-02-04 RX ORDER — CITALOPRAM HYDROBROMIDE 20 MG/1
20 TABLET ORAL EVERY EVENING
Status: DISCONTINUED | OUTPATIENT
Start: 2025-02-04 | End: 2025-02-06 | Stop reason: HOSPADM

## 2025-02-04 RX ORDER — NALOXONE HYDROCHLORIDE 0.4 MG/ML
0.4 INJECTION, SOLUTION INTRAMUSCULAR; INTRAVENOUS; SUBCUTANEOUS
Status: DISCONTINUED | OUTPATIENT
Start: 2025-02-04 | End: 2025-02-06 | Stop reason: HOSPADM

## 2025-02-04 RX ORDER — BISACODYL 10 MG
10 SUPPOSITORY, RECTAL RECTAL DAILY PRN
Status: DISCONTINUED | OUTPATIENT
Start: 2025-02-04 | End: 2025-02-06 | Stop reason: HOSPADM

## 2025-02-04 RX ORDER — NALOXONE HYDROCHLORIDE 0.4 MG/ML
0.2 INJECTION, SOLUTION INTRAMUSCULAR; INTRAVENOUS; SUBCUTANEOUS
Status: DISCONTINUED | OUTPATIENT
Start: 2025-02-04 | End: 2025-02-06 | Stop reason: HOSPADM

## 2025-02-04 RX ORDER — BENZONATATE 100 MG/1
100 CAPSULE ORAL EVERY 8 HOURS PRN
Status: DISCONTINUED | OUTPATIENT
Start: 2025-02-04 | End: 2025-02-06 | Stop reason: HOSPADM

## 2025-02-04 RX ORDER — OXYCODONE HYDROCHLORIDE 5 MG/1
5 TABLET ORAL EVERY 4 HOURS PRN
Status: DISCONTINUED | OUTPATIENT
Start: 2025-02-04 | End: 2025-02-06 | Stop reason: HOSPADM

## 2025-02-04 RX ORDER — LOSARTAN POTASSIUM 25 MG/1
25 TABLET ORAL DAILY
Status: DISCONTINUED | OUTPATIENT
Start: 2025-02-04 | End: 2025-02-06 | Stop reason: HOSPADM

## 2025-02-04 RX ORDER — GUAIFENESIN 600 MG/1
1200 TABLET, EXTENDED RELEASE ORAL 2 TIMES DAILY
Status: DISCONTINUED | OUTPATIENT
Start: 2025-02-04 | End: 2025-02-06 | Stop reason: HOSPADM

## 2025-02-04 RX ORDER — POLYETHYLENE GLYCOL 3350 17 G/17G
17 POWDER, FOR SOLUTION ORAL DAILY PRN
Status: DISCONTINUED | OUTPATIENT
Start: 2025-02-04 | End: 2025-02-06 | Stop reason: HOSPADM

## 2025-02-04 RX ORDER — CALCIUM CARBONATE 500 MG/1
1000 TABLET, CHEWABLE ORAL 4 TIMES DAILY PRN
Status: DISCONTINUED | OUTPATIENT
Start: 2025-02-04 | End: 2025-02-04

## 2025-02-04 RX ORDER — ONDANSETRON 2 MG/ML
4 INJECTION INTRAMUSCULAR; INTRAVENOUS EVERY 6 HOURS PRN
Status: DISCONTINUED | OUTPATIENT
Start: 2025-02-04 | End: 2025-02-06 | Stop reason: HOSPADM

## 2025-02-04 RX ORDER — HYOSCYAMINE SULFATE 0.12 MG/1
125 TABLET SUBLINGUAL EVERY 4 HOURS PRN
Status: DISCONTINUED | OUTPATIENT
Start: 2025-02-04 | End: 2025-02-06 | Stop reason: HOSPADM

## 2025-02-04 RX ORDER — CALCIUM CARBONATE 500 MG/1
1000 TABLET, CHEWABLE ORAL 4 TIMES DAILY PRN
Status: DISCONTINUED | OUTPATIENT
Start: 2025-02-04 | End: 2025-02-06 | Stop reason: HOSPADM

## 2025-02-04 RX ORDER — ACETAMINOPHEN 325 MG/1
650 TABLET ORAL EVERY 4 HOURS PRN
Status: DISCONTINUED | OUTPATIENT
Start: 2025-02-04 | End: 2025-02-06 | Stop reason: HOSPADM

## 2025-02-04 RX ORDER — ACETAMINOPHEN 325 MG/1
650 TABLET ORAL EVERY 4 HOURS PRN
Status: DISCONTINUED | OUTPATIENT
Start: 2025-02-04 | End: 2025-02-04

## 2025-02-04 RX ORDER — HALOPERIDOL 5 MG/ML
2 INJECTION INTRAMUSCULAR EVERY 6 HOURS PRN
Status: DISCONTINUED | OUTPATIENT
Start: 2025-02-04 | End: 2025-02-06 | Stop reason: HOSPADM

## 2025-02-04 RX ORDER — ALBUTEROL SULFATE 90 UG/1
2 INHALANT RESPIRATORY (INHALATION) EVERY 6 HOURS PRN
Status: DISCONTINUED | OUTPATIENT
Start: 2025-02-04 | End: 2025-02-06 | Stop reason: HOSPADM

## 2025-02-04 RX ORDER — LORAZEPAM 0.5 MG/1
0.5 TABLET ORAL
Status: DISCONTINUED | OUTPATIENT
Start: 2025-02-04 | End: 2025-02-06 | Stop reason: HOSPADM

## 2025-02-04 RX ADMIN — CITALOPRAM HYDROBROMIDE 20 MG: 20 TABLET ORAL at 18:47

## 2025-02-04 RX ADMIN — AZITHROMYCIN MONOHYDRATE 500 MG: 500 INJECTION, POWDER, LYOPHILIZED, FOR SOLUTION INTRAVENOUS at 21:42

## 2025-02-04 RX ADMIN — LOSARTAN POTASSIUM 25 MG: 25 TABLET, FILM COATED ORAL at 08:55

## 2025-02-04 RX ADMIN — CEFTRIAXONE SODIUM 2 G: 2 INJECTION, POWDER, FOR SOLUTION INTRAMUSCULAR; INTRAVENOUS at 20:29

## 2025-02-04 RX ADMIN — GUAIFENESIN 1200 MG: 600 TABLET ORAL at 20:29

## 2025-02-04 ASSESSMENT — ACTIVITIES OF DAILY LIVING (ADL)
ADLS_ACUITY_SCORE: 77
NUMBER_OF_TIMES_PATIENT_HAS_FALLEN_WITHIN_LAST_SIX_MONTHS: 1
ADLS_ACUITY_SCORE: 71
ADLS_ACUITY_SCORE: 77
TOILETING_ASSISTANCE: TOILETING DIFFICULTY, ASSISTANCE 1 PERSON
EQUIPMENT_CURRENTLY_USED_AT_HOME: HOSPITAL BED
ADLS_ACUITY_SCORE: 71
ADLS_ACUITY_SCORE: 71
ADLS_ACUITY_SCORE: 77
ADLS_ACUITY_SCORE: 71
WALKING_OR_CLIMBING_STAIRS_DIFFICULTY: YES
ADLS_ACUITY_SCORE: 77
DRESSING/BATHING: DRESSING DIFFICULTY, ASSISTANCE 1 PERSON
ADLS_ACUITY_SCORE: 83
ADLS_ACUITY_SCORE: 77
TOILETING_ISSUES: YES
ADLS_ACUITY_SCORE: 71
CHANGE_IN_FUNCTIONAL_STATUS_SINCE_ONSET_OF_CURRENT_ILLNESS/INJURY: YES
ADLS_ACUITY_SCORE: 71
TOILETING: 2-->COMPLETELY DEPENDENT (NOT DEVELOPMENTALLY APPROPRIATE)
ADLS_ACUITY_SCORE: 77
CONCENTRATING,_REMEMBERING_OR_MAKING_DECISIONS_DIFFICULTY: YES
DRESSING/BATHING_DIFFICULTY: YES
ADLS_ACUITY_SCORE: 71
ADLS_ACUITY_SCORE: 77
DOING_ERRANDS_INDEPENDENTLY_DIFFICULTY: YES
WALKING_OR_CLIMBING_STAIRS: AMBULATION DIFFICULTY, REQUIRES EQUIPMENT
ADLS_ACUITY_SCORE: 77
FALL_HISTORY_WITHIN_LAST_SIX_MONTHS: YES
ADLS_ACUITY_SCORE: 77
ADLS_ACUITY_SCORE: 71
WEAR_GLASSES_OR_BLIND: NO
DIFFICULTY_EATING/SWALLOWING: NO
ADLS_ACUITY_SCORE: 71
TOILETING: 2-->COMPLETELY DEPENDENT

## 2025-02-04 NOTE — PLAN OF CARE
"Goal Outcome Evaluation:           Overall Patient Progress: improvingOverall Patient Progress: improving    Outcome Evaluation: Pt alert. Appears oriented to self. Due to orientation, unable to fully assess for admission.  Pleasant, nods and smiles to questions but does not logically answer them. Pt situated to floor.  Check and change. 2 L O2 NC to maintain sat.    Visit Vitals  BP (!) 158/81   Pulse 93   Temp 98.2  F (36.8  C) (Oral)   Resp 18       Problem: Adult Inpatient Plan of Care  Goal: Plan of Care Review  Description: The Plan of Care Review/Shift note should be completed every shift.  The Outcome Evaluation is a brief statement about your assessment that the patient is improving, declining, or no change.  This information will be displayed automatically on your shift  note.  Outcome: Progressing  Flowsheets (Taken 2/4/2025 0120)  Outcome Evaluation: Pt alert. Appears oriented to self. Due to orientation, unable to fully assess for admission.  Pleasant, nods and smiles to questions but does not logically answer them. Pt situated to floor.  Check and change. 2 L O2 NC to maintain sat.  Overall Patient Progress: improving  Goal: Patient-Specific Goal (Individualized)  Description: You can add care plan individualizations to a care plan. Examples of Individualization might be:  \"Parent requests to be called daily at 9am for status\", \"I have a hard time hearing out of my right ear\", or \"Do not touch me to wake me up as it startles  me\".  Outcome: Progressing  Goal: Absence of Hospital-Acquired Illness or Injury  Outcome: Progressing  Intervention: Identify and Manage Fall Risk  Recent Flowsheet Documentation  Taken 2/4/2025 0034 by Dimple Cruz RN  Safety Promotion/Fall Prevention:   activity supervised   assistive device/personal items within reach   clutter free environment maintained   lighting adjusted   nonskid shoes/slippers when out of bed   safety round/check completed  Intervention: " Prevent Skin Injury  Recent Flowsheet Documentation  Taken 2/4/2025 0015 by Dimple Cruz, RN  Body Position:   supine, legs elevated   supine, head elevated  Intervention: Prevent Infection  Recent Flowsheet Documentation  Taken 2/4/2025 0034 by Dimple Cruz, RN  Infection Prevention:   single patient room provided   hand hygiene promoted   equipment surfaces disinfected  Goal: Optimal Comfort and Wellbeing  Outcome: Progressing  Goal: Readiness for Transition of Care  Outcome: Progressing

## 2025-02-04 NOTE — PROGRESS NOTES
[facility] ADMISSION NOTE    Patient admitted to room 2404 at approximately *** via {TRANSPORT MODE LIST:166290} from {NO SHOW NOTIFICATION:245289}. Patient was accompanied by {Family Member:666006}.     Verbal SBAR report received from Marce CHICAS RN prior to patient arrival.     {Patient ambulated/transferred:588822} Patient alert and oriented X {NUMBER 1-3:046865}. {Pain control:042944}  . Admission vital signs: Blood pressure (!) 159/79, pulse 79, temperature 98.5  F (36.9  C), temperature source Axillary, resp. rate 15, SpO2 (!) 91%, not currently breastfeeding. {PATIENT, FAMILY MEMBER, CAREGIVER:382275} {was/were:706824} oriented to plan of care, {:079652}.     Risk Assessment    The following safety risks were identified during admission: {safety risks:467054}. Yellow risk band applied: {yes/no:904210}.     Skin Initial Assessment    This writer admitted this patient and completed a full skin assessment and Arnaldo score in the Adult PCS flowsheet.   Photo documentation of skin problem and/or wound competed via [employer] application (located under Media):  {Yes No NA:252716}    Appropriate interventions initiated as needed.     Secondary skin check completed by ***.         Education    Patient has a [place] to Observation order: {YES-NO  Default Yes:4444}  Observation education completed and documented: {Yes No NA:091806}      Dimple Abdi RN

## 2025-02-04 NOTE — PROGRESS NOTES
Care Transitions Dept    CM referral to assist with discharge planning and services       Patient is a Memory Care resident at the [facility]     Son Otoniel # [phone] is the primary Health Care Agent.     Pt is primarily wheelchair bound. Has had the ability to stand with minimal steps for staff if needed.   No O2 at baseline.       Anticipated Discharge plan: Return back to [facility] on discharge   CM to complete full assessment and assist with facilitating transfer when medically stable         [facility]   Phone: [phone]   Fax: [phone]   [facility] fax [phone]        TORREY Glez  [facility] [phone]   [facility]  [phone]

## 2025-02-04 NOTE — H&P
[facility]    History and Physical - Hospitalist Service       Date of Admission:  2/3/2025    Assessment & Plan    Gabriela Agrawal is a 89 year old female with past medical hx of dementia, HTN, HLD admitted on 2/3/2025. [facility].    Acute respiratory failure with hypoxia  Community acquired pneumonia of RLL  [facility]ed [facility] for [facility]. Patient had a CXR at outside hospital showing possible RLL pneumonia. CT showed acute RLL pneumonia. Afebrile, not tachycardic, no leukocytosis. Admitted on 2L NC.    - Ceftriaxone 2g IV daily  - Azithromycin IV daily  - PRN O2, wean as tolerated  - SW consulted for transition back to facility    UTI with hematuria  UA with trace LE, many bacteria, 56 WBC.  - Urine culture pending  - Abx as above    Senile dementia without behavioral disturbance  Unknown baseline. Appears to be oriented to self only on admission. Previously on Donepezil, but has been discontinued recently for unknown reason.  - Delirium precautions    Essential hypertension, benign  - Continued PTA Losartan 25mg     Adrenal mass, chronic  CT read as relatively stable or slow-growing 5.8cm left adrenal mass which may represent adrenal adenoma with additional myelolipoma. There can be an increased risk of spontaneous hemorrhage with large adrenal masses.  - Consider outpatient consultation    Depression  - Continued PTA Citalopram 20mg, Ativan 0.5mg at bedtime             Diet: Combination Diet Regular Diet Adult  DVT Prophylaxis: Ambulate every shift. PAUDA score 3, which is borderline low risk. Avoiding Lovenox due to large adrenal mass and increased risk of spontaneous hemorrhage.  Rico Catheter: Not present  Lines: None     Cardiac Monitoring: None  Code Status: No CPR- Do NOT Intubate    Clinically Significant Risk Factors Present on Admission                   # Hypertension: Noted on problem list     # Dementia: noted on problem  list                  Disposition Plan     Medically Ready for Discharge: Anticipated in 2-4 Days         The patient's care was discussed with the Attending Physician, Dr. Corona and Patient.    Gerald Ervin PA-C  Hospitalist Service  [facility]  Securely message with [employer] (more info)  Text page via [facility] Paging/Directory     ______________________________________________________________________    Chief Complaint   Hypoxia    History is obtained from the patient    History of Present Illness   Gabriela Agrawal is a 89 year old female who presented for hypoxia.    Patient presents from [facility] for report of a possible pneumonia.  There was a chest x-ray done at outside hospital it is not able to be seen from our records and showed a possible RLL pneumonia.  She has a history of dementia and appears to be oriented to self only.    Past Medical History    Past Medical History:   Diagnosis Date    Basal cell carcinoma     Bronchiolitis obliterans (H)     Diffuse cystic mastopathy     Bresat Fibrocystic Disease    Disorder of bone and cartilage, unspecified     Malignant melanoma (H)     Nonspecific (abnormal) findings on radiological and other examination of lung field     Other abnormal blood chemistry     Other specified alveolar and parietoalveolar pneumonopathies     Squamous cell carcinoma     Syncope and collapse     2007 holter neg       Past Surgical History   Past Surgical History:   Procedure Laterality Date    APPENDECTOMY OPEN      BREAST SURGERY      biopsy    COLONOSCOPY  2/8/2013    Procedure: COLONOSCOPY;  Colonoscopy  ;  Surgeon: Norah Whaley MD;  Location: [facility]    EYE SURGERY      keratoplasty    HERNIA REPAIR      left inginal    HYSTERECTOMY      including ovaries    KNEE SURGERY      2010, 2009    LUNG SURGERY      biopsy    melanoma  resection         Prior to Admission Medications   Prior to Admission Medications   Prescriptions Last Dose Informant  Patient Reported? Taking?   LORazepam (ATIVAN) 0.5 MG tablet 2/2/2025 at  8:00 PM  Yes Yes   Sig: Take 0.5 mg by mouth at bedtime.   VENTOLIN  (90 Base) MCG/ACT inhaler 2/3/2025 at  8:43 AM  Yes Yes   Sig: Inhale 2 puffs into the lungs every 4 hours as needed for shortness of breath.   acetaminophen (TYLENOL) 500 MG tablet 2/3/2025 at  2:00 PM Nursing Home Yes Yes   Sig: Take 1,000 mg by mouth 2 times daily   acetaminophen (TYLENOL) 650 MG suppository Unknown  Yes Yes   Sig: Place 650 mg rectally every 4 hours as needed for fever or mild pain.   amoxicillin-clavulanate (AUGMENTIN) 500-125 MG tablet   Yes Yes   Sig: Take 1 tablet by mouth 3 times daily. For 5 days   azithromycin (ZITHROMAX) 250 MG tablet   Yes Yes   Sig: Take 500 mg by mouth once. On Day 1, then take 1 tab daily on days 2-5   benzonatate (TESSALON) 100 MG capsule 2/3/2025 at  8:43 AM  Yes Yes   Sig: Take 100 mg by mouth every 8 hours as needed for cough.   bisacodyl (DULCOLAX) 10 MG suppository Unknown  Yes Yes   Sig: Place 10 mg rectally daily as needed for constipation.   citalopram (CELEXA) 20 MG tablet 2/2/2025 at  8:00 PM Nursing Home Yes Yes   Sig: Take 20 mg by mouth every evening   hyoscyamine (LEVSIN/SL) 0.125 MG sublingual tablet Unknown  Yes Yes   Sig: Place 0.125 mg under the tongue every 4 hours as needed (excessive oral secretions).   losartan (COZAAR) 25 MG tablet 2/3/2025 Morning  Yes Yes   Sig: Take 25 mg by mouth daily.   oxyCODONE (ROXICODONE) 5 MG tablet Unknown  Yes Yes   Sig: Take 2.5 mg by mouth 2 times daily as needed for pain.   polyethylene glycol (MIRALAX) 17 GM/Dose powder 2/3/2025 Morning  Yes Yes   Sig: Take 1 Capful by mouth daily.   polyethylene glycol (MIRALAX) 17 GM/Dose powder Unknown  Yes Yes   Sig: Take 1 Capful by mouth daily as needed for constipation.      Facility-Administered Medications: None      2023 UPDATE: these sections are not required for  billing, but can be added when medically relevant      Physical Exam   Vital Signs: Temp: 98.5  F (36.9  C) Temp src: Axillary BP: (!) 159/79 Pulse: 79   Resp: 15 SpO2: 93 % O2 Device: Nasal cannula Oxygen Delivery: 2 LPM  Weight: 0 lbs 0 oz    Constitutional: Alert, cooperative, in no acute distress, appears nontoxic.  HENT: Oropharynx is clear. No evidence of cranial trauma. Normocephalic. Eyes anicteric.   Cardiovascular: Regular rate and rhythm, normal S1 and S2, and no murmur noted. No lower extremity edema.  Respiratory: Clear to auscultation bilaterally with no adventitious breath sounds.   Musculoskeletal: Normal muscle bulk and tone. FROM in all extremities   Skin: Warm and dry, no rashes on exposed skin.   Psych: Normal affect and speech.  Neurologic: Cranial nerves 2-12 are grossly intact. Appears to be oriented to self only.      Medical Decision Making       65 MINUTES SPENT BY ME on the date of service doing chart review, history, exam, documentation & further activities per the note.      Data     I have personally reviewed the following data over the past 24 hrs:    10.9  \   12.6   / 307     137 102 20.3 /  96   4.7 27 0.77 \     ALT: 8 AST: 14 AP: 80 TBILI: 0.2   ALB: 3.6 TOT PROTEIN: 6.8 LIPASE: N/A     Procal: 0.09 CRP: N/A Lactic Acid: N/A         Imaging results reviewed over the past 24 hrs:   Recent Results (from the past 24 hours)   CT Chest w Contrast   Result Value    Radiologist flags (Urgent)     Probably benign 5.8 cm left adrenal mass may benefit from outpatient specialist consultation if not previously performed.    Narrative    EXAM: CT CHEST W CONTRAST  LOCATION: [facility]  DATE: 2/3/2025    INDICATION: Hypoxia, hypotension, cough, new oxygen requirement. Advanced age, Hx of Dementia. Concern for pneumonia. Evaluate for acute cardiopulmonary process  COMPARISON: 04/19/2021  TECHNIQUE: CT chest with IV contrast. Multiplanar reformats were obtained. Dose reduction techniques were used. Motion artifact  limits evaluation.    CONTRAST: 79 ml Isovue 370    FINDINGS:   LUNGS AND PLEURA: Scattered linear atelectasis in the lungs. There are few scattered solid noncalcified and calcified probable granulomas in the lungs. Nodules measure up to 1.0 cm in the left lower lobe series 4, image 199. There is mild to moderate   consolidation in the right lower lobe. This is somewhat peripheral. No pleural effusions or pneumothorax.    MEDIASTINUM/AXILLAE: Small low-density nodules in the thyroid gland. No thoracic adenopathy. No   cardiomegaly. No pericardial effusion. Normal caliber thoracic aorta. Aortic atherosclerosis. No acute abnormality. Although not a dedicated PE study, no gross PE.    CORONARY ARTERY CALCIFICATION: Mild.    UPPER ABDOMEN: There are low-density lesions in the liver, measuring up to 4.6 cm in size. There is scattered renal cortical thinning and low-density lesions in the kidneys, measuring up to 3.2 cm in size. In the left adrenal gland, there is a 5.8 x 3.5   cm soft tissue mass with a central portion that measures 2.8 x 3.0 cm and is fat density. No calcification. This has been present since 2010, slightly smaller at that time.    MUSCULOSKELETAL: Normal.      Impression    IMPRESSION:   1.  Findings consistent with acute right lower lobe pneumonia. Please note that this is not a PE study. If there is clinical concern for PE and pulmonary infarction, then PE study is recommended.    2.  There are bilateral pulmonary nodules. Some are noncalcified and measure up to 1.0 cm in the left lower lobe. These are similar to priors, including in 2010. There are likely benign, and do not require additional follow-up imaging.    3.  Relatively stable or slow growing 5.8 cm left adrenal mass, which may represent an adrenal adenoma, with an additional myelolipoma. There can be an increased risk of spontaneous hemorrhage with large adrenal masses. Outpatient specialist consultation   is recommended if not previously  performed.    4.  Probable cysts in the liver and kidneys do not require additional follow-up imaging.      [Access Center: Probably benign 5.8 cm left adrenal mass may benefit from outpatient specialist consultation if not previously performed.]    This report will be copied to the [facility] to ensure a provider acknowledges the finding. [facility] is available Monday through Friday 8am-3:30 pm.

## 2025-02-04 NOTE — MEDICATION SCRIBE - ADMISSION MEDICATION HISTORY
Medication Scribe Admission Medication History    Admission medication history is complete. The information provided in this note is only as accurate as the sources available at the time of the update.    Information Source(s): Facility (TCU/NH/) medication list/MAR via with NH MAR from [facility]    Pertinent Information: Multiple additions and removal of medicines.    Changes made to PTA medication list:  Added:   Acetaminophen 650 mg suppository  Augmentin  Azithromycin 250 mg  Benzonatate 100 mg  Bisacodyl Suppository  Hyoscyamine 0.125 mg  Lorazepam 0.5 mg  Losartan 25 mg  Oxycodone 5 mg  Miralax scheduled and prn  Ventolin/Albuterol Inhaler    Deleted:  Calcium + D  C[employer] Senior  Donepezil 10 mg  Losartan 50 mg  Vitamin D 25 mcg  Vitamin E 900 mg    Changed: None    Allergies reviewed with patient's MAR and updates made in EHR: yes, no change.    Medication History Completed By: Areli Hernández 2/3/2025 8:58 PM    PTA Med List   Medication Sig Note Last Dose/Taking    acetaminophen (TYLENOL) 500 MG tablet Take 1,000 mg by mouth 2 times daily  2/3/2025 at  2:00 PM    acetaminophen (TYLENOL) 650 MG suppository Place 650 mg rectally every 4 hours as needed for fever or mild pain.  Unknown    amoxicillin-clavulanate (AUGMENTIN) 500-125 MG tablet Take 1 tablet by mouth 3 times daily. For 5 days 2/3/2025: New med, not started yet Taking    azithromycin (ZITHROMAX) 250 MG tablet Take 500 mg by mouth once. On Day 1, then take 1 tab daily on days 2-5 2/3/2025: New med, not started yet. Taking    benzonatate (TESSALON) 100 MG capsule Take 100 mg by mouth every 8 hours as needed for cough.  2/3/2025 at  8:43 AM    bisacodyl (DULCOLAX) 10 MG suppository Place 10 mg rectally daily as needed for constipation.  Unknown    citalopram (CELEXA) 20 MG tablet Take 20 mg by mouth every evening  2/2/2025 at  8:00 PM    hyoscyamine (LEVSIN/SL) 0.125 MG sublingual tablet Place 0.125 mg under the tongue every 4 hours as needed  (excessive oral secretions).  Unknown    LORazepam (ATIVAN) 0.5 MG tablet Take 0.5 mg by mouth at bedtime.  2/2/2025 at  8:00 PM    losartan (COZAAR) 25 MG tablet Take 25 mg by mouth daily.  2/3/2025 Morning    oxyCODONE (ROXICODONE) 5 MG tablet Take 2.5 mg by mouth 2 times daily as needed for pain.  Unknown    polyethylene glycol (MIRALAX) 17 GM/Dose powder Take 1 Capful by mouth daily.  2/3/2025 Morning    polyethylene glycol (MIRALAX) 17 GM/Dose powder Take 1 Capful by mouth daily as needed for constipation.  Unknown    VENTOLIN  (90 Base) MCG/ACT inhaler Inhale 2 puffs into the lungs every 4 hours as needed for shortness of breath.  2/3/2025 at  8:43 AM

## 2025-02-04 NOTE — PROGRESS NOTES
[facility]    Medicine Progress Note - Hospitalist Service    Date of Admission:  2/3/2025    Assessment & Plan    Gabriela Agrawal is a 89 year old female with past medical hx of dementia, HTN, HLD admitted on 2/3/2025. [facility].    Acute respiratory failure with hypoxia  Community acquired pneumonia of RLL  [facility]. Patient had a CXR at outside hospital showing possible RLL pneumonia. CT showed acute RLL pneumonia. Afebrile, not tachycardic, no leukocytosis. Admitted on 2L NC.    - Ceftriaxone 2g IV daily  - Azithromycin IV daily  - Mucinex 1200mg BID  - Abuterol HFA inh q6h prn  - PRN O2, wean as tolerated  - SW consulted for transition back to facility    UTI with hematuria  UA with trace LE, many bacteria, 56 WBC.  - Urine culture pending  - Abx as above    Senile dementia without behavioral disturbance  Unknown baseline. Appears to be oriented to self only on admission. Previously on Donepezil, but has been discontinued recently for unknown reason.  - Delirium precautions    Essential hypertension, benign  - Continued PTA Losartan 25mg     Adrenal mass, chronic  CT read as relatively stable or slow-growing 5.8cm left adrenal mass which may represent adrenal adenoma with additional myelolipoma. There can be an increased risk of spontaneous hemorrhage with large adrenal masses.  - Consider outpatient consultation    Depression  - Continued PTA Citalopram 20mg, Ativan 0.5mg at bedtime             Diet: Combination Diet Regular Diet Adult    DVT Prophylaxis: Ambulate every shift  Rico Catheter: Not present  Lines: None     Cardiac Monitoring: None  Code Status: No CPR- Do NOT Intubate      Clinically Significant Risk Factors Present on Admission                   # Hypertension: Noted on problem list     # Dementia: noted on problem list                  Social Drivers of Health    Tobacco Use: Medium Risk (5/30/2023)     Patient History     Smoking Tobacco Use: Former     Smokeless Tobacco Use: Never   Interpersonal Safety: Unknown (2/4/2025)    Interpersonal Safety     Do you feel physically and emotionally safe where you currently live?: Patient unable to answer     Within the past 12 months, have you been hit, slapped, kicked or otherwise physically hurt by someone?: Patient unable to answer     Within the past 12 months, have you been humiliated or emotionally abused in other ways by your partner or ex-partner?: Patient unable to answer          Disposition Plan     Medically Ready for Discharge: Anticipated Tomorrow  Intermittent O2 needed today and has diffuse end exp wheezes, likely 1-2 days.           Jeffry Reynoso DO  Hospitalist Service  [facility]  Securely message with [employer] (more info)  Text page via [employer] Paging/Directory   ______________________________________________________________________    Interval History   NAEO. Intermittent O2 needed today and has diffuse end exp wheezes, likely 1-2 days.    Physical Exam   Vital Signs: Temp: 99.2  F (37.3  C) Temp src: Axillary BP: (!) 160/66 Pulse: 81   Resp: 22 SpO2: 92 % O2 Device: None (Room air) Oxygen Delivery: 2 LPM  Weight: 0 lbs 0 oz    Physical Exam  Constitutional:       General: Pt is not in acute distress.  HENT:      Head: Normocephalic and atraumatic.      Nose: Nose normal.   Eyes:      Conjunctiva/sclera: Conjunctivae normal.   Pulmonary:      Effort: Pulmonary effort is normal. Diffuse end exp wheezes  Abdominal:      General: Abdomen is flat.   Skin:     Findings: No rash.   Neurological:      General: No focal deficit present.      Mental Status: Pt is alert.   Psychiatric:         Mood and Affect: Mood normal.       Medical Decision Making       38 MINUTES SPENT BY ME on the date of service doing chart review, history, exam, documentation & further activities per the note.      Data     I have personally reviewed the  following data over the past 24 hrs:    10.6  \   11.9   / 295     141 106 14.1 /  100 (H)   4.3 23 0.63 \     ALT: 8 AST: 14 AP: 80 TBILI: 0.2   ALB: 3.6 TOT PROTEIN: 6.8 LIPASE: N/A     Procal: 0.09 CRP: N/A Lactic Acid: N/A         Imaging results reviewed over the past 24 hrs:   Recent Results (from the past 24 hours)   CT Chest w Contrast   Result Value    Radiologist flags (Urgent)     Probably benign 5.8 cm left adrenal mass may benefit from outpatient specialist consultation if not previously performed.    Narrative    EXAM: CT CHEST W CONTRAST  LOCATION: [facility]  DATE: 2/3/2025    INDICATION: Hypoxia, hypotension, cough, new oxygen requirement. Advanced age, Hx of Dementia. Concern for pneumonia. Evaluate for acute cardiopulmonary process  COMPARISON: 04/19/2021  TECHNIQUE: CT chest with IV contrast. Multiplanar reformats were obtained. Dose reduction techniques were used. Motion artifact limits evaluation.    CONTRAST: 79 ml Isovue 370    FINDINGS:   LUNGS AND PLEURA: Scattered linear atelectasis in the lungs. There are few scattered solid noncalcified and calcified probable granulomas in the lungs. Nodules measure up to 1.0 cm in the left lower lobe series 4, image 199. There is mild to moderate   consolidation in the right lower lobe. This is somewhat peripheral. No pleural effusions or pneumothorax.    MEDIASTINUM/AXILLAE: Small low-density nodules in the thyroid gland. No thoracic adenopathy. No   cardiomegaly. No pericardial effusion. Normal caliber thoracic aorta. Aortic atherosclerosis. No acute abnormality. Although not a dedicated PE study, no gross PE.    CORONARY ARTERY CALCIFICATION: Mild.    UPPER ABDOMEN: There are low-density lesions in the liver, measuring up to 4.6 cm in size. There is scattered renal cortical thinning and low-density lesions in the kidneys, measuring up to 3.2 cm in size. In the left adrenal gland, there is a 5.8 x 3.5   cm soft tissue mass  with a central portion that measures 2.8 x 3.0 cm and is fat density. No calcification. This has been present since 2010, slightly smaller at that time.    MUSCULOSKELETAL: Normal.      Impression    IMPRESSION:   1.  Findings consistent with acute right lower lobe pneumonia. Please note that this is not a PE study. If there is clinical concern for PE and pulmonary infarction, then PE study is recommended.    2.  There are bilateral pulmonary nodules. Some are noncalcified and measure up to 1.0 cm in the left lower lobe. These are similar to priors, including in 2010. There are likely benign, and do not require additional follow-up imaging.    3.  Relatively stable or slow growing 5.8 cm left adrenal mass, which may represent an adrenal adenoma, with an additional myelolipoma. There can be an increased risk of spontaneous hemorrhage with large adrenal masses. Outpatient specialist consultation   is recommended if not previously performed.    4.  Probable cysts in the liver and kidneys do not require additional follow-up imaging.      [Access Center: Probably benign 5.8 cm left adrenal mass may benefit from outpatient specialist consultation if not previously performed.]    This report will be copied to the [facility] to ensure a provider acknowledges the finding. Access Center is available Monday through Friday 8am-3:30 pm.

## 2025-02-05 PROCEDURE — 250N000013 HC RX MED GY IP 250 OP 250 PS 637: Performed by: STUDENT IN AN ORGANIZED HEALTH CARE EDUCATION/TRAINING PROGRAM

## 2025-02-05 PROCEDURE — 250N000011 HC RX IP 250 OP 636: Performed by: STUDENT IN AN ORGANIZED HEALTH CARE EDUCATION/TRAINING PROGRAM

## 2025-02-05 PROCEDURE — 250N000013 HC RX MED GY IP 250 OP 250 PS 637

## 2025-02-05 PROCEDURE — 99232 SBSQ HOSP IP/OBS MODERATE 35: CPT | Performed by: STUDENT IN AN ORGANIZED HEALTH CARE EDUCATION/TRAINING PROGRAM

## 2025-02-05 PROCEDURE — 120N000001 HC R&B MED SURG/OB

## 2025-02-05 PROCEDURE — 258N000003 HC RX IP 258 OP 636: Performed by: STUDENT IN AN ORGANIZED HEALTH CARE EDUCATION/TRAINING PROGRAM

## 2025-02-05 RX ORDER — AMOXICILLIN AND CLAVULANATE POTASSIUM 500; 125 MG/1; MG/1
1 TABLET, FILM COATED ORAL 2 TIMES DAILY
Qty: 8 TABLET | Refills: 0 | Status: SHIPPED | OUTPATIENT
Start: 2025-02-05 | End: 2025-02-05

## 2025-02-05 RX ORDER — CEFTRIAXONE 2 G/1
2 INJECTION, POWDER, FOR SOLUTION INTRAMUSCULAR; INTRAVENOUS EVERY 24 HOURS
Status: COMPLETED | OUTPATIENT
Start: 2025-02-05 | End: 2025-02-05

## 2025-02-05 RX ORDER — AMOXICILLIN AND CLAVULANATE POTASSIUM 500; 125 MG/1; MG/1
1 TABLET, FILM COATED ORAL 2 TIMES DAILY
Qty: 8 TABLET | Refills: 0 | Status: SHIPPED | OUTPATIENT
Start: 2025-02-05 | End: 2025-02-06

## 2025-02-05 RX ORDER — AZITHROMYCIN 250 MG/1
500 TABLET, FILM COATED ORAL DAILY
Status: DISCONTINUED | OUTPATIENT
Start: 2025-02-05 | End: 2025-02-06 | Stop reason: HOSPADM

## 2025-02-05 RX ADMIN — LOSARTAN POTASSIUM 25 MG: 25 TABLET, FILM COATED ORAL at 08:32

## 2025-02-05 RX ADMIN — GUAIFENESIN 1200 MG: 600 TABLET ORAL at 20:43

## 2025-02-05 RX ADMIN — CEFTRIAXONE 2 G: 2 INJECTION, POWDER, FOR SOLUTION INTRAMUSCULAR; INTRAVENOUS at 20:42

## 2025-02-05 RX ADMIN — AZITHROMYCIN DIHYDRATE 500 MG: 250 TABLET ORAL at 20:43

## 2025-02-05 RX ADMIN — CITALOPRAM HYDROBROMIDE 20 MG: 20 TABLET ORAL at 17:00

## 2025-02-05 RX ADMIN — AZITHROMYCIN MONOHYDRATE 500 MG: 500 INJECTION, POWDER, LYOPHILIZED, FOR SOLUTION INTRAVENOUS at 16:52

## 2025-02-05 RX ADMIN — GUAIFENESIN 1200 MG: 600 TABLET ORAL at 08:32

## 2025-02-05 ASSESSMENT — ACTIVITIES OF DAILY LIVING (ADL)
ADLS_ACUITY_SCORE: 84
ADLS_ACUITY_SCORE: 84
ADLS_ACUITY_SCORE: 83
ADLS_ACUITY_SCORE: 86
ADLS_ACUITY_SCORE: 83
ADLS_ACUITY_SCORE: 84
ADLS_ACUITY_SCORE: 88
DEPENDENT_IADLS:: CLEANING;COOKING;LAUNDRY;SHOPPING;MEAL PREPARATION;MEDICATION MANAGEMENT;MONEY MANAGEMENT;TRANSPORTATION;INCONTINENCE
ADLS_ACUITY_SCORE: 88
ADLS_ACUITY_SCORE: 83
ADLS_ACUITY_SCORE: 81
ADLS_ACUITY_SCORE: 83
ADLS_ACUITY_SCORE: 88
ADLS_ACUITY_SCORE: 86
ADLS_ACUITY_SCORE: 86
ADLS_ACUITY_SCORE: 84
ADLS_ACUITY_SCORE: 86
ADLS_ACUITY_SCORE: 88
ADLS_ACUITY_SCORE: 84
ADLS_ACUITY_SCORE: 82
ADLS_ACUITY_SCORE: 88

## 2025-02-05 NOTE — PROGRESS NOTES
[facility]    Medicine Progress Note - Hospitalist Service    Date of Admission:  2/3/2025    Assessment & Plan   Gabriela Agrawal is a 89 year old female with past medical hx of dementia, HTN, HLD admitted on 2/3/2025. [facility]. She is medically ready for discharge back to [facility] as of 2/5 but no transport until 2/6.    Acute respiratory failure with hypoxia  Community acquired pneumonia of RLL  Presented from [facility] for possible pneumonia. Patient had a CXR at outside hospital showing possible RLL pneumonia. CT showed acute RLL pneumonia. Afebrile, not tachycardic, no leukocytosis. Admitted on 2L NC.    - Ceftriaxone 2g IV daily x 3 doses then transition to PO augmentin   - Azithromycin 500mg IV daily x 3 doses total  - Mucinex 1200mg BID  - Abuterol HFA inh q6h prn  - PRN O2, wean as tolerated  - SW consulted for transition back to facility    UTI with hematuria  UA with trace LE, many bacteria, 56 WBC.  - Urine culture pending  - Abx as above    Senile dementia without behavioral disturbance  Unknown baseline. Appears to be oriented to self only on admission. Previously on Donepezil, but has been discontinued recently for unknown reason.  - Delirium precautions    Essential hypertension, benign  - Continued PTA Losartan 25mg     Adrenal mass, chronic  CT read as relatively stable or slow-growing 5.8cm left adrenal mass which may represent adrenal adenoma with additional myelolipoma. There can be an increased risk of spontaneous hemorrhage with large adrenal masses.  - Consider outpatient consultation    Depression  - Continued PTA Citalopram 20mg, Ativan 0.5mg at bedtime             Diet: Combination Diet Regular Diet Adult  Diet    DVT Prophylaxis: Pneumatic Compression Devices  Rico Catheter: Not present  Lines: None     Cardiac Monitoring: None  Code Status: No CPR- Do NOT Intubate      Clinically Significant Risk Factors                   #  Hypertension: Noted on problem list     # Dementia: noted on problem list                   Social Drivers of Health    Tobacco Use: Medium Risk (5/30/2023)    Patient History     Smoking Tobacco Use: Former     Smokeless Tobacco Use: Never   Interpersonal Safety: Unknown (2/4/2025)    Interpersonal Safety     Do you feel physically and emotionally safe where you currently live?: Patient unable to answer     Within the past 12 months, have you been hit, slapped, kicked or otherwise physically hurt by someone?: Patient unable to answer     Within the past 12 months, have you been humiliated or emotionally abused in other ways by your partner or ex-partner?: Patient unable to answer          Disposition Plan     Medically Ready for Discharge: Ready Now             Jeffry Reynoso DO  Hospitalist Service  [facility]  Securely message with [employer] (more info)  Text page via [employer] software Paging/Directory   ______________________________________________________________________    Interval History   NAEO. Med ready. No transport until tomorrow.     Physical Exam   Vital Signs: Temp: 98.4  F (36.9  C) Temp src: Oral BP: 131/59 Pulse: 77   Resp: 16 SpO2: 92 % O2 Device: None (Room air) Oxygen Delivery: 1.5 LPM  Weight: 0 lbs 0 oz    Physical Exam  Constitutional:       General: Pt is not in acute distress.  HENT:      Head: Normocephalic and atraumatic.      Nose: Nose normal.   Eyes:      Conjunctiva/sclera: Conjunctivae normal.   Pulmonary:      Effort: Pulmonary effort is normal.   Abdominal:      General: Abdomen is flat.   Skin:     Findings: No rash.   Neurological:      General: No focal deficit present.      Mental Status: Pt is alert.   Psychiatric:         Mood and Affect: Mood normal.       Medical Decision Making       28 MINUTES SPENT BY ME on the date of service doing chart review, history, exam, documentation & further activities per the note.      Data         Imaging results reviewed over the  past 24 hrs:   No results found for this or any previous visit (from the past 24 hours).

## 2025-02-05 NOTE — DISCHARGE SUMMARY
[facility]  Hospitalist Discharge Summary      Date of Admission:  2/3/2025  Date of Discharge:  2/5/2025  Discharging Provider: Jeffry Reynoso DO  Discharge Service: Hospitalist Service    Discharge Diagnoses   Gabriela Agrawal is a 89 year old female with past medical hx of dementia, HTN, HLD admitted on 2/3/2025. She presented [facility] for possible [facility]. After resolution of hypoxia, patient discharged home on oral antibiotics.    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  The following labs/tests are recommended: Recommend repeat Urine Analysis with Urine Culture in 2 weeks to test for cure (unable to collect clean sample prior to discharge but is on abx), CBC and CMP 1 month.                Unresulted Labs Ordered in the Past 30 Days of this Admission       No orders found from 1/4/2025 to 2/4/2025.        These results will be followed up by n/a    Discharge Disposition   Discharged to home [facility]  Condition at discharge: Fair    Hospital Course   Gabriela Agrawal is a 89 year old female with past medical hx of dementia, HTN, HLD admitted on 2/3/2025. [facility].    Acute respiratory failure with hypoxia  Community acquired pneumonia of RLL  Presented from [facility] for possible pneumonia. Patient had a CXR at outside hospital showing possible RLL pneumonia. CT showed acute RLL pneumonia. Afebrile, not tachycardic, no leukocytosis. Admitted on 2L NC.    - Ceftriaxone 2g IV daily  - Azithromycin IV daily  - Mucinex 1200mg BID  - Abuterol  HFA inh q6h prn  - PRN O2, wean as tolerated  - SW consulted for transition back to facility    UTI with hematuria  UA with trace LE, many bacteria, 56 WBC.  - Urine culture pending  - Abx as above    Senile dementia without behavioral disturbance  Unknown baseline. Appears to be oriented to self only on admission. Previously on Donepezil, but has been discontinued recently for unknown reason.  - Delirium precautions    Essential hypertension, benign  - Continued PTA Losartan 25mg     Adrenal mass, chronic  CT read as relatively stable or slow-growing 5.8cm left adrenal mass which may represent adrenal adenoma with additional myelolipoma. There can be an increased risk of spontaneous hemorrhage with large adrenal masses.  - Consider outpatient consultation    Depression  - Continued PTA Citalopram 20mg, Ativan 0.5mg at bedtime       Consultations This Hospital Stay   CARE MANAGEMENT / SOCIAL WORK IP CONSULT    Code Status   No CPR- Do NOT Intubate    Time Spent on this Encounter   Jeffry QUEZADA DO, personally saw the patient today and spent greater than 30 minutes discharging this patient.       Jeffry Reynoso DO  [facility] MEDICAL SURGICAL  [address] [address]  Phone: [phone]  Fax: [phone]  ______________________________________________________________________    Physical Exam   Vital Signs: Temp: 98.4  F (36.9  C) Temp src: Oral BP: 131/59 Pulse: 77   Resp: 16 SpO2: 92 % O2 Device: None (Room air) Oxygen Delivery: 1.5 LPM  Weight: 0 lbs 0 oz  Physical Exam  Constitutional:       General: Pt is not in acute distress.  HENT:      Head: Normocephalic and atraumatic.      Nose: Nose normal.   Eyes:      Conjunctiva/sclera: Conjunctivae normal.   Pulmonary:      Effort: Pulmonary effort is normal.   Abdominal:      General: Abdomen is flat.   Skin:     Findings: No rash.   Neurological:      General: No focal deficit present.      Mental Status: Pt is alert.   Psychiatric:          Mood and Affect: Mood normal.          Primary Care Physician   Coco Mejia    Discharge Orders      Reason for your hospital stay    You were hospitalized for pneumonia causing breathing difficulties and discharged home after improvement.     Follow-up and recommended labs and tests     Follow up with primary care provider, Coco Mejia, within 7 days for hospital follow- up.  The following labs/tests are recommended: Recommend repeat Urine Analysis with Urine Culture in 2 weeks to test for cure (unable to collect clean sample prior to discharge but is on abx), CBC and CMP 1 month.     Activity    Your activity upon discharge: activity as tolerated     Diet    Follow this diet upon discharge: Current Diet:Orders Placed This Encounter      Combination Diet Regular Diet Adult       Significant Results and Procedures   Results for orders placed or performed during the hospital encounter of 02/03/25   CT Chest w Contrast     Value    Radiologist flags (Urgent)     Probably benign 5.8 cm left adrenal mass may benefit from outpatient specialist consultation if not previously performed.    Narrative    EXAM: CT CHEST W CONTRAST  LOCATION: [facility]  DATE: 2/3/2025    INDICATION: Hypoxia, hypotension, cough, new oxygen requirement. Advanced age, Hx of Dementia. Concern for pneumonia. Evaluate for acute cardiopulmonary process  COMPARISON: 04/19/2021  TECHNIQUE: CT chest with IV contrast. Multiplanar reformats were obtained. Dose reduction techniques were used. Motion artifact limits evaluation.    CONTRAST: 79 ml Isovue 370    FINDINGS:   LUNGS AND PLEURA: Scattered linear atelectasis in the lungs. There are few scattered solid noncalcified and calcified probable granulomas in the lungs. Nodules measure up to 1.0 cm in the left lower lobe series 4, image 199. There is mild to moderate   consolidation in the right lower lobe. This is somewhat peripheral. No pleural  effusions or pneumothorax.    MEDIASTINUM/AXILLAE: Small low-density nodules in the thyroid gland. No thoracic adenopathy. No   cardiomegaly. No pericardial effusion. Normal caliber thoracic aorta. Aortic atherosclerosis. No acute abnormality. Although not a dedicated PE study, no gross PE.    CORONARY ARTERY CALCIFICATION: Mild.    UPPER ABDOMEN: There are low-density lesions in the liver, measuring up to 4.6 cm in size. There is scattered renal cortical thinning and low-density lesions in the kidneys, measuring up to 3.2 cm in size. In the left adrenal gland, there is a 5.8 x 3.5   cm soft tissue mass with a central portion that measures 2.8 x 3.0 cm and is fat density. No calcification. This has been present since 2010, slightly smaller at that time.    MUSCULOSKELETAL: Normal.      Impression    IMPRESSION:   1.  Findings consistent with acute right lower lobe pneumonia. Please note that this is not a PE study. If there is clinical concern for PE and pulmonary infarction, then PE study is recommended.    2.  There are bilateral pulmonary nodules. Some are noncalcified and measure up to 1.0 cm in the left lower lobe. These are similar to priors, including in 2010. There are likely benign, and do not require additional follow-up imaging.    3.  Relatively stable or slow growing 5.8 cm left adrenal mass, which may represent an adrenal adenoma, with an additional myelolipoma. There can be an increased risk of spontaneous hemorrhage with large adrenal masses. Outpatient specialist consultation   is recommended if not previously performed.    4.  Probable cysts in the liver and kidneys do not require additional follow-up imaging.      [Access Center: Probably benign 5.8 cm left adrenal mass may benefit from outpatient specialist consultation if not previously performed.]    This report will be copied to the [facility] to ensure a provider acknowledges the finding. Access Center is available Monday through  Friday 8am-3:30 pm.       Discharge Medications   Current Discharge Medication List        CONTINUE these medications which have CHANGED    Details   amoxicillin-clavulanate (AUGMENTIN) 500-125 MG tablet Take 1 tablet by mouth 2 times daily.  Qty: 8 tablet, Refills: 0    Associated Diagnoses: Pneumonia of right lower lobe due to infectious organism           CONTINUE these medications which have NOT CHANGED    Details   acetaminophen (TYLENOL) 500 MG tablet Take 1,000 mg by mouth 2 times daily      acetaminophen (TYLENOL) 650 MG suppository Place 650 mg rectally every 4 hours as needed for fever or mild pain.      benzonatate (TESSALON) 100 MG capsule Take 100 mg by mouth every 8 hours as needed for cough.      bisacodyl (DULCOLAX) 10 MG suppository Place 10 mg rectally daily as needed for constipation.      citalopram (CELEXA) 20 MG tablet Take 20 mg by mouth every evening      hyoscyamine (LEVSIN/SL) 0.125 MG sublingual tablet Place 0.125 mg under the tongue every 4 hours as needed (excessive oral secretions).      LORazepam (ATIVAN) 0.5 MG tablet Take 0.5 mg by mouth at bedtime.      losartan (COZAAR) 25 MG tablet Take 25 mg by mouth daily.      oxyCODONE (ROXICODONE) 5 MG tablet Take 2.5 mg by mouth 2 times daily as needed for pain.      polyethylene glycol (MIRALAX) 17 GM/Dose powder Take 1 Capful by mouth daily.      VENTOLIN  (90 Base) MCG/ACT inhaler Inhale 2 puffs into the lungs every 4 hours as needed for shortness of breath.           STOP taking these medications       azithromycin (ZITHROMAX) 250 MG tablet Comments:   Reason for Stopping:             Allergies   Allergies   Allergen Reactions    Nkda [No Known Drug Allergy]

## 2025-02-05 NOTE — PROGRESS NOTES
Care Management Initial Consult    General Information  Assessment completed with: Children, Caregiver  Type of CM/SW Visit: Offer D/C Planning    Primary Care Provider verified and updated as needed: Yes   Readmission within the last 30 days:   No     Reason for Consult: discharge planning  Advance Care Planning: Advance Care Planning Reviewed: present on chart          Communication Assessment  Patient's communication style: spoken language (English or Bilingual)    Hearing Difficulty or Deaf: yes   Wear Glasses or Blind: no    Cognitive  Cognitive/Neuro/Behavioral: .WDL except  Level of Consciousness: confused  Arousal Level: opens eyes spontaneously  Orientation: disoriented x 4  Mood/Behavior: calm  Best Language: 0 - No aphasia  Speech: illogical    Living Environment:   People in home: facility resident     Current living Arrangements: assisted living  Name of Facility: [facility]   Able to return to prior arrangements: yes  Living Arrangement Comments: Return back to [facility]    Family/Social Support:  Care provided by:  Staff at the memory [facility] unit    Provides care for: no one, unable/limited ability to care for self     Support system: Children          Description of Support System: Supportive, Involved    Support Assessment: Adequate family and caregiver support, Adequate social supports    Current Resources:   Patient receiving home care services: No     Community Resources: None  Equipment currently used at home: hospital bed, wheelchair, manual  Supplies currently used at home: None    Employment/Financial:  Employment Status:          Financial Concerns:  No concerns      Does the patient's insurance plan have a 3 day qualifying hospital stay waiver?  Yes     Which insurance plan 3 day waiver is available? Alternative insurance waiver    Will the waiver be used for post-acute placement? No    Lifestyle & Psychosocial Needs:  Social Drivers of Health      Food Insecurity: Low Risk  (2/4/2025)    Food Insecurity     Within the past 12 months, did you worry that your food would run out before you got money to buy more?: No     Within the past 12 months, did the food you bought just not last and you didn t have money to get more?: No   Depression: Not at risk (4/1/2021)    PHQ-2     PHQ-2 Score: 1   Housing Stability: Low Risk  (2/4/2025)    Housing Stability     Do you have housing? : Yes     Are you worried about losing your housing?: No   Tobacco Use: Medium Risk (5/30/2023)    Patient History     Smoking Tobacco Use: Former     Smokeless Tobacco Use: Never     Passive Exposure: Not on file   Financial Resource Strain: Low Risk  (2/4/2025)    Financial Resource Strain     Within the past 12 months, have you or your family members you live with been unable to get utilities (heat, electricity) when it was really needed?: No   Alcohol Use: Not on file   Transportation Needs: Low Risk  (2/4/2025)    Transportation Needs     Within the past 12 months, has lack of transportation kept you from medical appointments, getting your medicines, non-medical meetings or appointments, work, or from getting things that you need?: No   Physical Activity: Not on file   Interpersonal Safety: Unknown (2/4/2025)    Interpersonal Safety     Do you feel physically and emotionally safe where you currently live?: Patient unable to answer     Within the past 12 months, have you been hit, slapped, kicked or otherwise physically hurt by someone?: Patient unable to answer     Within the past 12 months, have you been humiliated or emotionally abused in other ways by your partner or ex-partner?: Patient unable to answer   Stress: Not on file   Social Connections: Not on file   Health Literacy: Not on file     ,BS====  Functional Status:  Prior to admission patient needed assistance:   Dependent ADLs:: Bathing, Dressing, Eating, Grooming, Incontinence, Positioning, Transfers, Wheelchair-with  assist  Dependent IADLs:: Cleaning, Cooking, Laundry, Shopping, Meal Preparation, Medication Management, Money Management, Transportation, Incontinence  Assesssment of Functional Status: At functional baseline    Mental Health Status:  Mental Health Status: No Current Concerns       Chemical Dependency Status:  Chemical Dependency Status: No Current Concerns           Values/Beliefs:  Spiritual, Cultural Beliefs, Bahai Practices, Values that affect care: no               Discussed  Partnership in Safe Discharge Planning  document with patient/family: Yes:     Additional Information:  CM referral received to assist with discharge planning and services     Call placed to the RN Lakesha [phone] at the Pt's [facility] to confirm level of care.     Facility is able to accommodate her return back upon discharge.     Pt is primarily wheelchair bound. Staff can stand to pivot transfer when needed.     CM spoke with the Patient's son Kendall # [phone]  Discussed plan to return back to [facility] at time of discharge.   Son requested CM to arrange a wheelchair transport. Aware of private pay charges           Discharge Plan:   Return back to [facility]   Phone: [phone]  Fax: [phone]  [facility] fax [phone]         [facility] via wheelchair -THURSDAY  Time : 12:38--1:23pm          TORREY Glez  [facility] [phone]   [facility]  [phone]

## 2025-02-05 NOTE — PLAN OF CARE
Problem: Adult Inpatient Plan of Care  Goal: Absence of Hospital-Acquired Illness or Injury  Intervention: Prevent Skin Injury  Recent Flowsheet Documentation  Taken 2/5/2025 0000 by Coco Dsouza RN  Body Position:   left   right   turned   supine, head elevated     Problem: Adult Inpatient Plan of Care  Goal: Absence of Hospital-Acquired Illness or Injury  Intervention: Prevent Infection  Recent Flowsheet Documentation  Taken 2/5/2025 0000 by Coco Dsouza RN  Infection Prevention:   single patient room provided   hand hygiene promoted   equipment surfaces disinfected   Goal Outcome Evaluation:       Alert, but not oriented. Not able to state her first or last name. Not able to use call light. Bed alarm on. Bedrest overnight. Incontinent of urine and stool. Very large incontinent loose stool with assist of 2 and a bed change. No audible wheezes or shortness of breath noted. Maintaining O2 sats in low 90s on room air. Plan to discharge home today.

## 2025-02-05 NOTE — PLAN OF CARE
"  Problem: Adult Inpatient Plan of Care  Goal: Plan of Care Review  Description: The Plan of Care Review/Shift note should be completed every shift.  The Outcome Evaluation is a brief statement about your assessment that the patient is improving, declining, or no change.  This information will be displayed automatically on your shift  note.  2/4/2025 1923 by Hoda Hand RN  Outcome: Progressing  2/4/2025 1147 by Hoda Hand RN  Outcome: Progressing  Goal: Patient-Specific Goal (Individualized)  Description: You can add care plan individualizations to a care plan. Examples of Individualization might be:  \"Parent requests to be called daily at 9am for status\", \"I have a hard time hearing out of my right ear\", or \"Do not touch me to wake me up as it startles  me\".  2/4/2025 1923 by Hoda Hand RN  Outcome: Progressing  2/4/2025 1147 by Hoda Hand RN  Outcome: Progressing  Goal: Absence of Hospital-Acquired Illness or Injury  2/4/2025 1923 by Hoda Hand RN  Outcome: Progressing  2/4/2025 1147 by Hoda Hand RN  Outcome: Progressing  Intervention: Identify and Manage Fall Risk  Recent Flowsheet Documentation  Taken 2/4/2025 1547 by Hoda Hand RN  Safety Promotion/Fall Prevention:   activity supervised   assistive device/personal items within reach   clutter free environment maintained   lighting adjusted   nonskid shoes/slippers when out of bed   safety round/check completed  Taken 2/4/2025 0856 by Hoda Hand RN  Safety Promotion/Fall Prevention:   activity supervised   assistive device/personal items within reach   clutter free environment maintained   lighting adjusted   nonskid shoes/slippers when out of bed   safety round/check completed  Intervention: Prevent Infection  Recent Flowsheet Documentation  Taken 2/4/2025 1547 by Hoda Hand RN  Infection Prevention:   single patient room provided   hand hygiene promoted   " equipment surfaces disinfected  Taken 2/4/2025 0856 by Hoda Hand RN  Infection Prevention:   single patient room provided   hand hygiene promoted   equipment surfaces disinfected  Goal: Optimal Comfort and Wellbeing  2/4/2025 1923 by Hoda Hand RN  Outcome: Progressing  2/4/2025 1147 by Hoda Hand RN  Outcome: Progressing  Goal: Readiness for Transition of Care  2/4/2025 1923 by Hoda Hand RN  Outcome: Progressing  2/4/2025 1147 by Hoda Hand RN  Outcome: Progressing  Intervention: Mutually Develop Transition Plan  Recent Flowsheet Documentation  Taken 2/4/2025 1000 by Hoda Hand RN  Equipment Currently Used at Home: hospital bed     Problem: Fall Injury Risk  Goal: Absence of Fall and Fall-Related Injury  2/4/2025 1923 by Hoda Hand RN  Outcome: Progressing  2/4/2025 1147 by Hoda Hand RN  Outcome: Progressing  Intervention: Promote Injury-Free Environment  Recent Flowsheet Documentation  Taken 2/4/2025 1547 by Hoda Hand RN  Safety Promotion/Fall Prevention:   activity supervised   assistive device/personal items within reach   clutter free environment maintained   lighting adjusted   nonskid shoes/slippers when out of bed   safety round/check completed  Taken 2/4/2025 0856 by Hoda Hand RN  Safety Promotion/Fall Prevention:   activity supervised   assistive device/personal items within reach   clutter free environment maintained   lighting adjusted   nonskid shoes/slippers when out of bed   safety round/check completed     Problem: Gas Exchange Impaired  Goal: Optimal Gas Exchange  2/4/2025 1923 by Hoda Hand RN  Outcome: Progressing  2/4/2025 1147 by Hoda Hand RN  Outcome: Progressing   Goal Outcome Evaluation:  BP (!) 144/50 (BP Location: Right arm)   Pulse 72   Temp 97.6  F (36.4  C) (Axillary)   Resp 21   SpO2 93%          Maintained SpO2 WNL on RA. One BM today.  Incontinent. Takes off supplemental oxygen but pleasant. Requires assist to feed. Called RT to complete chest physio. Wheezes still noted. Patient appears comfortable without signs of SOB observed. Encouraged patient to cough.

## 2025-02-05 NOTE — PLAN OF CARE
Goal Outcome Evaluation:                 Outcome Evaluation: Return back to [facility]

## 2025-02-06 VITALS
HEART RATE: 77 BPM | DIASTOLIC BLOOD PRESSURE: 69 MMHG | SYSTOLIC BLOOD PRESSURE: 127 MMHG | TEMPERATURE: 97.8 F | RESPIRATION RATE: 18 BRPM | OXYGEN SATURATION: 92 %

## 2025-02-06 PROCEDURE — 250N000013 HC RX MED GY IP 250 OP 250 PS 637

## 2025-02-06 PROCEDURE — 250N000013 HC RX MED GY IP 250 OP 250 PS 637: Performed by: STUDENT IN AN ORGANIZED HEALTH CARE EDUCATION/TRAINING PROGRAM

## 2025-02-06 RX ORDER — AMOXICILLIN AND CLAVULANATE POTASSIUM 500; 125 MG/1; MG/1
1 TABLET, FILM COATED ORAL 2 TIMES DAILY
Qty: 7 TABLET | Refills: 0 | Status: SHIPPED | OUTPATIENT
Start: 2025-02-06 | End: 2025-02-06

## 2025-02-06 RX ORDER — AMOXICILLIN AND CLAVULANATE POTASSIUM 500; 125 MG/1; MG/1
1 TABLET, FILM COATED ORAL 2 TIMES DAILY
Qty: 7 TABLET | Refills: 0 | Status: SHIPPED | OUTPATIENT
Start: 2025-02-06

## 2025-02-06 RX ADMIN — AMOXICILLIN AND CLAVULANATE POTASSIUM 1 TABLET: 875; 125 TABLET, COATED ORAL at 08:41

## 2025-02-06 RX ADMIN — LOSARTAN POTASSIUM 25 MG: 25 TABLET, FILM COATED ORAL at 08:45

## 2025-02-06 RX ADMIN — AZITHROMYCIN DIHYDRATE 500 MG: 250 TABLET ORAL at 08:41

## 2025-02-06 ASSESSMENT — ACTIVITIES OF DAILY LIVING (ADL)
ADLS_ACUITY_SCORE: 88
ADLS_ACUITY_SCORE: 84
ADLS_ACUITY_SCORE: 88
ADLS_ACUITY_SCORE: 84
ADLS_ACUITY_SCORE: 84
ADLS_ACUITY_SCORE: 88
ADLS_ACUITY_SCORE: 84

## 2025-02-06 NOTE — PLAN OF CARE
ZAYRA SWEETG DISCHARGE NOTE    Patient discharged to nursing home at 12:58 PM via wheel chair. Accompanied by significant other and staff. Discharge instructions reviewed with caregiver, opportunity offered to ask questions. Prescriptions sent to patients preferred pharmacy. All belongings sent with patient.    Bob Fowler RN

## 2025-02-06 NOTE — PROGRESS NOTES
Care Management Discharge Note    Discharge Date: 02/06/2025       Discharge Disposition: Assisted Living    Discharge Services: None    Discharge DME: Wheelchair    Discharge Transportation:  [facility]    Private pay costs discussed: transportation costs    Does the patient's insurance plan have a 3 day qualifying hospital stay waiver?    Alternative insurance waiver    Patient/family educated on Medicare website which has current facility and service quality ratings: No    Education Provided on the Discharge Plan: No  Persons Notified of Discharge Plans: Yes  Patient/Family in Agreement with the Plan: Yes    Handoff Referral Completed: No, handoff not indicated or clinically appropriate    Additional Information:    Pt to discharge today to [facility]   Phone: [phone]  Fax: [phone]  [facility] fax [phone]      Transportation: [facility] via wheelchair  -  Time : 12:38--1:23pm     Priti Duval  Care Transitions   [facility]  Tele:[phone]

## 2025-02-06 NOTE — DISCHARGE SUMMARY
[facility]  Hospitalist Discharge Summary      Date of Admission:  2/3/2025  Date of Discharge:  2/5/2025  Discharging Provider: Jeffry Reynoso DO  Discharge Service: Hospitalist Service    Discharge Diagnoses   Gabriela Agrawal is a 89 year old female with past medical hx of dementia, HTN, HLD admitted on 2/3/2025. She presented from [facility] for possible pneumonia. After resolution of hypoxia, patient discharged home on oral antibiotics.    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  The following labs/tests are recommended: Recommend repeat Urine Analysis with Urine Culture in 2 weeks to test for cure (unable to collect clean sample prior to discharge but is on abx), CBC and CMP 1 month.                Unresulted Labs Ordered in the Past 30 Days of this Admission       No orders found from 1/4/2025 to 2/4/2025.        These results will be followed up by n/a    Discharge Disposition   Discharged to home [facility]  Condition at discharge: Fair    Hospital Course   Gabriela Agrawal is a 89 year old female with past medical hx of dementia, HTN, HLD admitted on 2/3/2025. She presented from [facility] for possible pneumonia.    Acute respiratory failure with hypoxia  Community acquired pneumonia of RLL  Presented from [facility] for possible pneumonia. Patient had a CXR at outside hospital showing possible RLL pneumonia. CT showed acute RLL pneumonia. Afebrile, not tachycardic, no leukocytosis. Admitted on 2L NC.    - Ceftriaxone 2g IV daily  - Azithromycin IV daily  - Mucinex 1200mg BID  - Abuterol  HFA inh q6h prn  - PRN O2, wean as tolerated  - SW consulted for transition back to facility    UTI with hematuria  UA with trace LE, many bacteria, 56 WBC.  - Urine culture pending  - Abx as above    Senile dementia without behavioral disturbance  Unknown baseline. Appears to be oriented to self only on admission. Previously on Donepezil, but has been discontinued recently for unknown reason.  - Delirium precautions    Essential hypertension, benign  - Continued PTA Losartan 25mg     Adrenal mass, chronic  CT read as relatively stable or slow-growing 5.8cm left adrenal mass which may represent adrenal adenoma with additional myelolipoma. There can be an increased risk of spontaneous hemorrhage with large adrenal masses.  - Consider outpatient consultation    Depression  - Continued PTA Citalopram 20mg, Ativan 0.5mg at bedtime       Consultations This Hospital Stay   CARE MANAGEMENT / SOCIAL WORK IP CONSULT    Code Status   No CPR- Do NOT Intubate    Time Spent on this Encounter     Did not see patient today       Jeffry Reynoso DO  [facility]  [address] [address]  Phone: [phone]  Fax: [phone]  ______________________________________________________________________    Physical Exam   Vital Signs: Temp: 97.9  F (36.6  C) Temp src: Oral BP: (!) 165/72 Pulse: 92   Resp: 18 SpO2: 92 % O2 Device: None (Room air)    Weight: 0 lbs 0 oz  N/a         Primary Care Physician   Coco Mejia    Discharge Orders      Reason for your hospital stay    You were hospitalized for pneumonia causing breathing difficulties and discharged home after improvement.     Follow-up and recommended labs and tests     Follow up with primary care provider, Coco Mejia, within 7 days for hospital follow- up.  The following labs/tests are recommended: Recommend repeat Urine Analysis with Urine Culture in 2 weeks to test for cure (unable to collect clean sample prior to  discharge but is on abx), CBC and CMP 1 month.     Activity    Your activity upon discharge: activity as tolerated     Diet    Follow this diet upon discharge: Current Diet:Orders Placed This Encounter      Combination Diet Regular Diet Adult       Significant Results and Procedures   Results for orders placed or performed during the hospital encounter of 02/03/25   CT Chest w Contrast     Value    Radiologist flags (Urgent)     Probably benign 5.8 cm left adrenal mass may benefit from outpatient specialist consultation if not previously performed.    Narrative    EXAM: CT CHEST W CONTRAST  LOCATION: [facility]  DATE: 2/3/2025    INDICATION: Hypoxia, hypotension, cough, new oxygen requirement. Advanced age, Hx of Dementia. Concern for pneumonia. Evaluate for acute cardiopulmonary process  COMPARISON: 04/19/2021  TECHNIQUE: CT chest with IV contrast. Multiplanar reformats were obtained. Dose reduction techniques were used. Motion artifact limits evaluation.    CONTRAST: 79 ml Isovue 370    FINDINGS:   LUNGS AND PLEURA: Scattered linear atelectasis in the lungs. There are few scattered solid noncalcified and calcified probable granulomas in the lungs. Nodules measure up to 1.0 cm in the left lower lobe series 4, image 199. There is mild to moderate   consolidation in the right lower lobe. This is somewhat peripheral. No pleural effusions or pneumothorax.    MEDIASTINUM/AXILLAE: Small low-density nodules in the thyroid gland. No thoracic adenopathy. No   cardiomegaly. No pericardial effusion. Normal caliber thoracic aorta. Aortic atherosclerosis. No acute abnormality. Although not a dedicated PE study, no gross PE.    CORONARY ARTERY CALCIFICATION: Mild.    UPPER ABDOMEN: There are low-density lesions in the liver, measuring up to 4.6 cm in size. There is scattered renal cortical thinning and low-density lesions in the kidneys, measuring up to 3.2 cm in size. In the left adrenal gland, there  is a 5.8 x 3.5   cm soft tissue mass with a central portion that measures 2.8 x 3.0 cm and is fat density. No calcification. This has been present since 2010, slightly smaller at that time.    MUSCULOSKELETAL: Normal.      Impression    IMPRESSION:   1.  Findings consistent with acute right lower lobe pneumonia. Please note that this is not a PE study. If there is clinical concern for PE and pulmonary infarction, then PE study is recommended.    2.  There are bilateral pulmonary nodules. Some are noncalcified and measure up to 1.0 cm in the left lower lobe. These are similar to priors, including in 2010. There are likely benign, and do not require additional follow-up imaging.    3.  Relatively stable or slow growing 5.8 cm left adrenal mass, which may represent an adrenal adenoma, with an additional myelolipoma. There can be an increased risk of spontaneous hemorrhage with large adrenal masses. Outpatient specialist consultation   is recommended if not previously performed.    4.  Probable cysts in the liver and kidneys do not require additional follow-up imaging.      [Access Center: Probably benign 5.8 cm left adrenal mass may benefit from outpatient specialist consultation if not previously performed.]    This report will be copied to the [facility] to ensure a provider acknowledges the finding. [facility] is available Monday through Friday 8am-3:30 pm.       Discharge Medications   Current Discharge Medication List        CONTINUE these medications which have CHANGED    Details   amoxicillin-clavulanate (AUGMENTIN) 500-125 MG tablet Take 1 tablet by mouth 2 times daily.  Qty: 7 tablet, Refills: 0    Comments: First dose PM Thursday 02/06, received AM dose in hospital  Associated Diagnoses: Pneumonia of right lower lobe due to infectious organism           CONTINUE these medications which have NOT CHANGED    Details   acetaminophen (TYLENOL) 500 MG tablet Take 1,000 mg by mouth 2 times daily       acetaminophen (TYLENOL) 650 MG suppository Place 650 mg rectally every 4 hours as needed for fever or mild pain.      benzonatate (TESSALON) 100 MG capsule Take 100 mg by mouth every 8 hours as needed for cough.      bisacodyl (DULCOLAX) 10 MG suppository Place 10 mg rectally daily as needed for constipation.      citalopram (CELEXA) 20 MG tablet Take 20 mg by mouth every evening      hyoscyamine (LEVSIN/SL) 0.125 MG sublingual tablet Place 0.125 mg under the tongue every 4 hours as needed (excessive oral secretions).      LORazepam (ATIVAN) 0.5 MG tablet Take 0.5 mg by mouth at bedtime.      losartan (COZAAR) 25 MG tablet Take 25 mg by mouth daily.      oxyCODONE (ROXICODONE) 5 MG tablet Take 2.5 mg by mouth 2 times daily as needed for pain.      polyethylene glycol (MIRALAX) 17 GM/Dose powder Take 1 Capful by mouth daily.      VENTOLIN  (90 Base) MCG/ACT inhaler Inhale 2 puffs into the lungs every 4 hours as needed for shortness of breath.           STOP taking these medications       azithromycin (ZITHROMAX) 250 MG tablet Comments:   Reason for Stopping:             Allergies   Allergies   Allergen Reactions    Nkda [No Known Drug Allergy]

## 2025-02-06 NOTE — PLAN OF CARE
Goal Outcome Evaluation:  Problem: Adult Inpatient Plan of Care  Goal: Optimal Comfort and Wellbeing  Outcome: Progressing

## 2025-02-06 NOTE — PLAN OF CARE
"Problem: Adult Inpatient Plan of Care  Goal: Plan of Care Review  Description: The Plan of Care Review/Shift note should be completed every shift.  The Outcome Evaluation is a brief statement about your assessment that the patient is improving, declining, or no change.  This information will be displayed automatically on your shift  note.  Outcome: Progressing  Goal: Patient-Specific Goal (Individualized)  Description: You can add care plan individualizations to a care plan. Examples of Individualization might be:  \"Parent requests to be called daily at 9am for status\", \"I have a hard time hearing out of my right ear\", or \"Do not touch me to wake me up as it startles  me\".  Outcome: Progressing  Goal: Absence of Hospital-Acquired Illness or Injury  Outcome: Progressing  Intervention: Identify and Manage Fall Risk  Recent Flowsheet Documentation  Taken 2/5/2025 1600 by Heidy Loya RN  Safety Promotion/Fall Prevention:   activity supervised   nonskid shoes/slippers when out of bed   room door open  Taken 2/5/2025 0900 by Heidy Loya RN  Safety Promotion/Fall Prevention:   activity supervised   nonskid shoes/slippers when out of bed   room door open  Goal: Optimal Comfort and Wellbeing  Outcome: Progressing  Goal: Readiness for Transition of Care  Outcome: Progressing     Problem: Fall Injury Risk  Goal: Absence of Fall and Fall-Related Injury  Outcome: Progressing  Intervention: Identify and Manage Contributors  Recent Flowsheet Documentation  Taken 2/5/2025 1600 by Heidy Loya RN  Medication Review/Management: medications reviewed  Taken 2/5/2025 0900 by Heidy Loya RN  Medication Review/Management: medications reviewed  Intervention: Promote Injury-Free Environment  Recent Flowsheet Documentation  Taken 2/5/2025 1600 by Heidy Loya RN  Safety Promotion/Fall Prevention:   activity supervised   nonskid shoes/slippers when out of bed   room door open  Taken 2/5/2025 0900 by " Heidy Loya, RN  Safety Promotion/Fall Prevention:   activity supervised   nonskid shoes/slippers when out of bed   room door open     Problem: Gas Exchange Impaired  Goal: Optimal Gas Exchange  Outcome: Progressing     Problem: Delirium  Goal: Optimal Coping  Outcome: Progressing  Goal: Improved Behavioral Control  Outcome: Progressing  Goal: Improved Attention and Thought Clarity  Outcome: Progressing  Goal: Improved Sleep  Outcome: Progressing     Goal Outcome Evaluation:  Patient has been alert but is disoriented x 4.  Takes her pills whole.  Ate well today after tray set up, fed self.  Up to commode and chair with assist of two and gait belt.  Patient has been incontinent of urine and stool, unable to get urine sample on commode.  Has been on room air, O2 sat low 90's.  Does have a congested cough.  IV antibiotic administered this evening, otherwise saline locked.

## 2025-02-19 ENCOUNTER — LAB REQUISITION (OUTPATIENT)
Dept: LAB | Facility: CLINIC | Age: OVER 89
End: 2025-02-19
Payer: COMMERCIAL

## 2025-02-19 DIAGNOSIS — N30.00 ACUTE CYSTITIS WITHOUT HEMATURIA: ICD-10-CM

## 2025-02-19 LAB
ALBUMIN UR-MCNC: 100 MG/DL
APPEARANCE UR: ABNORMAL
BACTERIA #/AREA URNS HPF: ABNORMAL /HPF
BILIRUB UR QL STRIP: NEGATIVE
COLOR UR AUTO: YELLOW
GLUCOSE UR STRIP-MCNC: NEGATIVE MG/DL
HGB UR QL STRIP: ABNORMAL
KETONES UR STRIP-MCNC: NEGATIVE MG/DL
LEUKOCYTE ESTERASE UR QL STRIP: ABNORMAL
NITRATE UR QL: POSITIVE
PH UR STRIP: 6.5 [PH] (ref 5–7)
RBC URINE: 97 /HPF
SP GR UR STRIP: 1.02 (ref 1–1.03)
SQUAMOUS EPITHELIAL: 4 /HPF
UROBILINOGEN UR STRIP-MCNC: NORMAL MG/DL
WBC CLUMPS #/AREA URNS HPF: PRESENT /HPF
WBC URINE: >182 /HPF
YEAST #/AREA URNS HPF: ABNORMAL /HPF
YEAST #/AREA URNS HPF: ABNORMAL /HPF

## 2025-02-19 PROCEDURE — 81001 URINALYSIS AUTO W/SCOPE: CPT | Mod: ORL

## 2025-02-19 PROCEDURE — 87086 URINE CULTURE/COLONY COUNT: CPT | Mod: ORL

## 2025-02-22 LAB — BACTERIA UR CULT: NORMAL

## 2025-03-06 ENCOUNTER — LAB REQUISITION (OUTPATIENT)
Dept: LAB | Facility: CLINIC | Age: OVER 89
End: 2025-03-06
Payer: COMMERCIAL

## 2025-03-06 DIAGNOSIS — N30.00 ACUTE CYSTITIS WITHOUT HEMATURIA: ICD-10-CM

## 2025-03-07 LAB
ALBUMIN SERPL BCG-MCNC: 3.6 G/DL (ref 3.5–5.2)
ALP SERPL-CCNC: 66 U/L (ref 40–150)
ALT SERPL W P-5'-P-CCNC: 17 U/L (ref 0–50)
ANION GAP SERPL CALCULATED.3IONS-SCNC: 10 MMOL/L (ref 7–15)
AST SERPL W P-5'-P-CCNC: 15 U/L (ref 0–45)
BILIRUB SERPL-MCNC: 0.2 MG/DL
BUN SERPL-MCNC: 17.7 MG/DL (ref 8–23)
CALCIUM SERPL-MCNC: 9.1 MG/DL (ref 8.8–10.4)
CHLORIDE SERPL-SCNC: 104 MMOL/L (ref 98–107)
CREAT SERPL-MCNC: 0.71 MG/DL (ref 0.51–0.95)
EGFRCR SERPLBLD CKD-EPI 2021: 80 ML/MIN/1.73M2
GLUCOSE SERPL-MCNC: 82 MG/DL (ref 70–99)
HCO3 SERPL-SCNC: 26 MMOL/L (ref 22–29)
POTASSIUM SERPL-SCNC: 4 MMOL/L (ref 3.4–5.3)
PROT SERPL-MCNC: 6 G/DL (ref 6.4–8.3)
SODIUM SERPL-SCNC: 140 MMOL/L (ref 135–145)

## 2025-03-07 PROCEDURE — 80053 COMPREHEN METABOLIC PANEL: CPT | Mod: ORL

## 2025-03-07 PROCEDURE — 36415 COLL VENOUS BLD VENIPUNCTURE: CPT | Mod: ORL

## 2025-03-07 PROCEDURE — P9603 ONE-WAY ALLOW PRORATED MILES: HCPCS | Mod: ORL

## 2025-07-17 ENCOUNTER — LAB REQUISITION (OUTPATIENT)
Dept: LAB | Facility: CLINIC | Age: 89
End: 2025-07-17
Payer: COMMERCIAL

## 2025-07-17 DIAGNOSIS — I10 ESSENTIAL (PRIMARY) HYPERTENSION: ICD-10-CM

## 2025-07-17 DIAGNOSIS — E46 UNSPECIFIED PROTEIN-CALORIE MALNUTRITION: ICD-10-CM

## 2025-07-17 DIAGNOSIS — Z71.89 OTHER SPECIFIED COUNSELING: ICD-10-CM

## 2025-07-17 DIAGNOSIS — E55.9 VITAMIN D DEFICIENCY, UNSPECIFIED: ICD-10-CM

## 2025-07-17 DIAGNOSIS — Z00.00 ENCOUNTER FOR GENERAL ADULT MEDICAL EXAMINATION WITHOUT ABNORMAL FINDINGS: ICD-10-CM

## 2025-07-17 DIAGNOSIS — M15.9 POLYOSTEOARTHRITIS, UNSPECIFIED: ICD-10-CM

## 2025-07-17 DIAGNOSIS — R54 AGE-RELATED PHYSICAL DEBILITY: ICD-10-CM

## 2025-07-17 DIAGNOSIS — F33.9 MAJOR DEPRESSIVE DISORDER, RECURRENT, UNSPECIFIED: ICD-10-CM

## 2025-07-17 DIAGNOSIS — R91.8 OTHER NONSPECIFIC ABNORMAL FINDING OF LUNG FIELD: ICD-10-CM

## 2025-07-17 DIAGNOSIS — F03.90 UNSPECIFIED DEMENTIA, UNSPECIFIED SEVERITY, WITHOUT BEHAVIORAL DISTURBANCE, PSYCHOTIC DISTURBANCE, MOOD DISTURBANCE, AND ANXIETY (H): ICD-10-CM

## 2025-07-17 DIAGNOSIS — E78.5 HYPERLIPIDEMIA, UNSPECIFIED: ICD-10-CM

## 2025-07-23 LAB
ALBUMIN SERPL BCG-MCNC: 3.6 G/DL (ref 3.5–5.2)
ALBUMIN SERPL BCG-MCNC: 3.6 G/DL (ref 3.5–5.2)
ALP SERPL-CCNC: 65 U/L (ref 40–150)
ALP SERPL-CCNC: 65 U/L (ref 40–150)
ALT SERPL W P-5'-P-CCNC: 5 U/L (ref 0–50)
ALT SERPL W P-5'-P-CCNC: 5 U/L (ref 0–50)
ANION GAP SERPL CALCULATED.3IONS-SCNC: 10 MMOL/L (ref 7–15)
ANION GAP SERPL CALCULATED.3IONS-SCNC: 10 MMOL/L (ref 7–15)
AST SERPL W P-5'-P-CCNC: 13 U/L (ref 0–45)
AST SERPL W P-5'-P-CCNC: 13 U/L (ref 0–45)
BILIRUB SERPL-MCNC: 0.3 MG/DL
BILIRUB SERPL-MCNC: 0.3 MG/DL
BUN SERPL-MCNC: 16.4 MG/DL (ref 8–23)
BUN SERPL-MCNC: 16.4 MG/DL (ref 8–23)
CALCIUM SERPL-MCNC: 9 MG/DL (ref 8.8–10.4)
CALCIUM SERPL-MCNC: 9 MG/DL (ref 8.8–10.4)
CHLORIDE SERPL-SCNC: 106 MMOL/L (ref 98–107)
CHLORIDE SERPL-SCNC: 106 MMOL/L (ref 98–107)
CREAT SERPL-MCNC: 0.63 MG/DL (ref 0.51–0.95)
CREAT SERPL-MCNC: 0.63 MG/DL (ref 0.51–0.95)
EGFRCR SERPLBLD CKD-EPI 2021: 83 ML/MIN/1.73M2
EGFRCR SERPLBLD CKD-EPI 2021: 83 ML/MIN/1.73M2
ERYTHROCYTE [DISTWIDTH] IN BLOOD BY AUTOMATED COUNT: 14.6 % (ref 10–15)
ERYTHROCYTE [DISTWIDTH] IN BLOOD BY AUTOMATED COUNT: 14.6 % (ref 10–15)
GLUCOSE SERPL-MCNC: 79 MG/DL (ref 70–99)
GLUCOSE SERPL-MCNC: 79 MG/DL (ref 70–99)
HCO3 SERPL-SCNC: 26 MMOL/L (ref 22–29)
HCO3 SERPL-SCNC: 26 MMOL/L (ref 22–29)
HCT VFR BLD AUTO: 40.1 % (ref 35–47)
HCT VFR BLD AUTO: 40.1 % (ref 35–47)
HGB BLD-MCNC: 12 G/DL (ref 11.7–15.7)
HGB BLD-MCNC: 12 G/DL (ref 11.7–15.7)
MCH RBC QN AUTO: 26.3 PG (ref 26.5–33)
MCH RBC QN AUTO: 26.3 PG (ref 26.5–33)
MCHC RBC AUTO-ENTMCNC: 29.9 G/DL (ref 31.5–36.5)
MCHC RBC AUTO-ENTMCNC: 29.9 G/DL (ref 31.5–36.5)
MCV RBC AUTO: 88 FL (ref 78–100)
MCV RBC AUTO: 88 FL (ref 78–100)
PLATELET # BLD AUTO: 283 10E3/UL (ref 150–450)
PLATELET # BLD AUTO: 283 10E3/UL (ref 150–450)
POTASSIUM SERPL-SCNC: 3.8 MMOL/L (ref 3.4–5.3)
POTASSIUM SERPL-SCNC: 3.8 MMOL/L (ref 3.4–5.3)
PROT SERPL-MCNC: 6.1 G/DL (ref 6.4–8.3)
PROT SERPL-MCNC: 6.1 G/DL (ref 6.4–8.3)
RBC # BLD AUTO: 4.57 10E6/UL (ref 3.8–5.2)
RBC # BLD AUTO: 4.57 10E6/UL (ref 3.8–5.2)
SODIUM SERPL-SCNC: 142 MMOL/L (ref 135–145)
SODIUM SERPL-SCNC: 142 MMOL/L (ref 135–145)
TSH SERPL DL<=0.005 MIU/L-ACNC: 2.24 UIU/ML (ref 0.3–4.2)
TSH SERPL DL<=0.005 MIU/L-ACNC: 2.24 UIU/ML (ref 0.3–4.2)
VIT B12 SERPL-MCNC: 270 PG/ML (ref 232–1245)
VIT B12 SERPL-MCNC: 270 PG/ML (ref 232–1245)
VIT D+METAB SERPL-MCNC: 32 NG/ML (ref 20–50)
VIT D+METAB SERPL-MCNC: 32 NG/ML (ref 20–50)
WBC # BLD AUTO: 8 10E3/UL (ref 4–11)
WBC # BLD AUTO: 8 10E3/UL (ref 4–11)

## 2025-07-23 PROCEDURE — 82607 VITAMIN B-12: CPT | Mod: ORL

## 2025-07-23 PROCEDURE — 80053 COMPREHEN METABOLIC PANEL: CPT | Mod: ORL

## 2025-07-23 PROCEDURE — 82306 VITAMIN D 25 HYDROXY: CPT | Mod: ORL

## 2025-07-23 PROCEDURE — P9604 ONE-WAY ALLOW PRORATED TRIP: HCPCS | Mod: ORL

## 2025-07-23 PROCEDURE — 85027 COMPLETE CBC AUTOMATED: CPT | Mod: ORL

## 2025-07-23 PROCEDURE — 84443 ASSAY THYROID STIM HORMONE: CPT | Mod: ORL
